# Patient Record
Sex: FEMALE | Race: ASIAN | Employment: OTHER | URBAN - METROPOLITAN AREA
[De-identification: names, ages, dates, MRNs, and addresses within clinical notes are randomized per-mention and may not be internally consistent; named-entity substitution may affect disease eponyms.]

---

## 2017-04-27 ENCOUNTER — OFFICE VISIT (OUTPATIENT)
Dept: PEDIATRICS | Facility: CLINIC | Age: 32
End: 2017-04-27
Payer: COMMERCIAL

## 2017-04-27 VITALS
OXYGEN SATURATION: 99 % | BODY MASS INDEX: 25.09 KG/M2 | DIASTOLIC BLOOD PRESSURE: 80 MMHG | WEIGHT: 141.6 LBS | TEMPERATURE: 98.6 F | HEIGHT: 63 IN | SYSTOLIC BLOOD PRESSURE: 116 MMHG | HEART RATE: 79 BPM

## 2017-04-27 DIAGNOSIS — Z13.1 SCREENING FOR DIABETES MELLITUS: ICD-10-CM

## 2017-04-27 DIAGNOSIS — Z13.6 CARDIOVASCULAR SCREENING; LDL GOAL LESS THAN 160: ICD-10-CM

## 2017-04-27 DIAGNOSIS — Z12.4 SCREENING FOR MALIGNANT NEOPLASM OF CERVIX: ICD-10-CM

## 2017-04-27 DIAGNOSIS — R22.1 LOCALIZED SWELLING, MASS AND LUMP, NECK: ICD-10-CM

## 2017-04-27 DIAGNOSIS — Z00.00 ENCOUNTER FOR ROUTINE ADULT HEALTH EXAMINATION WITHOUT ABNORMAL FINDINGS: Primary | ICD-10-CM

## 2017-04-27 PROCEDURE — 87624 HPV HI-RISK TYP POOLED RSLT: CPT | Performed by: NURSE PRACTITIONER

## 2017-04-27 PROCEDURE — G0145 SCR C/V CYTO,THINLAYER,RESCR: HCPCS | Performed by: NURSE PRACTITIONER

## 2017-04-27 PROCEDURE — 99385 PREV VISIT NEW AGE 18-39: CPT | Performed by: NURSE PRACTITIONER

## 2017-04-27 NOTE — MR AVS SNAPSHOT
After Visit Summary   4/27/2017    Lou Rivers    MRN: [id number]           Patient Information     Date Of Birth          1985        Visit Information        Provider Department      4/27/2017 4:20 PM Joselin Little APRN CNP [facility]        Today's Diagnoses     Encounter for routine adult health examination without abnormal findings    -  1    CARDIOVASCULAR SCREENING; LDL GOAL LESS THAN 160        Screening for diabetes mellitus        Screening for malignant neoplasm of cervix        Localized swelling, mass and lump, neck on left patient feels           Care Instructions    PLAN:   1.  Orders Placed This Encounter   Procedures     US Thyroid     HPV High Risk Types DNA Cervical     Pap imaged thin layer screen with HPV - recommended age 30 - 65 years (select HPV order below)     **Lipid panel reflex to direct LDL FUTURE anytime     **TSH with free T4 reflex FUTURE anytime     **Vitamin D Deficiency FUTURE anytime     **Basic metabolic panel FUTURE anytime       2. Patient needs to follow up in if no improvement,or sooner if worsening of symptoms or other symptoms develop.  FURTHER TESTING:       - Thyroid ultrasound  I will place order. Please call [phone] to schedule.  FUTURE LABS:       - Schedule a fasting blood draw   Will follow up and/or notify patient on results via phone or mail to determine further need for followup  Follow up office visit in one year for annual health maintenance exam, sooner PRN.    Preventive Health Recommendations  Female Ages 26 - 39  Yearly exam:   See your health care provider every year in order to    Review health changes.     Discuss preventive care.      Review your medicines if you your doctor has prescribed any.    Until age 30: Get a Pap test every three years (more often if you have had an abnormal result).    After age 30: Talk to your doctor about whether you should have a Pap test every 3 years or have a Pap test  with HPV screening every 5 years.   You do not need a Pap test if your uterus was removed (hysterectomy) and you have not had cancer.  You should be tested each year for STDs (sexually transmitted diseases), if you're at risk.   Talk to your provider about how often to have your cholesterol checked.  If you are at risk for diabetes, you should have a diabetes test (fasting glucose).  Shots: Get a flu shot each year. Get a tetanus shot every 10 years.   Nutrition:     Eat at least 5 servings of fruits and vegetables each day.    Eat whole-grain bread, whole-wheat pasta and brown rice instead of white grains and rice.    Talk to your provider about Calcium and Vitamin D.     Lifestyle    Exercise at least 150 minutes a week (30 minutes a day, 5 days of the week). This will help you control your weight and prevent disease.    Limit alcohol to one drink per day.    No smoking.     Wear sunscreen to prevent skin cancer.    See your dentist every six months for an exam and cleaning.    It was a pleasure seeing you today at the [facility]. Thank you for allowing us to care for you today. We truly hope we provided you with the excellent service you deserve. Please let us know if there is anything else we can do for you so we can be sure you are leaving completley satisfied with your care experience.       General information about your clinic   Clinic Hours Lab Hours (Appointments are required)   Mon-Thurs: 7:30 AM - 7 PM Mon-Thurs: 7:30 AM - 7 PM   Fri: 7:30 AM - 5 PM Fri: 7:30 AM - 5 PM        After Hours Nurse Advise & Appts:  [facility] Nurse Advisors: [phone]  Donte On Call: to make appointments anytime: [phone] On Call Physician: call [phone] and answering service will page the on call physician.        For urgent appointments, please call [phone] and ask for the triage nurse or your care team clinic nurse.  How to contact my care team:  Cesar:  [email or web address]   Phone: [phone]   Fax: [phone]       [facility]:   Phone: [phone]  Hours: 8:00 AM - 6:00 PM  Medication Refills:  Call your pharmacy and they will forward the refill to us. Please allow 3 business days for your refills to be completed.       Normal or non-critical lab and imaging results will be communicated to you by MyChart, letter or phone within 7 days.  If you do not hear from us within 10 days, please call the clinic. If you have a critical or abnormal lab result, we will notify you by phone as soon as possible.       We now have PWIC (Pediatric Walk in Care)  Monday-Friday from 7:30-4. Simply walk in and be seen for your urgent needs like cough, fever, rash, diarrhea or vomiting, pink eye, UTI. No appointments needed. Ask one of the team for more information      -Your Care Team:    Dr. Saud Elizabeth - Internal Medicine/Pediatrics   Dr. Jose Booth - Family Medicine  Dr. Rosa Aguilar - Pediatrics  Joselin Little CNP - Family Practice Nurse Practitioner  Dr. Tiffany Hobbs - Pediatrics  Dr. Irvin Yan - Internal Medicine                    Follow-ups after your visit        Future tests that were ordered for you today     Open Future Orders        Priority Expected Expires Ordered    **Lipid panel reflex to direct LDL FUTURE anytime Routine 4/27/2017 4/27/2018 4/27/2017    **TSH with free T4 reflex FUTURE anytime Routine 4/27/2017 4/27/2018 4/27/2017    **Vitamin D Deficiency FUTURE anytime Routine 4/27/2017 4/27/2018 4/27/2017    **Basic metabolic panel FUTURE anytime Routine 4/27/2017 4/27/2018 4/27/2017    US Thyroid Routine  4/27/2018 4/27/2017            Who to contact     If you have questions or need follow up information about today's clinic visit or your schedule please contact [facility] directly at [phone].  Normal or non-critical lab and imaging results will be communicated to you by MyChart, letter or phone within 4 business  "days after the clinic has received the results. If you do not hear from us within 7 days, please contact the clinic through [employer] or phone. If you have a critical or abnormal lab result, we will notify you by phone as soon as possible.  Submit refill requests through [employer] or call your pharmacy and they will forward the refill request to us. Please allow 3 business days for your refill to be completed.          Additional Information About Your Visit        [employer] Information     [employer] is an electronic gateway that provides easy, online access to your medical records. With [employer], you can request a clinic appointment, read your test results, renew a prescription or communicate with your care team.     To sign up for [employer] visit the website at [email or web address]   You will be asked to enter the access code listed below, as well as some personal information. Please follow the directions to create your username and password.     Your access code is: [email or web address]  Expires: 2017  5:12 PM     Your access code will  in 90 days. If you need help or a new code, please contact your [facility] or call [phone] for assistance.        Care EveryWhere ID     This is your Care EveryWhere ID. This could be used by other organizations to access your [place] medical records  [id number]        Your Vitals Were     Pulse Temperature Height Last Period Pulse Oximetry Breastfeeding?    79 98.6  F (37  C) (Temporal) 5' 3.25\" (1.607 m) 04/10/2017 99% No    BMI (Body Mass Index)                   24.89 kg/m2            Blood Pressure from Last 3 Encounters:   17 116/80    Weight from Last 3 Encounters:   17 141 lb 9.6 oz (64.2 kg)              We Performed the Following     HPV High Risk Types DNA Cervical     Pap imaged thin layer screen with HPV - recommended age 30 - 65 years (select HPV order below)        Primary Care Provider    [place] " [facility]       No address on file        Thank you!     Thank you for choosing [facility]  for your care. Our goal is always to provide you with excellent care. Hearing back from our patients is one way we can continue to improve our services. Please take a few minutes to complete the written survey that you may receive in the mail after your visit with us. Thank you!             Your Updated Medication List - Protect others around you: Learn how to safely use, store and throw away your medicines at www.disposemymeds.org.      Notice  As of 4/27/2017  5:12 PM    You have not been prescribed any medications.

## 2017-04-27 NOTE — NURSING NOTE
"Chief Complaint   Patient presents with     Physical     [place]       Initial /80 (BP Location: Right arm, Patient Position: Chair, Cuff Size: Adult Regular)  Pulse 79  Temp 98.6  F (37  C) (Temporal)  Ht 5' 3.25\" (1.607 m)  Wt 141 lb 9.6 oz (64.2 kg)  LMP 04/10/2017  SpO2 99%  Breastfeeding? No  BMI 24.89 kg/m2 Estimated body mass index is 24.89 kg/(m^2) as calculated from the following:    Height as of this encounter: 5' 3.25\" (1.607 m).    Weight as of this encounter: 141 lb 9.6 oz (64.2 kg).  Medication Reconciliation: complete      JEFFREY Lainez      "

## 2017-04-27 NOTE — PATIENT INSTRUCTIONS
PLAN:   1.  Orders Placed This Encounter   Procedures     US Thyroid     HPV High Risk Types DNA Cervical     Pap imaged thin layer screen with HPV - recommended age 30 - 65 years (select HPV order below)     **Lipid panel reflex to direct LDL FUTURE anytime     **TSH with free T4 reflex FUTURE anytime     **Vitamin D Deficiency FUTURE anytime     **Basic metabolic panel FUTURE anytime       2. Patient needs to follow up in if no improvement,or sooner if worsening of symptoms or other symptoms develop.  FURTHER TESTING:       - Thyroid ultrasound  I will place order. Please call [phone] to schedule.  FUTURE LABS:       - Schedule a fasting blood draw   Will follow up and/or notify patient on results via phone or mail to determine further need for followup  Follow up office visit in one year for annual health maintenance exam, sooner PRN.    Preventive Health Recommendations  Female Ages 26 - 39  Yearly exam:   See your health care provider every year in order to    Review health changes.     Discuss preventive care.      Review your medicines if you your doctor has prescribed any.    Until age 30: Get a Pap test every three years (more often if you have had an abnormal result).    After age 30: Talk to your doctor about whether you should have a Pap test every 3 years or have a Pap test with HPV screening every 5 years.   You do not need a Pap test if your uterus was removed (hysterectomy) and you have not had cancer.  You should be tested each year for STDs (sexually transmitted diseases), if you're at risk.   Talk to your provider about how often to have your cholesterol checked.  If you are at risk for diabetes, you should have a diabetes test (fasting glucose).  Shots: Get a flu shot each year. Get a tetanus shot every 10 years.   Nutrition:     Eat at least 5 servings of fruits and vegetables each day.    Eat whole-grain bread, whole-wheat pasta and brown rice instead of white grains and rice.    Talk to  your provider about Calcium and Vitamin D.     Lifestyle    Exercise at least 150 minutes a week (30 minutes a day, 5 days of the week). This will help you control your weight and prevent disease.    Limit alcohol to one drink per day.    No smoking.     Wear sunscreen to prevent skin cancer.    See your dentist every six months for an exam and cleaning.    It was a pleasure seeing you today at the [facility]. Thank you for allowing us to care for you today. We truly hope we provided you with the excellent service you deserve. Please let us know if there is anything else we can do for you so we can be sure you are leaving completley satisfied with your care experience.       General information about your clinic   Clinic Hours Lab Hours (Appointments are required)   Mon-Thurs: 7:30 AM - 7 PM Mon-Thurs: 7:30 AM - 7 PM   Fri: 7:30 AM - 5 PM Fri: 7:30 AM - 5 PM        After Hours Nurse Advise & Appts:  [facility] Nurse Advisors: [phone]  Donte On Call: to make appointments anytime: [phone] On Call Physician: call [phone] and answering service will page the on call physician.        For urgent appointments, please call [phone] and ask for the triage nurse or your care team clinic nurse.  How to contact my care team:  Cesar: [email or web address]   Phone: [phone]   Fax: [phone]       [facility]:   Phone: [phone]  Hours: 8:00 AM - 6:00 PM  Medication Refills:  Call your pharmacy and they will forward the refill to us. Please allow 3 business days for your refills to be completed.       Normal or non-critical lab and imaging results will be communicated to you by MyChart, letter or phone within 7 days.  If you do not hear from us within 10 days, please call the clinic. If you have a critical or abnormal lab result, we will notify you by phone as soon as possible.       We now have PWIC (Pediatric Walk in Care)  Monday-Friday from 7:30-4. Simply  walk in and be seen for your urgent needs like cough, fever, rash, diarrhea or vomiting, pink eye, UTI. No appointments needed. Ask one of the team for more information      -Your Care Team:    Dr. Saud Elizabeth - Internal Medicine/Pediatrics   Dr. Jose Booth - Family Medicine  Dr. Rosa Aguilar - Pediatrics  Joselin Little CNP - Family Practice Nurse Practitioner  Dr. Tiffany Hobbs - Pediatrics  Dr. Irvin Yan - Internal Medicine

## 2017-04-27 NOTE — PROGRESS NOTES
SUBJECTIVE:     CC: Lou Rivers is an 31 year old woman who presents for preventive health visit.   This patient is accompanied in the office by her  who is interpreting for her.    Healthy Habits:    Do you get at least three servings of calcium containing foods daily (dairy, green leafy vegetables, etc.)? no, taking calcium and/or vitamin D supplement: no    Amount of exercise or daily activities, outside of work: 3 day(s) per week    Problems taking medications regularly not applicable    Medication side effects: No    Have you had an eye exam in the past two years? yes    Do you see a dentist twice per year? no    Do you have sleep apnea, excessive snoring or daytime drowsiness?no      Today's PHQ-2 Score:   PHQ-2 ( 1999 Pfizer) 4/27/2017   Q1: Little interest or pleasure in doing things 1   Q2: Feeling down, depressed or hopeless 1   PHQ-2 Score 2       Abuse: Current or Past(Physical, Sexual or Emotional)- No  Do you feel safe in your environment - Yes    Social History   Substance Use Topics     Smoking status: Never Smoker     Smokeless tobacco: Never Used     Alcohol use No     The patient does not drink >3 drinks per day nor >7 drinks per week.    No results for input(s): CHOL, HDL, LDL, TRIG, CHOLHDLRATIO, NHDL in the last 17459 hours.    Reviewed orders with patient.  Reviewed health maintenance and updated orders accordingly - Yes    Mammo Decision Support:  Mammogram not appropriate for this patient based on age.    Pertinent mammograms are reviewed under the imaging tab.  History of abnormal Pap smear: NO - age 30- 65 PAP every 3 years recommended    Reviewed and updated as needed this visit by clinical staff  Tobacco  Allergies  Meds  Med Hx  Surg Hx  Fam Hx  Soc Hx        Reviewed and updated as needed this visit by Provider        Past Medical History:   Diagnosis Date     Allergic rhinitis      History of anxiety disorder 2012     Latent tuberculosis 2003      Past Surgical  History:   Procedure Laterality Date     LASIK BILATERAL Bilateral      SINUS SURGERY         ROS:  CONSTITUTIONAL:NEGATIVE for fever, chills, change in weight  INTEGUMENTARY/SKIN: NEGATIVE for worrisome rashes, moles or lesions  EYES: NEGATIVE for vision changes or irritation  ENT: NEGATIVE for ear, mouth and throat problems. Feels like something in her left side of her neck   RESP:NEGATIVE for significant cough or SOB  BREAST: NEGATIVE for masses, tenderness or discharge  CV: NEGATIVE for chest pain, palpitations or peripheral edema  GI: NEGATIVE for nausea, abdominal pain, heartburn, or change in bowel habits   female: normal menses, no unusual urinary symptoms and no unusual vaginal symptoms  MUSCULOSKELETAL:NEGATIVE for significant arthralgias or myalgia  NEURO: NEGATIVE for weakness, dizziness or paresthesias  ENDOCRINE: NEGATIVE for temperature intolerance, skin/hair changes  HEME/ALLERGY/IMMUNE: NEGATIVE for bleeding problems  PSYCHIATRIC: POSITIVE forHx anxiety and NEGATIVE foranxiety and depressed mood       Patient Active Problem List   Diagnosis     Allergic rhinitis     Past Surgical History:   Procedure Laterality Date     LASIK BILATERAL Bilateral      SINUS SURGERY         Social History   Substance Use Topics     Smoking status: Never Smoker     Smokeless tobacco: Never Used     Alcohol use No     Family History   Problem Relation Age of Onset     Coronary Artery Disease Mother      Hypertension Mother      Other Cancer Mother      throat cancer     Other - See Comments Mother      neck problems     Thyroid Disease Mother      Coronary Artery Disease Father      Hypertension Father      Hyperlipidemia Father      Other - See Comments Father      herniated disc in lumbar     Gout Father      Liver Cancer Maternal Grandmother      Myocardial Infarction Maternal Grandfather      DIABETES No family hx of      CEREBROVASCULAR DISEASE No family hx of      Breast Cancer No family hx of      Colon Cancer  "No family hx of      Prostate Cancer No family hx of      Depression No family hx of      Anxiety Disorder No family hx of      MENTAL ILLNESS No family hx of      Substance Abuse No family hx of      Anesthesia Reaction No family hx of      Asthma No family hx of      OSTEOPOROSIS No family hx of      Genetic Disorder No family hx of      Obesity No family hx of      Unknown/Adopted No family hx of          No current outpatient prescriptions on file.     No Known Allergies  OBJECTIVE:     /80 (BP Location: Right arm, Patient Position: Chair, Cuff Size: Adult Regular)  Pulse 79  Temp 98.6  F (37  C) (Temporal)  Ht 5' 3.25\" (1.607 m)  Wt 141 lb 9.6 oz (64.2 kg)  LMP 04/10/2017  SpO2 99%  Breastfeeding? No  BMI 24.89 kg/m2  EXAM:  GENERAL: healthy, alert and no distress  EYES: Eyes grossly normal to inspection, PERRL and conjunctivae and sclerae normal  HENT: ear canals and TM's normal, nose and mouth without ulcers or lesions  NECK: no adenopathy, no asymmetry, masses, or scars and thyroid normal to palpation  RESP: lungs clear to auscultation - no rales, rhonchi or wheezes  BREAST: normal without masses, tenderness or nipple discharge and no palpable axillary masses or adenopathy  CV: regular rate and rhythm, normal S1 S2, no S3 or S4, no murmur, click or rub, no peripheral edema and peripheral pulses strong  ABDOMEN: soft, nontender, no hepatosplenomegaly, no masses and bowel sounds normal   (female): normal female external genitalia, normal urethral meatus, vaginal mucosa pink, moist, well rugated, and normal cervix/adnexa/uterus without masses or discharge  MS: no gross musculoskeletal defects noted, no edema  SKIN: no suspicious lesions or rashes  NEURO: Normal strength and tone, mentation intact and speech normal  PSYCH: mentation appears normal, affect normal/bright  LYMPH: no cervical, supraclavicular, axillary, or inguinal adenopathy    .US THYROID 5/2/2017 5:13 PM     CLINICAL HISTORY: " Localized swelling, mass and lump, neck     COMPARISON: None     FINDINGS: The right thyroid measures 4.1 x 1.4 x 2.0 cm. The left  thyroid measures 4.4 x 1.2 x 1.6 cm. Thyroid isthmus measures 0.2 cm.  No nodules greater than 5 mm are present. There are approximately 3. 5  mm nodules in the left lobe. Specifically, there is no abnormality in  the area where the patient felt a lump.         IMPRESSION: No abnormality area where patient felt a lump. 3 small  nodules in the left thyroid.     DARRIUS COLLIER MD  ASSESSMENT/PLAN:     Lou was seen today for physical.    Diagnoses and all orders for this visit:    Encounter for routine adult health examination without abnormal findings  -     **Vitamin D Deficiency FUTURE anytime; Future  -     Cancel: Vitamin D Deficiency; Future    CARDIOVASCULAR SCREENING; LDL GOAL LESS THAN 160  -     **Lipid panel reflex to direct LDL FUTURE anytime; Future    Screening for diabetes mellitus  -     **Basic metabolic panel FUTURE anytime; Future    Screening for malignant neoplasm of cervix  -     HPV High Risk Types DNA Cervical  -     Pap imaged thin layer screen with HPV - recommended age 30 - 65 years (select HPV order below)    Localized swelling, mass and lump, neck on left patient feels   -     US Thyroid; Future  -     **TSH with free T4 reflex FUTURE anytime; Future    PLAN:    Patient needs to follow up in if no improvement,or sooner if worsening of symptoms or other symptoms develop.  FURTHER TESTING:       - Thyroid ultrasound  I will place order. Please call [phone] to schedule.  FUTURE LABS:       - Schedule a fasting blood draw   Will follow up and/or notify patient on results via phone or mail to determine further need for followup  Follow up office visit in one year for annual health maintenance exam, sooner PRN.      COUNSELING:   Reviewed preventive health counseling, as reflected in patient instructions  Special attention given to:        Regular  "exercise       Healthy diet/nutrition       Contraception       Family planning         reports that she has never smoked. She has never used smokeless tobacco.    Estimated body mass index is 24.89 kg/(m^2) as calculated from the following:    Height as of this encounter: 5' 3.25\" (1.607 m).    Weight as of this encounter: 141 lb 9.6 oz (64.2 kg).       Counseling Resources:  ATP IV Guidelines  Pooled Cohorts Equation Calculator  Breast Cancer Risk Calculator  FRAX Risk Assessment  ICSI Preventive Guidelines  Dietary Guidelines for Americans, 2010  USDA's MyPlate  ASA Prophylaxis  Lung CA Screening    Joselin Little, LAINEY CNP  [facility]  "

## 2017-04-27 NOTE — LETTER
May 8, 2017    Lou Rivers  [address] [place] [address]    Dear Lou,  We are happy to inform you that your PAP smear result from 4/27/17 is normal.  We are now able to do a follow up test on PAP smears. The DNA test is for HPV (Human Papilloma Virus). Cervical cancer is closely linked with certain types of HPV. Your result showed no evidence of high risk HPV.  Therefore we recommend you return in 3 years for your next pap smear.  You will still need to return to the clinic every year for an annual exam and other preventive tests.  Please contact the clinic at [phone] with any questions.  Sincerely,    LAINEY Jang CNP/rlm

## 2017-05-01 LAB
COPATH REPORT: NORMAL
PAP: NORMAL

## 2017-05-02 ENCOUNTER — RADIANT APPOINTMENT (OUTPATIENT)
Dept: ULTRASOUND IMAGING | Facility: CLINIC | Age: 32
End: 2017-05-02
Attending: NURSE PRACTITIONER
Payer: COMMERCIAL

## 2017-05-02 DIAGNOSIS — R22.1 LOCALIZED SWELLING, MASS AND LUMP, NECK: ICD-10-CM

## 2017-05-02 LAB
FINAL DIAGNOSIS: NORMAL
HPV HR 12 DNA CVX QL NAA+PROBE: NEGATIVE
HPV16 DNA SPEC QL NAA+PROBE: NEGATIVE
HPV18 DNA SPEC QL NAA+PROBE: NEGATIVE
SPECIMEN DESCRIPTION: NORMAL

## 2017-05-02 PROCEDURE — 76536 US EXAM OF HEAD AND NECK: CPT | Performed by: RADIOLOGY

## 2017-05-03 ENCOUNTER — TELEPHONE (OUTPATIENT)
Dept: PEDIATRICS | Facility: CLINIC | Age: 32
End: 2017-05-03

## 2017-05-03 NOTE — TELEPHONE ENCOUNTER
Left message via [employer]  for patient to return call to clinic and ask to speak with RN.    Amber Gavin RN,   [facility]

## 2017-05-03 NOTE — TELEPHONE ENCOUNTER
US Thyroid   Status:  Final result   Visible to patient:  No (Not Released) Dx:  Localized swelling, mass and lump, ne... Order: [id number]       Notes Recorded by Joselin Little APRN CNP on 5/3/2017 at 12:40 PM  Please let patient know that ultrasound did not  anything in the area she felt a lump

## 2017-05-05 DIAGNOSIS — Z00.00 ENCOUNTER FOR ROUTINE ADULT HEALTH EXAMINATION WITHOUT ABNORMAL FINDINGS: ICD-10-CM

## 2017-05-05 DIAGNOSIS — Z13.6 CARDIOVASCULAR SCREENING; LDL GOAL LESS THAN 160: ICD-10-CM

## 2017-05-05 DIAGNOSIS — R22.1 LOCALIZED SWELLING, MASS AND LUMP, NECK: ICD-10-CM

## 2017-05-05 DIAGNOSIS — Z13.1 SCREENING FOR DIABETES MELLITUS: ICD-10-CM

## 2017-05-05 LAB
ANION GAP SERPL CALCULATED.3IONS-SCNC: 7 MMOL/L (ref 3–14)
BUN SERPL-MCNC: 9 MG/DL (ref 7–30)
CALCIUM SERPL-MCNC: 8.6 MG/DL (ref 8.5–10.1)
CHLORIDE SERPL-SCNC: 107 MMOL/L (ref 94–109)
CHOLEST SERPL-MCNC: 189 MG/DL
CO2 SERPL-SCNC: 25 MMOL/L (ref 20–32)
CREAT SERPL-MCNC: 0.54 MG/DL (ref 0.52–1.04)
DEPRECATED CALCIDIOL+CALCIFEROL SERPL-MC: 20 UG/L (ref 20–75)
GFR SERPL CREATININE-BSD FRML MDRD: NORMAL ML/MIN/1.7M2
GLUCOSE SERPL-MCNC: 98 MG/DL (ref 70–99)
HDLC SERPL-MCNC: 50 MG/DL
LDLC SERPL CALC-MCNC: 107 MG/DL
NONHDLC SERPL-MCNC: 139 MG/DL
POTASSIUM SERPL-SCNC: 4 MMOL/L (ref 3.4–5.3)
SODIUM SERPL-SCNC: 139 MMOL/L (ref 133–144)
TRIGL SERPL-MCNC: 160 MG/DL
TSH SERPL DL<=0.005 MIU/L-ACNC: 2.09 MU/L (ref 0.4–4)

## 2017-05-05 PROCEDURE — 36415 COLL VENOUS BLD VENIPUNCTURE: CPT | Performed by: NURSE PRACTITIONER

## 2017-05-05 PROCEDURE — 80061 LIPID PANEL: CPT | Performed by: NURSE PRACTITIONER

## 2017-05-05 PROCEDURE — 84443 ASSAY THYROID STIM HORMONE: CPT | Performed by: NURSE PRACTITIONER

## 2017-05-05 PROCEDURE — 82306 VITAMIN D 25 HYDROXY: CPT | Performed by: NURSE PRACTITIONER

## 2017-05-05 PROCEDURE — 80048 BASIC METABOLIC PNL TOTAL CA: CPT | Performed by: NURSE PRACTITIONER

## 2017-05-05 NOTE — TELEPHONE ENCOUNTER
Left message informing patient the US did not show anything in the area she felt a lump.  If you have further questions to please call clinic and ask to speak with nurse.    Amber Gavin RN,   [facility], [facility]

## 2017-07-24 ENCOUNTER — OFFICE VISIT (OUTPATIENT)
Dept: PEDIATRICS | Facility: CLINIC | Age: 32
End: 2017-07-24
Payer: COMMERCIAL

## 2017-07-24 VITALS
OXYGEN SATURATION: 96 % | HEIGHT: 63 IN | DIASTOLIC BLOOD PRESSURE: 76 MMHG | HEART RATE: 83 BPM | TEMPERATURE: 99.6 F | SYSTOLIC BLOOD PRESSURE: 112 MMHG | BODY MASS INDEX: 24.93 KG/M2 | WEIGHT: 140.7 LBS

## 2017-07-24 DIAGNOSIS — R19.7 DIARRHEA, UNSPECIFIED TYPE: ICD-10-CM

## 2017-07-24 DIAGNOSIS — R09.A2 GLOBUS SENSATION: Primary | ICD-10-CM

## 2017-07-24 PROCEDURE — 99214 OFFICE O/P EST MOD 30 MIN: CPT | Performed by: NURSE PRACTITIONER

## 2017-07-24 NOTE — PATIENT INSTRUCTIONS
PLAN:   1.   Symptomatic therapy suggested: start prilosec once a day.    2.  Orders Placed This Encounter   Medications     omeprazole (PRILOSEC) 20 MG CR capsule     Sig: Take 1 capsule (20 mg) by mouth daily     Dispense:  30 capsule     Refill:  1     Orders Placed This Encounter   Procedures     OTOLARYNGOLOGY REFERRAL       3. Patient needs to follow up in if no improvement,or sooner if worsening of symptoms or other symptoms develop.  FUTURE LABS:       - stool cultures   Will follow up and/or notify patient on results via phone or mail to determine further need for followup    It was a pleasure seeing you today at the [facility]. Thank you for allowing us to care for you today. We truly hope we provided you with the excellent service you deserve. Please let us know if there is anything else we can do for you so we can be sure you are leaving completley satisfied with your care experience.       General information about your clinic   Clinic Hours Lab Hours (Appointments are required)   Mon-Thurs: 7:30 AM - 7 PM Mon-Thurs: 7:30 AM - 7 PM   Fri: 7:30 AM - 5 PM Fri: 7:30 AM - 5 PM        After Hours Nurse Advise & Appts:  [facility] Nurse Advisors: [phone]  [facility] On Call: to make appointments anytime: [phone] On Call Physician: call [phone] and answering service will page the on call physician.        For urgent appointments, please call [phone] and ask for the triage nurse or your care team clinic nurse.  How to contact my care team:  [facility] [employer]: [email or web address]   Phone: [phone]   Fax: [phone]       [facility]:   Phone: [phone]  Hours: 8:00 AM - 6:00 PM  Medication Refills:  Call your pharmacy and they will forward the refill to us. Please allow 3 business days for your refills to be completed.       Normal or non-critical lab and imaging results will be communicated to you by MyChart, letter or phone within 7 days.  If you do not  hear from us within 10 days, please call the clinic. If you have a critical or abnormal lab result, we will notify you by phone as soon as possible.       We now have PWIC (Pediatric Walk in Care)  Monday-Friday from 7:30-4. Simply walk in and be seen for your urgent needs like cough, fever, rash, diarrhea or vomiting, pink eye, UTI. No appointments needed. Ask one of the team for more information      -Your Care Team:    Dr. Irvin Yan - Internal Medicine  Dr. Saud Elizabeth - Internal Medicine/Pediatrics   Dr. Jose Booth - Family Medicine  Dr. Rosa Aguilar - Pediatrics  Dr. Tiffany Hobbs - Pediatrics  Joselin Little CNP - Family Practice Nurse Practitioner

## 2017-07-24 NOTE — MR AVS SNAPSHOT
After Visit Summary   7/24/2017    Lou Rivers    MRN: [id number]           Patient Information     Date Of Birth          1985        Visit Information        Provider Department      7/24/2017 3:00 PM Joselin Little APRN CNP [facility]        Today's Diagnoses     Globus sensation    -  1    Diarrhea, unspecified type          Care Instructions    PLAN:   1.   Symptomatic therapy suggested: start prilosec once a day.    2.  Orders Placed This Encounter   Medications     omeprazole (PRILOSEC) 20 MG CR capsule     Sig: Take 1 capsule (20 mg) by mouth daily     Dispense:  30 capsule     Refill:  1     Orders Placed This Encounter   Procedures     OTOLARYNGOLOGY REFERRAL       3. Patient needs to follow up in if no improvement,or sooner if worsening of symptoms or other symptoms develop.  FUTURE LABS:       - stool cultures   Will follow up and/or notify patient on results via phone or mail to determine further need for followup    It was a pleasure seeing you today at the [facility]. Thank you for allowing us to care for you today. We truly hope we provided you with the excellent service you deserve. Please let us know if there is anything else we can do for you so we can be sure you are leaving completley satisfied with your care experience.       General information about your clinic   Clinic Hours Lab Hours (Appointments are required)   Mon-Thurs: 7:30 AM - 7 PM Mon-Thurs: 7:30 AM - 7 PM   Fri: 7:30 AM - 5 PM Fri: 7:30 AM - 5 PM        After Hours Nurse Advise & Appts:  [facility] Nurse Advisors: [phone]  [facility] On Call: to make appointments anytime: [phone] On Call Physician: call [phone] and answering service will page the on call physician.        For urgent appointments, please call [phone] and ask for the triage nurse or your care team clinic nurse.  How to contact my care team:  MyChart: [email or web address]    Phone: [phone]   Fax: [phone]       [facility]:   Phone: [phone]  Hours: 8:00 AM - 6:00 PM  Medication Refills:  Call your pharmacy and they will forward the refill to us. Please allow 3 business days for your refills to be completed.       Normal or non-critical lab and imaging results will be communicated to you by MyChart, letter or phone within 7 days.  If you do not hear from us within 10 days, please call the clinic. If you have a critical or abnormal lab result, we will notify you by phone as soon as possible.       We now have PWIC (Pediatric Walk in Care)  Monday-Friday from 7:30-4. Simply walk in and be seen for your urgent needs like cough, fever, rash, diarrhea or vomiting, pink eye, UTI. No appointments needed. Ask one of the team for more information      -Your Care Team:    Dr. Irvin Yan - Internal Medicine  Dr. Saud Elizabeth - Internal Medicine/Pediatrics   Dr. Jose Booth - Family Medicine  Dr. Rosa Aguilar - Pediatrics  Dr. Tiffany Hobbs - Pediatrics  Joselin Little CNP - Family Practice Nurse Practitioner                           Follow-ups after your visit        Additional Services     OTOLARYNGOLOGY REFERRAL       Your provider has referred you to: [facility]: [facility] [place] [phone]   [email or web address]    Please be aware that coverage of these services is subject to the terms and limitations of your health insurance plan.  Call member services at your health plan with any benefit or coverage questions.      Please bring the following with you to your appointment:    (1) Any X-Rays, CTs or MRIs which have been performed.  Contact the facility where they were done to arrange for  prior to your scheduled appointment.   (2) List of current medications  (3) This referral request   (4) Any documents/labs given to you for this referral                  Future tests that were ordered for  you today     Open Future Orders        Priority Expected Expires Ordered    Enteric Bacteria and Virus Panel by ANUPAM Stool Routine  2018    Ova and Parasite Exam Routine Routine  2018            Who to contact     If you have questions or need follow up information about today's clinic visit or your schedule please contact [facility] directly at [phone].  Normal or non-critical lab and imaging results will be communicated to you by [employer], letter or phone within 4 business days after the clinic has received the results. If you do not hear from us within 7 days, please contact the clinic through [employer] or phone. If you have a critical or abnormal lab result, we will notify you by phone as soon as possible.  Submit refill requests through [employer] or call your pharmacy and they will forward the refill request to us. Please allow 3 business days for your refill to be completed.          Additional Information About Your Visit        [employer] Information     [employer] is an electronic gateway that provides easy, online access to your medical records. With [employer], you can request a clinic appointment, read your test results, renew a prescription or communicate with your care team.     To sign up for [employer] visit the website at [email or web address]   You will be asked to enter the access code listed below, as well as some personal information. Please follow the directions to create your username and password.     Your access code is: [email or web address]  Expires: 2017  5:12 PM     Your access code will  in 90 days. If you need help or a new code, please contact your [facility] or call [phone] for assistance.        Care EveryWhere ID     This is your Care EveryWhere ID. This could be used by other organizations to access your [facility] medical records  [id number]        Your Vitals Were     Pulse Temperature Height Last  "Period Pulse Oximetry BMI (Body Mass Index)    83 99.6  F (37.6  C) (Temporal) 5' 3\" (1.6 m) 07/04/2017 96% 24.92 kg/m2       Blood Pressure from Last 3 Encounters:   07/24/17 112/76   04/27/17 116/80    Weight from Last 3 Encounters:   07/24/17 140 lb 11.2 oz (63.8 kg)   04/27/17 141 lb 9.6 oz (64.2 kg)              We Performed the Following     OTOLARYNGOLOGY REFERRAL          Today's Medication Changes          These changes are accurate as of: 7/24/17  3:36 PM.  If you have any questions, ask your nurse or doctor.               Start taking these medicines.        Dose/Directions    omeprazole 20 MG CR capsule   Commonly known as:  priLOSEC   Used for:  Globus sensation   Started by:  Joselin Little APRN CNP        Dose:  20 mg   Take 1 capsule (20 mg) by mouth daily   Quantity:  30 capsule   Refills:  1            Where to get your medicines      These medications were sent to [facility] [address], [address]  [address], [address], [facility] [address]     Phone:  [phone]     omeprazole 20 MG CR capsule                Primary Care Provider    [facility]       No address on file        Equal Access to Services     ALFONSO SHERMAN AH: Hadii aad ku hadasho Soomaali, waaxda luqadaha, qaybta kaalmada adeegyada, nicole farrar hayrojas weaver. So [facility] [phone].    ATENCIÓN: Si habla español, tiene a dahl disposición servicios gratuitos de asistencia lingüística. Llame al [phone].    We comply with applicable federal civil rights laws and [place] laws. We do not discriminate on the basis of race, color, national origin, age, disability sex, sexual orientation or gender identity.            Thank you!     Thank you for choosing [facility]  for your care. Our goal is always to provide you with excellent care. Hearing back from our patients is one way we can continue to improve our services. " Please take a few minutes to complete the written survey that you may receive in the mail after your visit with us. Thank you!             Your Updated Medication List - Protect others around you: Learn how to safely use, store and throw away your medicines at www.disposemymeds.org.          This list is accurate as of: 7/24/17  3:36 PM.  Always use your most recent med list.                   Brand Name Dispense Instructions for use Diagnosis    omeprazole 20 MG CR capsule    priLOSEC    30 capsule    Take 1 capsule (20 mg) by mouth daily    Globus sensation

## 2017-07-24 NOTE — NURSING NOTE
"Chief Complaint   Patient presents with     RECHECK     Lump in throat     Constipation     Blood in stool       Initial /76  Pulse 83  Temp 99.6  F (37.6  C) (Temporal)  Ht 5' 3\" (1.6 m)  Wt 140 lb 11.2 oz (63.8 kg)  LMP 07/04/2017  SpO2 96%  BMI 24.92 kg/m2 Estimated body mass index is 24.92 kg/(m^2) as calculated from the following:    Height as of this encounter: 5' 3\" (1.6 m).    Weight as of this encounter: 140 lb 11.2 oz (63.8 kg).  Medication Reconciliation: complete     Gissell Choudhary CMA  "

## 2017-07-24 NOTE — PROGRESS NOTES
SUBJECTIVE:                                                    Lou Rivers is a 32 year old female who presents to clinic today for the following health issues:    Follow up on lump in throat from 04/27/17 visit.  States it feels  very scratchy and dry at night and in the morning.  Notices it more at night when she is trying to sleep. Feels better after she drinks water.  Feels it mostly at night and early in the morning   No complaint so heartburn     Diarrhea  Duration of complaint: 2 months off/on.  Patient has had blood and mucous in her stools.    Description:       Consistency of stool: loose, mucousy and red sometimes        Blood in stool: YES       Number of loose stools past 24 hours: 1       Fever: no       Nausea/vomitting: no       Abdominal pain: no       Weight loss: no       Recent antibiotics: no       Recent travel: Went to [place] for about a month and came back about a week ago       Any contacts ill: no  Therapies tried and outcome: nothing       Problem list and histories reviewed & adjusted, as indicated.  Additional history: as documented    Patient Active Problem List   Diagnosis     Allergic rhinitis     Past Surgical History:   Procedure Laterality Date     LASIK BILATERAL Bilateral      SINUS SURGERY         Social History   Substance Use Topics     Smoking status: Never Smoker     Smokeless tobacco: Never Used     Alcohol use No     Family History   Problem Relation Age of Onset     Coronary Artery Disease Mother      Hypertension Mother      Other Cancer Mother      throat cancer     Other - See Comments Mother      neck problems     Thyroid Disease Mother      Coronary Artery Disease Father      Hypertension Father      Hyperlipidemia Father      Other - See Comments Father      herniated disc in lumbar     Gout Father      Liver Cancer Maternal Grandmother      Myocardial Infarction Maternal Grandfather      DIABETES No family hx of      CEREBROVASCULAR DISEASE No family hx of       "Breast Cancer No family hx of      Colon Cancer No family hx of      Prostate Cancer No family hx of      Depression No family hx of      Anxiety Disorder No family hx of      MENTAL ILLNESS No family hx of      Substance Abuse No family hx of      Anesthesia Reaction No family hx of      Asthma No family hx of      OSTEOPOROSIS No family hx of      Genetic Disorder No family hx of      Obesity No family hx of      Unknown/Adopted No family hx of          No current outpatient prescriptions on file.     No Known Allergies      Reviewed and updated as needed this visit by clinical staff     Reviewed and updated as needed this visit by Provider         ROS:  CONSTITUTIONAL:NEGATIVE for fever, chills, change in weight  ENT/MOUTH: NEGATIVE for ear, mouth and throat problems  RESP:NEGATIVE for significant cough or SOB  CV: NEGATIVE for chest pain/chest pressure  GI: POSITIVE for diarrhea and hematochezia and NEGATIVE for abdominal pain , nausea, poor appetite, vomiting and weight loss  MUSCULOSKELETAL: NEGATIVE for significant arthralgias or myalgia  NEURO: NEGATIVE for weakness, dizziness or paresthesias  ENDOCRINE: NEGATIVE for temperature intolerance, skin/hair changes    OBJECTIVE:     /76  Pulse 83  Temp 99.6  F (37.6  C) (Temporal)  Ht 5' 3\" (1.6 m)  Wt 140 lb 11.2 oz (63.8 kg)  LMP 07/04/2017  SpO2 96%  BMI 24.92 kg/m2  Body mass index is 24.92 kg/(m^2).  GENERAL APPEARANCE: alert, active and no distress  HENT: ear canals and TM's normal, nose and mouth without ulcers or lesions, oral mucous membranes moist and oropharynx clear  NECK: no adenopathy, no asymmetry, masses, or scars and thyroid normal to palpation  RESP: lungs clear to auscultation - no rales, rhonchi or wheezes  CV: regular rates and rhythm and no murmur, click or rub  MS: extremities normal- no gross deformities noted  NEURO: Normal strength and tone, mentation intact and speech normal  PSYCH: mentation appears normal and affect " normal/bright    Diagnostic Test Results:  Pending orders       ASSESSMENT/PLAN:       Lou was seen today for recheck and constipation.    Diagnoses and all orders for this visit:    Globus sensation  -     OTOLARYNGOLOGY REFERRAL  -     omeprazole (PRILOSEC) 20 MG CR capsule; Take 1 capsule (20 mg) by mouth daily    Diarrhea, unspecified type  -     Enteric Bacteria and Virus Panel by ANUPAM Stool; Future  -     Ova and Parasite Exam Routine; Future    PLAN:   1.   Symptomatic therapy suggested: start prilosec once a day.    2. Patient needs to follow up in if no improvement,or sooner if worsening of symptoms or other symptoms develop.  FUTURE LABS:       - stool cultures   Will follow up and/or notify patient on results via phone or mail to determine further need for followup    See Patient Instructions    LAINEY Jang CNP  [facility]

## 2017-08-02 ENCOUNTER — OFFICE VISIT (OUTPATIENT)
Dept: OTOLARYNGOLOGY | Facility: CLINIC | Age: 32
End: 2017-08-02
Attending: NURSE PRACTITIONER
Payer: COMMERCIAL

## 2017-08-02 DIAGNOSIS — R19.7 DIARRHEA, UNSPECIFIED TYPE: ICD-10-CM

## 2017-08-02 DIAGNOSIS — J30.1 CHRONIC ALLERGIC RHINITIS DUE TO POLLEN, UNSPECIFIED SEASONALITY: Primary | ICD-10-CM

## 2017-08-02 LAB
CAMPYLOBACTER GROUP BY NAT: NOT DETECTED
ENTERIC PATHOGEN COMMENT: NORMAL
NOROVIRUS I AND II BY NAT: NOT DETECTED
ROTAVIRUS A BY NAT: NOT DETECTED
SALMONELLA SPECIES BY NAT: NOT DETECTED
SHIGA TOXIN 1 GENE BY NAT: NOT DETECTED
SHIGA TOXIN 2 GENE BY NAT: NOT DETECTED
SHIGELLA SP+EIEC IPAH STL QL NAA+PROBE: NOT DETECTED
VIBRIO GROUP BY NAT: NOT DETECTED
YERSINIA ENTEROCOLITICA BY NAT: NOT DETECTED

## 2017-08-02 PROCEDURE — 99203 OFFICE O/P NEW LOW 30 MIN: CPT | Mod: 25 | Performed by: OTOLARYNGOLOGY

## 2017-08-02 PROCEDURE — 87209 SMEAR COMPLEX STAIN: CPT | Performed by: NURSE PRACTITIONER

## 2017-08-02 PROCEDURE — 87177 OVA AND PARASITES SMEARS: CPT | Performed by: NURSE PRACTITIONER

## 2017-08-02 PROCEDURE — 87506 IADNA-DNA/RNA PROBE TQ 6-11: CPT | Performed by: NURSE PRACTITIONER

## 2017-08-02 PROCEDURE — 31575 DIAGNOSTIC LARYNGOSCOPY: CPT | Performed by: OTOLARYNGOLOGY

## 2017-08-02 RX ORDER — FLUTICASONE PROPIONATE 50 MCG
1 SPRAY, SUSPENSION (ML) NASAL
Qty: 1 BOTTLE | Refills: 11 | Status: SHIPPED | OUTPATIENT
Start: 2017-08-02 | End: 2018-03-13

## 2017-08-02 ASSESSMENT — PAIN SCALES - GENERAL: PAINLEVEL: NO PAIN (0)

## 2017-08-02 NOTE — PROGRESS NOTES
HISTORY OF PRESENT ILLNESS: Lou Rivers is a 32 year old female with a history of Globus sensation for 6-8 months.  This started when she was living in [place]. While living in the she had been treated for allergies year. She moved to the [facility] in November 2016. She had bad allergies in December and in increased sore throat problem during that time. This improved later in the year but now has been persistent for the last several months. Her symptoms are intermittent. There may worse when her rhinitis is worse. Her symptoms are primarily left-sided both in terms of rhinitis and sore throat. She feels as  if there a strip of irritation along the left side of her throat. This is improved by drinking water or eating food. It is exacerbated by talking. She was started on anti-reflux medicine and this has not helped her so far.     PAST MEDICAL HISTORY:   Past Medical History:   Diagnosis Date     Allergic rhinitis      History of anxiety disorder 2012     Latent tuberculosis 2003       PAST SURGICAL HISTORY:   Past Surgical History:   Procedure Laterality Date     LASIK BILATERAL Bilateral      SINUS SURGERY         FAMILY HISTORY:   Family History   Problem Relation Age of Onset     Coronary Artery Disease Mother      Hypertension Mother      Other Cancer Mother      throat cancer     Other - See Comments Mother      neck problems     Thyroid Disease Mother      Coronary Artery Disease Father      Hypertension Father      Hyperlipidemia Father      Other - See Comments Father      herniated disc in lumbar     Gout Father      Liver Cancer Maternal Grandmother      Myocardial Infarction Maternal Grandfather      DIABETES No family hx of      CEREBROVASCULAR DISEASE No family hx of      Breast Cancer No family hx of      Colon Cancer No family hx of      Prostate Cancer No family hx of      Depression No family hx of      Anxiety Disorder No family hx of      MENTAL ILLNESS No family hx of      Substance Abuse  No family hx of      Anesthesia Reaction No family hx of      Asthma No family hx of      OSTEOPOROSIS No family hx of      Genetic Disorder No family hx of      Obesity No family hx of      Unknown/Adopted No family hx of        SOCIAL HISTORY:   Social History   Substance Use Topics     Smoking status: Never Smoker     Smokeless tobacco: Never Used     Alcohol use No       REVIEW OF SYSTEMS: Ten point review of systems was performed and is negative except for what is noted in the HPI and PMH.     ALLERGIES: Review of patient's allergies indicates no known allergies.    MEDICATIONS:   Current Outpatient Prescriptions   Medication Sig Dispense Refill     fluticasone (FLONASE) 50 MCG/ACT spray Spray 1 spray into both nostrils 2 times daily 1 Bottle 11     omeprazole (PRILOSEC) 20 MG CR capsule Take 1 capsule (20 mg) by mouth daily 30 capsule 1       PHYSICAL EXAMINATION:  She  is awake, alert and in no apparent distress.    Her tympanic membranes are clear and intact bilaterally. External auditory canals are clear.  Nasal exam shows a left septal deviation without obstruction.  Examination of the oral cavity shows no suspicious lesions.  There is symmetric movement of the tongue and soft palate.    The oropharynx shows irritation on the left side of the throat. Palpation of this area with a Q-tip identifies this as here in the area where she has discomfort but feels that it is slightly lower throat..  Her neck is supple without significant adenopathy.  Pulse is regular.  Upper airway is clear.  Cranial nerves II-XII are grossly intact.       PROCEDURE: A flexible laryngoscopy  was performed.  Informed consent was obtained and a time out was performed. 3% lidocaine and 0.25% phenylephrine was sprayed into the nasal cavity and allowed 3 to 5 minutes for effect. The scope was passed through the both nostrils. There is a left septal deviation which is partially obstructing. There is moderate mucosal edema which  decongests well. No intranasal polyps or masses are seen. The right nasal cavity is more open. There is also nasal congestion and the ostiomeatal complex is clear no intranasal masses are seen. Laryngeal exam with the fiberoptic scope shows the vocal folds to be mobile and meet in the midline there are no nodules polyps or ulcerations seen. There is mild inflammation or erythema of the supraglottic or glottic larynx.  With phonation there is mildcontraction of the supraglottic larynx.  The hypopharynx is otherwise clear as is the subglottis.     IMPRESSION:Chronic rhinitis with a history of allergic rhinitis. This is associated with postnasal drip and pharyngeal irritation which is predominantly on the left side. This is likely related to a left septal deviation that is partially obstructing.    PLAN:I will start her with a saline nasal spray and a steroid nasal spray. She should use this over the next 6-8 weeks. If this does not resolve her symptoms consultation with an allergist may be appropriate given her history of allergies in [place]. I will have her follow up on a p.r.n. basis.

## 2017-08-02 NOTE — MR AVS SNAPSHOT
After Visit Summary   2017    Lou Rivers    MRN: [id number]           Patient Information     Date Of Birth          1985        Visit Information        Provider Department      2017 11:00 AM Haresh Lal MD [facility]        Today's Diagnoses     Chronic allergic rhinitis due to pollen, unspecified seasonality    -  1      Care Instructions    1) use saline nasal spray each nostril 2-4 times a day and before the Flonase nasal spary  2) consider an air filter for bedroom          Follow-ups after your visit        Who to contact     If you have questions or need follow up information about today's clinic visit or your schedule please contact [facility] directly at [phone].  Normal or non-critical lab and imaging results will be communicated to you by MyChart, letter or phone within 4 business days after the clinic has received the results. If you do not hear from us within 7 days, please contact the clinic through [employer] or phone. If you have a critical or abnormal lab result, we will notify you by phone as soon as possible.  Submit refill requests through [employer] or call your pharmacy and they will forward the refill request to us. Please allow 3 business days for your refill to be completed.          Additional Information About Your Visit        MyChart Information     [employer]-One is an electronic gateway that provides easy, online access to your medical records. With [employer], you can request a clinic appointment, read your test results, renew a prescription or communicate with your care team.     To sign up for [employer] visit the website at [email or web address]   You will be asked to enter the access code listed below, as well as some personal information. Please follow the directions to create your username and password.     Your access code is: [email or web address]  Expires: 10/31/2017 11:38 AM     Your access code will  in  90 days. If you need help or a new code, please contact your [facility] or call [phone] for assistance.        Care EveryWhere ID     This is your Care EveryWhere ID. This could be used by other organizations to access your [facility] medical records  [id number]        Your Vitals Were     Last Period                   07/04/2017            Blood Pressure from Last 3 Encounters:   07/24/17 112/76   04/27/17 116/80    Weight from Last 3 Encounters:   07/24/17 63.8 kg (140 lb 11.2 oz)   04/27/17 64.2 kg (141 lb 9.6 oz)              Today, you had the following     No orders found for display         Today's Medication Changes          These changes are accurate as of: 8/2/17 11:38 AM.  If you have any questions, ask your nurse or doctor.               Start taking these medicines.        Dose/Directions    fluticasone 50 MCG/ACT spray   Commonly known as:  FLONASE   Used for:  Chronic allergic rhinitis due to pollen, unspecified seasonality   Started by:  Haresh Lal MD        Dose:  1 spray   Spray 1 spray into both nostrils 2 times daily   Quantity:  1 Bottle   Refills:  11            Where to get your medicines      These medications were sent to [facility] - [place], [place] - [address], [address]  [address], [address], [facility] [address]     Phone:  [phone]     fluticasone 50 MCG/ACT spray                Primary Care Provider    [facility]       No address on file        Equal Access to Services     ALFONSO SHERMAN AH: Hadii aad ku hadasho Sochelseaali, waaxda luqadaha, qaybta kaalmada evita, nicole weaver. So [facility] [phone].    ATENCIÓN: Si habla español, tiene a dahl disposición servicios gratuitos de asistencia lingüística. Llame al [phone].    We comply with applicable federal civil rights laws and [place] laws. We do not discriminate on the basis of race,  color, national origin, age, disability sex, sexual orientation or gender identity.            Thank you!     Thank you for choosing [facility]  for your care. Our goal is always to provide you with excellent care. Hearing back from our patients is one way we can continue to improve our services. Please take a few minutes to complete the written survey that you may receive in the mail after your visit with us. Thank you!             Your Updated Medication List - Protect others around you: Learn how to safely use, store and throw away your medicines at www.disposemymeds.org.          This list is accurate as of: 8/2/17 11:38 AM.  Always use your most recent med list.                   Brand Name Dispense Instructions for use Diagnosis    fluticasone 50 MCG/ACT spray    FLONASE    1 Bottle    Spray 1 spray into both nostrils 2 times daily    Chronic allergic rhinitis due to pollen, unspecified seasonality       omeprazole 20 MG CR capsule    priLOSEC    30 capsule    Take 1 capsule (20 mg) by mouth daily    Globus sensation

## 2017-08-02 NOTE — PATIENT INSTRUCTIONS
1) use saline nasal spray each nostril 2-4 times a day and before the Flonase nasal spary  2) consider an air filter for bedroom

## 2017-08-02 NOTE — NURSING NOTE
"Lou Rivers's goals for this visit include:   Chief Complaint   Patient presents with     Throat Problem     left side throat discomfort       She requests these members of her care team be copied on today's visit information: [facility]      PCP: [facility]    Referring Provider:  LAINEY Jang CNP  [facility]  [address]  [place] [address]    Chief Complaint   Patient presents with     Throat Problem     left side throat discomfort       Initial LMP 07/04/2017 Estimated body mass index is 24.92 kg/(m^2) as calculated from the following:    Height as of 7/24/17: 1.6 m (5' 3\").    Weight as of 7/24/17: 63.8 kg (140 lb 11.2 oz).  Medication Reconciliation: complete    Do you need any medication refills at today's visit? No    Latisha Lowe RN    "

## 2017-08-03 ENCOUNTER — TELEPHONE (OUTPATIENT)
Dept: PEDIATRICS | Facility: CLINIC | Age: 32
End: 2017-08-03

## 2017-08-03 LAB
MICRO REPORT STATUS: NORMAL
O+P STL MICRO: NORMAL
SPECIMEN SOURCE: NORMAL

## 2017-08-03 NOTE — TELEPHONE ENCOUNTER
Gave patient results below.  She is not having loose stools any longer she is taking the medication that Radha gave her at the office visit.  She states she will call if her diarrhea returns and get colonoscopy order.    Amber Gavin RN,   [facility]

## 2017-08-03 NOTE — TELEPHONE ENCOUNTER
Notes Recorded by Joselin Little APRN CNP on 8/2/2017 at 10:24 PM  Stool culture is negative   Ova and parasites still pending      Notes Recorded by Joselin Little APRN CNP on 8/3/2017 at 3:22 PM  Stool negative for ova and parasites   If still having loose stools next step is colonoscopy   Please let me know and will make referral if needed

## 2017-08-05 ENCOUNTER — NURSE TRIAGE (OUTPATIENT)
Dept: NURSING | Facility: CLINIC | Age: 32
End: 2017-08-05

## 2017-08-05 NOTE — TELEPHONE ENCOUNTER
Regarding: diarrhea   ----- Message from Chetna Catherine sent at 8/5/2017  2:29 PM CDT -----  Reason for call:  Symptom   Symptom or request: diarrhea     Duration (how long have symptoms been present): couple of days   Have you been treated for this before? Yes, has labs done but were negative     Additional comments: still having diarrhea 3x today    Phone number to reach patient:  Other phone number:  [phone]    Best Time:  any    Can we leave a detailed message on this number?  YES

## 2017-08-05 NOTE — TELEPHONE ENCOUNTER
Reason for Disposition    Abdominal pain  (Exception: Pain clears with each passage of diarrhea stool)    Additional Information    Negative: Shock suspected (e.g., cold/pale/clammy skin, too weak to stand, low BP, rapid pulse)    Negative: Difficult to awaken or acting confused  (e.g., disoriented, slurred speech)    Negative: Sounds like a life-threatening emergency to the triager    Negative: Vomiting also present and worse than the diarrhea    Negative: [1] Blood in stool AND [2] without diarrhea    Negative: [1] SEVERE abdominal pain (e.g., excruciating) AND [2] present > 1 hour    Negative: [1] SEVERE abdominal pain AND [2] age > 60    Negative: [1] Blood in the stool AND [2] moderate or large amount of blood    Negative: Black or tarry bowel movements  (Exception: chronic-unchanged  black-grey bowel movements AND is taking iron pills or Pepto-bismol)    Negative: [1] Drinking very little AND [2] dehydration suspected (e.g., no urine > 12 hours, very dry mouth, very lightheaded)    Negative: Patient sounds very sick or weak to the triager    Negative: [1] SEVERE diarrhea (e.g., 7 or more times / day more than normal) AND [2]  age > 60 years    Negative: [1] Constant abdominal pain AND [2] present > 2 hours    Negative: [1] Fever > 103 F (39.4 C) AND [2] not able to get the fever down using Fever Care Advice    Negative: [1] SEVERE diarrhea (e.g., 7 or more times / day more than normal) AND [2] present > 24 hours (1 day)    Negative: [1] MODERATE diarrhea (e.g., 4-6 times / day more than normal) AND [2] present > 48 hours (2 days)    Negative: [1] MODERATE diarrhea (e.g., 4-6 times / day more than normal) AND [2] age > 70 years    Negative: Fever > 101 F (38.3 C)    Negative: Fever present > 3 days (72 hours)    Protocols used: DIARRHEA-ADULT-AH      I called and spoke with her , as she requested, to relay the information. We discussed options for urgent care or the ER tonight or tomorrow, by this time  of day. He understands and asked what they should tell the MD. Advised that they need to tell them about the abdominal pains which are new and different. They will need to evaluate her based on that new symptom.  He states he understands.  Zhanna Caal RN-[facility]

## 2017-09-01 ENCOUNTER — OFFICE VISIT (OUTPATIENT)
Dept: PEDIATRICS | Facility: CLINIC | Age: 32
End: 2017-09-01
Payer: COMMERCIAL

## 2017-09-01 VITALS
TEMPERATURE: 97.9 F | HEART RATE: 77 BPM | OXYGEN SATURATION: 96 % | SYSTOLIC BLOOD PRESSURE: 110 MMHG | BODY MASS INDEX: 24.84 KG/M2 | DIASTOLIC BLOOD PRESSURE: 70 MMHG | WEIGHT: 140.2 LBS

## 2017-09-01 DIAGNOSIS — R19.7 DIARRHEA, UNSPECIFIED TYPE: Primary | ICD-10-CM

## 2017-09-01 DIAGNOSIS — Z30.09 FAMILY PLANNING COUNSELING: ICD-10-CM

## 2017-09-01 DIAGNOSIS — F43.23 ADJUSTMENT DISORDER WITH MIXED ANXIETY AND DEPRESSED MOOD: ICD-10-CM

## 2017-09-01 LAB
ALBUMIN SERPL-MCNC: 3.7 G/DL (ref 3.4–5)
ALP SERPL-CCNC: 56 U/L (ref 40–150)
ALT SERPL W P-5'-P-CCNC: 28 U/L (ref 0–50)
ANION GAP SERPL CALCULATED.3IONS-SCNC: 8 MMOL/L (ref 3–14)
AST SERPL W P-5'-P-CCNC: 20 U/L (ref 0–45)
BASOPHILS # BLD AUTO: 0.1 10E9/L (ref 0–0.2)
BASOPHILS NFR BLD AUTO: 0.8 %
BILIRUB SERPL-MCNC: 0.4 MG/DL (ref 0.2–1.3)
BUN SERPL-MCNC: 7 MG/DL (ref 7–30)
CALCIUM SERPL-MCNC: 8.8 MG/DL (ref 8.5–10.1)
CHLORIDE SERPL-SCNC: 105 MMOL/L (ref 94–109)
CO2 SERPL-SCNC: 26 MMOL/L (ref 20–32)
CREAT SERPL-MCNC: 0.5 MG/DL (ref 0.52–1.04)
DIFFERENTIAL METHOD BLD: NORMAL
EOSINOPHIL # BLD AUTO: 0.2 10E9/L (ref 0–0.7)
EOSINOPHIL NFR BLD AUTO: 3 %
ERYTHROCYTE [DISTWIDTH] IN BLOOD BY AUTOMATED COUNT: 13 % (ref 10–15)
GFR SERPL CREATININE-BSD FRML MDRD: >90 ML/MIN/1.7M2
GLUCOSE SERPL-MCNC: 87 MG/DL (ref 70–99)
HCT VFR BLD AUTO: 42.2 % (ref 35–47)
HGB BLD-MCNC: 14.4 G/DL (ref 11.7–15.7)
LYMPHOCYTES # BLD AUTO: 2.9 10E9/L (ref 0.8–5.3)
LYMPHOCYTES NFR BLD AUTO: 36.2 %
MCH RBC QN AUTO: 30.7 PG (ref 26.5–33)
MCHC RBC AUTO-ENTMCNC: 34.1 G/DL (ref 31.5–36.5)
MCV RBC AUTO: 90 FL (ref 78–100)
MONOCYTES # BLD AUTO: 0.5 10E9/L (ref 0–1.3)
MONOCYTES NFR BLD AUTO: 6.6 %
NEUTROPHILS # BLD AUTO: 4.3 10E9/L (ref 1.6–8.3)
NEUTROPHILS NFR BLD AUTO: 53.4 %
PLATELET # BLD AUTO: 240 10E9/L (ref 150–450)
POTASSIUM SERPL-SCNC: 4.1 MMOL/L (ref 3.4–5.3)
PROT SERPL-MCNC: 7.8 G/DL (ref 6.8–8.8)
RBC # BLD AUTO: 4.69 10E12/L (ref 3.8–5.2)
SODIUM SERPL-SCNC: 139 MMOL/L (ref 133–144)
TSH SERPL DL<=0.005 MIU/L-ACNC: 1.46 MU/L (ref 0.4–4)
WBC # BLD AUTO: 8 10E9/L (ref 4–11)

## 2017-09-01 PROCEDURE — 80050 GENERAL HEALTH PANEL: CPT | Performed by: NURSE PRACTITIONER

## 2017-09-01 PROCEDURE — 36415 COLL VENOUS BLD VENIPUNCTURE: CPT | Performed by: NURSE PRACTITIONER

## 2017-09-01 PROCEDURE — 99214 OFFICE O/P EST MOD 30 MIN: CPT | Performed by: NURSE PRACTITIONER

## 2017-09-01 RX ORDER — PRENATAL VIT/IRON FUM/FOLIC AC 27MG-0.8MG
1 TABLET ORAL DAILY
Qty: 100 TABLET | Refills: 3 | Status: SHIPPED | OUTPATIENT
Start: 2017-09-01 | End: 2018-06-29

## 2017-09-01 ASSESSMENT — PATIENT HEALTH QUESTIONNAIRE - PHQ9
5. POOR APPETITE OR OVEREATING: NOT AT ALL
SUM OF ALL RESPONSES TO PHQ QUESTIONS 1-9: 17

## 2017-09-01 ASSESSMENT — ANXIETY QUESTIONNAIRES
6. BECOMING EASILY ANNOYED OR IRRITABLE: NOT AT ALL
IF YOU CHECKED OFF ANY PROBLEMS ON THIS QUESTIONNAIRE, HOW DIFFICULT HAVE THESE PROBLEMS MADE IT FOR YOU TO DO YOUR WORK, TAKE CARE OF THINGS AT HOME, OR GET ALONG WITH OTHER PEOPLE: SOMEWHAT DIFFICULT
5. BEING SO RESTLESS THAT IT IS HARD TO SIT STILL: NOT AT ALL
1. FEELING NERVOUS, ANXIOUS, OR ON EDGE: NEARLY EVERY DAY
2. NOT BEING ABLE TO STOP OR CONTROL WORRYING: NEARLY EVERY DAY
7. FEELING AFRAID AS IF SOMETHING AWFUL MIGHT HAPPEN: MORE THAN HALF THE DAYS
3. WORRYING TOO MUCH ABOUT DIFFERENT THINGS: NEARLY EVERY DAY
GAD7 TOTAL SCORE: 11

## 2017-09-01 NOTE — PATIENT INSTRUCTIONS
PLAN:   1.  Orders Placed This Encounter   Medications     Prenatal Vit-Fe Fumarate-FA (PRENATAL MULTIVITAMIN PLUS IRON) 27-0.8 MG TABS per tablet     Sig: Take 1 tablet by mouth daily     Dispense:  100 tablet     Refill:  3     Orders Placed This Encounter   Procedures     Comprehensive metabolic panel     CBC with platelets differential     TSH with free T4 reflex     GASTROENTEROLOGY ADULT REF PROCEDURE ONLY       2. Patient needs to follow up in if no improvement,or sooner if worsening of symptoms or other symptoms develop.  CONSULTATION/REFERRAL to Gastroenterology  Please call [phone] to make appointment  if you do not hear from referrals in the next few days.   Consider meeting with Latisha for counseling     It was a pleasure seeing you today at the [facility]. Thank you for allowing us to care for you today. We truly hope we provided you with the excellent service you deserve. Please let us know if there is anything else we can do for you so we can be sure you are leaving completley satisfied with your care experience.       General information about your clinic   Clinic Hours Lab Hours (Appointments are required)   Mon-Thurs: 7:30 AM - 7 PM Mon-Thurs: 7:30 AM - 7 PM   Fri: 7:30 AM - 5 PM Fri: 7:30 AM - 5 PM        After Hours Nurse Advise & Appts:  [facility]: [phone]  [facility] On Call: to make appointments anytime: [phone] On Call Physician: call [phone] and answering service will page the on call physician.        For urgent appointments, please call [phone] and ask for the triage nurse or your care team clinic nurse.  How to contact my care team:  MyChart: [email or web address]   Phone: [phone]   Fax: [phone]       [facility]:   Phone: [phone]  Hours: 8:00 AM - 6:00 PM  Medication Refills:  Call your pharmacy and they will forward the refill to us. Please allow 3 business days for your refills to be completed.        Normal or non-critical lab and imaging results will be communicated to you by MyChart, letter or phone within 7 days.  If you do not hear from us within 10 days, please call the clinic. If you have a critical or abnormal lab result, we will notify you by phone as soon as possible.       We now have PWIC (Pediatric Walk in Care)  Monday-Friday from 7:30-4. Simply walk in and be seen for your urgent needs like cough, fever, rash, diarrhea or vomiting, pink eye, UTI. No appointments needed. Ask one of the team for more information      -Your Care Team:    Dr. Saud Elizabeth - Internal Medicine/Pediatrics   Dr. Jose Booth - Family Medicine  Dr. Rosa Aguilar - Pediatrics  Dr. Tiffany Hobbs - Pediatrics  Joselin Little CNP - Family Practice Nurse Practitioner

## 2017-09-01 NOTE — PROGRESS NOTES
SUBJECTIVE:   Lou Rivers is a 32 year old female who presents to clinic today for the following health issues:  Lou Rivers is accompanied today by  who helps translate for her      Diarrhea  Onset: on and off for the last 6 months, note getting better per patient.    Description:   Consistency of stool: watery, runny, yellow and green  Blood in stool: YES- last month  Number of loose stools in past 24 hours: 4    Progression of Symptoms:  waxing and waning    Accompanying Signs & Symptoms:  Fever: no   Nausea or vomiting; YES  Abdominal pain: YES- sometimes in the middle of the stomach, gurgling noises  Episodes of constipation: no   Weight loss: no     History:   Ill contacts: no   Recent use of antibiotics: no    Recent travels: no          Recent medication-new or changes(Rx or OTC): no     Precipitating factors:   none    Alleviating factors:   none    Therapies Tried and outcome:  none;   Was seen a few months ago and cultures all negative at that time did recommend colonoscopy.   She is back today with  for follow up and is continuing to have diarrhea  Yesterday was 4 times and today none yet  Has waken her up in the middle of the night    Also, she has additional complaints of :  PROBLEMS TO ADD ON...  Depression or Anxiety - New Diagnosis   Duration of complaint: having issue with anxiety and depression and states was treated 7 years ago while in [place] and now [place] been in [place] since December of last year and then has been in some culture shock.    Description of symptoms: has anxiety and depression and is thinking about getting pregnant so is hesitant go on any medication     Intensity:  moderate  Accompanying signs and symptoms: diarrhea, loose stools and mucus in stools  Aggravating factors: none  Alleviating factors: nothing  Other Therapies tried: None      Problem list and histories reviewed & adjusted, as indicated.  Additional history: as documented    Patient Active Problem  List   Diagnosis     Allergic rhinitis     Past Surgical History:   Procedure Laterality Date     LASIK BILATERAL Bilateral      SINUS SURGERY         Social History   Substance Use Topics     Smoking status: Never Smoker     Smokeless tobacco: Never Used     Alcohol use No     Family History   Problem Relation Age of Onset     Coronary Artery Disease Mother      Hypertension Mother      Other Cancer Mother      throat cancer     Other - See Comments Mother      neck problems     Thyroid Disease Mother      Coronary Artery Disease Father      Hypertension Father      Hyperlipidemia Father      Other - See Comments Father      herniated disc in lumbar     Gout Father      Liver Cancer Maternal Grandmother      Myocardial Infarction Maternal Grandfather      DIABETES No family hx of      CEREBROVASCULAR DISEASE No family hx of      Breast Cancer No family hx of      Colon Cancer No family hx of      Prostate Cancer No family hx of      Depression No family hx of      Anxiety Disorder No family hx of      MENTAL ILLNESS No family hx of      Substance Abuse No family hx of      Anesthesia Reaction No family hx of      Asthma No family hx of      OSTEOPOROSIS No family hx of      Genetic Disorder No family hx of      Obesity No family hx of      Unknown/Adopted No family hx of          Current Outpatient Prescriptions   Medication Sig Dispense Refill     fluticasone (FLONASE) 50 MCG/ACT spray Spray 1 spray into both nostrils 2 times daily 1 Bottle 11     omeprazole (PRILOSEC) 20 MG CR capsule Take 1 capsule (20 mg) by mouth daily 30 capsule 1     No Known Allergies      Reviewed and updated as needed this visit by clinical staffTobacco  Allergies  Meds  Med Hx  Surg Hx  Fam Hx  Soc Hx      Reviewed and updated as needed this visit by Provider         ROS:  Constitutional, HEENT, cardiovascular, pulmonary, gi and gu systems are negative, except as otherwise noted.      OBJECTIVE:   /70 (BP Location: Right arm,  Patient Position: Sitting, Cuff Size: Adult Regular)  Pulse 77  Temp 97.9  F (36.6  C) (Temporal)  Wt 140 lb 3.2 oz (63.6 kg)  LMP 08/21/2017  SpO2 96%  Breastfeeding? No  BMI 24.84 kg/m2  Body mass index is 24.84 kg/(m^2).   Wt Readings from Last 4 Encounters:   09/01/17 140 lb 3.2 oz (63.6 kg)   07/24/17 140 lb 11.2 oz (63.8 kg)   04/27/17 141 lb 9.6 oz (64.2 kg)       GENERAL APPEARANCE: alert, active and no distress  RESP: lungs clear to auscultation - no rales, rhonchi or wheezes  CV: regular rates and rhythm and no murmur, click or rub  ABDOMEN: soft, nontender, without hepatosplenomegaly or masses and bowel sounds normal  MS: extremities normal- no gross deformities noted  SKIN: no suspicious lesions or rashes  PSYCH: mentation appears normal, affect normal/bright and disoriented    Diagnostic Test Results:  Results for orders placed or performed in visit on 09/01/17   Comprehensive metabolic panel   Result Value Ref Range    Sodium 139 133 - 144 mmol/L    Potassium 4.1 3.4 - 5.3 mmol/L    Chloride 105 94 - 109 mmol/L    Carbon Dioxide 26 20 - 32 mmol/L    Anion Gap 8 3 - 14 mmol/L    Glucose 87 70 - 99 mg/dL    Urea Nitrogen 7 7 - 30 mg/dL    Creatinine 0.50 (L) 0.52 - 1.04 mg/dL    GFR Estimate >90 >60 mL/min/1.7m2    GFR Estimate If Black >90 >60 mL/min/1.7m2    Calcium 8.8 8.5 - 10.1 mg/dL    Bilirubin Total 0.4 0.2 - 1.3 mg/dL    Albumin 3.7 3.4 - 5.0 g/dL    Protein Total 7.8 6.8 - 8.8 g/dL    Alkaline Phosphatase 56 40 - 150 U/L    ALT 28 0 - 50 U/L    AST 20 0 - 45 U/L   CBC with platelets differential   Result Value Ref Range    WBC 8.0 4.0 - 11.0 10e9/L    RBC Count 4.69 3.8 - 5.2 10e12/L    Hemoglobin 14.4 11.7 - 15.7 g/dL    Hematocrit 42.2 35.0 - 47.0 %    MCV 90 78 - 100 fl    MCH 30.7 26.5 - 33.0 pg    MCHC 34.1 31.5 - 36.5 g/dL    RDW 13.0 10.0 - 15.0 %    Platelet Count 240 150 - 450 10e9/L    Diff Method Automated Method     % Neutrophils 53.4 %    % Lymphocytes 36.2 %    % Monocytes  6.6 %    % Eosinophils 3.0 %    % Basophils 0.8 %    Absolute Neutrophil 4.3 1.6 - 8.3 10e9/L    Absolute Lymphocytes 2.9 0.8 - 5.3 10e9/L    Absolute Monocytes 0.5 0.0 - 1.3 10e9/L    Absolute Eosinophils 0.2 0.0 - 0.7 10e9/L    Absolute Basophils 0.1 0.0 - 0.2 10e9/L   TSH with free T4 reflex   Result Value Ref Range    TSH 1.46 0.40 - 4.00 mU/L       ASSESSMENT/PLAN:       Lou was seen today for diarrhea.    Diagnoses and all orders for this visit:    Diarrhea, unspecified type  -     GASTROENTEROLOGY ADULT REF PROCEDURE ONLY  -     Comprehensive metabolic panel  -     CBC with platelets differential  -     TSH with free T4 reflex    Family planning counseling  -     Prenatal Vit-Fe Fumarate-FA (PRENATAL MULTIVITAMIN PLUS IRON) 27-0.8 MG TABS per tablet; Take 1 tablet by mouth daily    Adjustment disorder with mixed anxiety and depressed mood  Long discussion regarding the nature of depression including the internal vs external triggers.  Discussed risks, benefits, side effects, and alternatives of therapy.  Reviewed concept of depression as function of biochemical imbalance of neurotransmitters/rationale for treatment.  Risks and benefits of medication(s) reviewed with patient.  Questions answered.  Counseling advised  Refer to psychologist  Patient advised immediate presentation to hospital for suicidal thought, etc.      PLAN:    Patient needs to follow up in if no improvement,or sooner if worsening of symptoms or other symptoms develop.  CONSULTATION/REFERRAL to Gastroenterology  Please call [phone] to make appointment  if you do not hear from referrals in the next few days.   Consider meeting with Latisha for counseling    See Patient Instructions    LAINEY Jang [facility]

## 2017-09-01 NOTE — MR AVS SNAPSHOT
After Visit Summary   9/1/2017    Lou Rivers    MRN: [id number]           Patient Information     Date Of Birth          1985        Visit Information        Provider Department      9/1/2017 9:20 AM Joselin Little APRN [facility]        Today's Diagnoses     Diarrhea, unspecified type    -  1    Family planning counseling          Care Instructions    PLAN:   1.  Orders Placed This Encounter   Medications     Prenatal Vit-Fe Fumarate-FA (PRENATAL MULTIVITAMIN PLUS IRON) 27-0.8 MG TABS per tablet     Sig: Take 1 tablet by mouth daily     Dispense:  100 tablet     Refill:  3     Orders Placed This Encounter   Procedures     Comprehensive metabolic panel     CBC with platelets differential     TSH with free T4 reflex     GASTROENTEROLOGY ADULT REF PROCEDURE ONLY       2. Patient needs to follow up in if no improvement,or sooner if worsening of symptoms or other symptoms develop.  CONSULTATION/REFERRAL to Gastroenterology  Please call [phone] to make appointment  if you do not hear from referrals in the next few days.   Consider meeting with Latisha for counseling     It was a pleasure seeing you today at the [facility]. Thank you for allowing us to care for you today. We truly hope we provided you with the excellent service you deserve. Please let us know if there is anything else we can do for you so we can be sure you are leaving completley satisfied with your care experience.       General information about your clinic   Clinic Hours Lab Hours (Appointments are required)   Mon-Thurs: 7:30 AM - 7 PM Mon-Thurs: 7:30 AM - 7 PM   Fri: 7:30 AM - 5 PM Fri: 7:30 AM - 5 PM        After Hours Nurse Advise & Appts:  [facility] Nurse Advisors: [phone]  [facility] On Call: to make appointments anytime: [phone] On Call Physician: call [phone] and answering service will page the on call physician.        For urgent appointments,  please call [phone] and ask for the triage nurse or your care team clinic nurse.  How to contact my care team:  Cesar: [email or web address]   Phone: [phone]   Fax: [phone]       [facility]:   Phone: [phone]  Hours: 8:00 AM - 6:00 PM  Medication Refills:  Call your pharmacy and they will forward the refill to us. Please allow 3 business days for your refills to be completed.       Normal or non-critical lab and imaging results will be communicated to you by MyChart, letter or phone within 7 days.  If you do not hear from us within 10 days, please call the clinic. If you have a critical or abnormal lab result, we will notify you by phone as soon as possible.       We now have PWIC (Pediatric Walk in Care)  Monday-Friday from 7:30-4. Simply walk in and be seen for your urgent needs like cough, fever, rash, diarrhea or vomiting, pink eye, UTI. No appointments needed. Ask one of the team for more information      -Your Care Team:    Dr. Saud Elizabeth - Internal Medicine/Pediatrics   Dr. Jose Booth - Family Medicine  Dr. Rosa Aguilar - Pediatrics  Dr. Tiffany Hobbs - Pediatrics  Joselin Little CNP - Family Practice Nurse Practitioner                           Follow-ups after your visit        Additional Services     GASTROENTEROLOGY ADULT REF PROCEDURE ONLY                 Who to contact     If you have questions or need follow up information about today's clinic visit or your schedule please contact [facility] directly at [phone].  Normal or non-critical lab and imaging results will be communicated to you by MyChart, letter or phone within 4 business days after the clinic has received the results. If you do not hear from us within 7 days, please contact the clinic through [employer] or phone. If you have a critical or abnormal lab result, we will notify you by phone as soon as possible.  Submit refill requests through [employer] or call your pharmacy and they will  forward the refill request to us. Please allow 3 business days for your refill to be completed.          Additional Information About Your Visit        [employer] DD Information     [employer] is an electronic gateway that provides easy, online access to your medical records. With [employer], you can request a clinic appointment, read your test results, renew a prescription or communicate with your care team.     To sign up for [employer] visit the website at [email or web address]   You will be asked to enter the access code listed below, as well as some personal information. Please follow the directions to create your username and password.     Your access code is: [email or web address]  Expires: 10/31/2017 11:38 AM     Your access code will  in 90 days. If you need help or a new code, please contact your [facility] or call [phone] for assistance.        Care EveryWhere ID     This is your Care EveryWhere ID. This could be used by other organizations to access your [place] medical records  [id number]        Your Vitals Were     Pulse Temperature Last Period Pulse Oximetry Breastfeeding? BMI (Body Mass Index)    77 97.9  F (36.6  C) (Temporal) 2017 96% No 24.84 kg/m2       Blood Pressure from Last 3 Encounters:   17 110/70   17 112/76   17 116/80    Weight from Last 3 Encounters:   17 140 lb 3.2 oz (63.6 kg)   17 140 lb 11.2 oz (63.8 kg)   17 141 lb 9.6 oz (64.2 kg)              We Performed the Following     CBC with platelets differential     Comprehensive metabolic panel     GASTROENTEROLOGY ADULT REF PROCEDURE ONLY     TSH with free T4 reflex          Today's Medication Changes          These changes are accurate as of: 17  9:54 AM.  If you have any questions, ask your nurse or doctor.               Start taking these medicines.        Dose/Directions    prenatal multivitamin plus iron 27-0.8 MG Tabs per tablet   Used for:  Family planning  counseling   Started by:  Joselin Little APRN CNP        Dose:  1 tablet   Take 1 tablet by mouth daily   Quantity:  100 tablet   Refills:  3            Where to get your medicines      These medications were sent to [facility] - [place], [place] - [address], [address]  [address], [address], [facility] [address]     Phone:  [phone]     prenatal multivitamin plus iron 27-0.8 MG Tabs per tablet                Primary Care Provider    [facility]       No address on file        Equal Access to Services     ALFONSO SHERMAN : Hadii wiliam lora hadasho Soomaali, waaxda luqadaha, qaybta kaalmada adecourtneyyasulaiman, nicole vargas . So [facility] [phone].    ATENCIÓN: Si habla español, tiene a dahl disposición servicios gratuitos de asistencia lingüística. [facility] [phone].    We comply with applicable federal civil rights laws and [place] laws. We do not discriminate on the basis of race, color, national origin, age, disability sex, sexual orientation or gender identity.            Thank you!     Thank you for choosing [facility]  for your care. Our goal is always to provide you with excellent care. Hearing back from our patients is one way we can continue to improve our services. Please take a few minutes to complete the written survey that you may receive in the mail after your visit with us. Thank you!             Your Updated Medication List - Protect others around you: Learn how to safely use, store and throw away your medicines at www.disposemymeds.org.          This list is accurate as of: 9/1/17  9:54 AM.  Always use your most recent med list.                   Brand Name Dispense Instructions for use Diagnosis    fluticasone 50 MCG/ACT spray    FLONASE    1 Bottle    Spray 1 spray into both nostrils 2 times daily    Chronic allergic rhinitis due to pollen, unspecified seasonality       omeprazole 20 MG CR  capsule    priLOSEC    30 capsule    Take 1 capsule (20 mg) by mouth daily    Globus sensation       prenatal multivitamin plus iron 27-0.8 MG Tabs per tablet     100 tablet    Take 1 tablet by mouth daily    Family planning counseling

## 2017-09-01 NOTE — NURSING NOTE
"Chief Complaint   Patient presents with     Diarrhea     diarrhea still ongoing x 6 months, note getting better       Initial /70 (BP Location: Right arm, Patient Position: Sitting, Cuff Size: Adult Regular)  Pulse 77  Temp 97.9  F (36.6  C) (Temporal)  Wt 140 lb 3.2 oz (63.6 kg)  LMP 08/21/2017  SpO2 96%  Breastfeeding? No  BMI 24.84 kg/m2 Estimated body mass index is 24.84 kg/(m^2) as calculated from the following:    Height as of 7/24/17: 5' 3\" (1.6 m).    Weight as of this encounter: 140 lb 3.2 oz (63.6 kg).  Medication Reconciliation: complete      JEFFREY Lainez      "

## 2017-09-02 ASSESSMENT — ANXIETY QUESTIONNAIRES: GAD7 TOTAL SCORE: 11

## 2017-09-04 PROBLEM — F43.23 ADJUSTMENT DISORDER WITH MIXED ANXIETY AND DEPRESSED MOOD: Status: ACTIVE | Noted: 2017-09-04

## 2017-09-05 ENCOUNTER — TELEPHONE (OUTPATIENT)
Dept: PHARMACY | Facility: CLINIC | Age: 32
End: 2017-09-05

## 2017-09-05 ENCOUNTER — HOSPITAL ENCOUNTER (OUTPATIENT)
Facility: AMBULATORY SURGERY CENTER | Age: 32
End: 2017-09-05
Attending: FAMILY MEDICINE | Admitting: FAMILY MEDICINE
Payer: COMMERCIAL

## 2017-09-05 NOTE — TELEPHONE ENCOUNTER
Called and spoke with patients , He verbalized understanding. Transferred to scheduling to get colonoscopy scheduled. Valarie Feliciano RN      TSH with free T4 reflex   Status:  Final result   Visible to patient:  No (Inaccessible in MyChart) Dx:  Diarrhea, unspecified type Order: [id number]       Notes Recorded by Joselin Little APRN CNP on 9/4/2017 at 7:26 PM  Please let patient know labs are normal and need to make appointment for colonoscopy as we had discussed           Ref Range & Units 4d ago   4mo ago        TSH 0.40 - 4.00 mU/L 1.46 2.09     Resulting Agency  [facility]          Specimen Collected: 09/01/17 10:00 AM Last Resulted: 09/01/17 10:36 AM                                Comprehensive metabolic panel   Status:  Final result   Visible to patient:  No (Inaccessible in MyChart) Dx:  Diarrhea, unspecified type Order: [id number]       Notes Recorded by Joselin Little APRN CNP on 9/4/2017 at 7:26 PM  Please let patient know labs are normal and need to make appointment for colonoscopy as we had discussed           Ref Range & Units 4d ago   4mo ago        Sodium 133 - 144 mmol/L 139 139      Potassium 3.4 - 5.3 mmol/L 4.1 4.0      Chloride 94 - 109 mmol/L 105 107      Carbon Dioxide 20 - 32 mmol/L 26 25      Anion Gap 3 - 14 mmol/L 8 7      Glucose 70 - 99 mg/dL 87 98CM      Urea Nitrogen 7 - 30 mg/dL 7 9      Creatinine 0.52 - 1.04 mg/dL 0.50 (L) 0.54      GFR Estimate >60 mL/min/1.7m2 >90 >90  Non African American GFR Calc      Comments: Non  GFR Calc      GFR Estimate If Black >60 mL/min/1.7m2 >90 >90  African American GFR Calc      Comments:  GFR Calc      Calcium 8.5 - 10.1 mg/dL 8.8 8.6      Bilirubin Total 0.2 - 1.3 mg/dL 0.4       Albumin 3.4 - 5.0 g/dL 3.7       Protein Total 6.8 - 8.8 g/dL 7.8       Alkaline Phosphatase 40 - 150 U/L 56       ALT 0 - 50 U/L 28       AST 0 - 45 U/L 20      Resulting Agency  [facility]           Specimen Collected: 09/01/17 10:00 AM Last Resulted: 09/01/17 10:30 AM                CM=Additional comments                      CBC with platelets differential   Status:  Final result   Visible to patient:  No (Inaccessible in MyChart) Dx:  Diarrhea, unspecified type Order: [id number]       Notes Recorded by Joselin Little APRN CNP on 9/4/2017 at 7:26 PM  Please let patient know labs are normal and need to make appointment for colonoscopy as we had discussed        Ref Range & Units 4d ago       WBC 4.0 - 11.0 10e9/L 8.0     RBC Count 3.8 - 5.2 10e12/L 4.69     Hemoglobin 11.7 - 15.7 g/dL 14.4     Hematocrit 35.0 - 47.0 % 42.2     MCV 78 - 100 fl 90     MCH 26.5 - 33.0 pg 30.7     MCHC 31.5 - 36.5 g/dL 34.1     RDW 10.0 - 15.0 % 13.0     Platelet Count 150 - 450 10e9/L 240     Diff Method  Automated Method     % Neutrophils % 53.4     % Lymphocytes % 36.2     % Monocytes % 6.6     % Eosinophils % 3.0     % Basophils % 0.8     Absolute Neutrophil 1.6 - 8.3 10e9/L 4.3     Absolute Lymphocytes 0.8 - 5.3 10e9/L 2.9     Absolute Monocytes 0.0 - 1.3 10e9/L 0.5     Absolute Eosinophils 0.0 - 0.7 10e9/L 0.2     Absolute Basophils 0.0 - 0.2 10e9/L 0.1     Resulting Agency  [facility]

## 2017-10-06 ENCOUNTER — HOSPITAL ENCOUNTER (OUTPATIENT)
Facility: AMBULATORY SURGERY CENTER | Age: 32
Discharge: HOME | End: 2017-10-06
Attending: INTERNAL MEDICINE | Admitting: INTERNAL MEDICINE
Payer: COMMERCIAL

## 2017-10-06 ENCOUNTER — SURGERY (OUTPATIENT)
Age: 32
End: 2017-10-06

## 2017-10-06 VITALS
HEART RATE: 77 BPM | DIASTOLIC BLOOD PRESSURE: 80 MMHG | SYSTOLIC BLOOD PRESSURE: 118 MMHG | OXYGEN SATURATION: 97 % | TEMPERATURE: 98.9 F | RESPIRATION RATE: 16 BRPM

## 2017-10-06 RX ORDER — ONDANSETRON 2 MG/ML
4 INJECTION INTRAMUSCULAR; INTRAVENOUS
Status: DISCONTINUED | OUTPATIENT
Start: 2017-10-06 | End: 2017-10-07 | Stop reason: HOSPADM

## 2017-10-06 RX ORDER — LIDOCAINE 40 MG/G
CREAM TOPICAL
Status: DISCONTINUED | OUTPATIENT
Start: 2017-10-06 | End: 2017-10-07 | Stop reason: HOSPADM

## 2017-10-06 NOTE — PROGRESS NOTES
Patient scheduled for colonoscopy today for evaluation of one year history of diarrhea.  During consent discussion she told me she has been having unprotected sex up until a few days before.  I told her it would be best not take the risk of fetal injury with the medications we give  We decided to cancel the procedure today.  She will contact her PCP to develop a plan to re-schedule at a time when she can use contraception at least between the time of her last menstrual period and the procedure.

## 2017-10-06 NOTE — OR NURSING
Face to face time with patient 30 minutes. Pt case cancelled, see MD note. Pt understanding and agrees to plan.

## 2017-10-27 ENCOUNTER — OFFICE VISIT (OUTPATIENT)
Dept: PEDIATRICS | Facility: CLINIC | Age: 32
End: 2017-10-27
Payer: COMMERCIAL

## 2017-10-27 VITALS
OXYGEN SATURATION: 99 % | SYSTOLIC BLOOD PRESSURE: 122 MMHG | BODY MASS INDEX: 25.31 KG/M2 | WEIGHT: 142.9 LBS | TEMPERATURE: 97.9 F | DIASTOLIC BLOOD PRESSURE: 60 MMHG | HEART RATE: 106 BPM

## 2017-10-27 DIAGNOSIS — R19.7 DIARRHEA, UNSPECIFIED TYPE: ICD-10-CM

## 2017-10-27 DIAGNOSIS — Z32.00 PREGNANCY EXAMINATION OR TEST, PREGNANCY UNCONFIRMED: Primary | ICD-10-CM

## 2017-10-27 LAB — BETA HCG QUAL IFA URINE: POSITIVE

## 2017-10-27 PROCEDURE — 99213 OFFICE O/P EST LOW 20 MIN: CPT | Performed by: NURSE PRACTITIONER

## 2017-10-27 PROCEDURE — 84703 CHORIONIC GONADOTROPIN ASSAY: CPT | Performed by: NURSE PRACTITIONER

## 2017-10-27 ASSESSMENT — ANXIETY QUESTIONNAIRES
6. BECOMING EASILY ANNOYED OR IRRITABLE: NOT AT ALL
1. FEELING NERVOUS, ANXIOUS, OR ON EDGE: SEVERAL DAYS
5. BEING SO RESTLESS THAT IT IS HARD TO SIT STILL: SEVERAL DAYS
GAD7 TOTAL SCORE: 2
3. WORRYING TOO MUCH ABOUT DIFFERENT THINGS: NOT AT ALL
7. FEELING AFRAID AS IF SOMETHING AWFUL MIGHT HAPPEN: NOT AT ALL
IF YOU CHECKED OFF ANY PROBLEMS ON THIS QUESTIONNAIRE, HOW DIFFICULT HAVE THESE PROBLEMS MADE IT FOR YOU TO DO YOUR WORK, TAKE CARE OF THINGS AT HOME, OR GET ALONG WITH OTHER PEOPLE: SOMEWHAT DIFFICULT
2. NOT BEING ABLE TO STOP OR CONTROL WORRYING: NOT AT ALL

## 2017-10-27 ASSESSMENT — PATIENT HEALTH QUESTIONNAIRE - PHQ9
SUM OF ALL RESPONSES TO PHQ QUESTIONS 1-9: 10
5. POOR APPETITE OR OVEREATING: NOT AT ALL

## 2017-10-27 NOTE — MR AVS SNAPSHOT
After Visit Summary   10/27/2017    Lou Rivers    MRN: [id number]           Patient Information     Date Of Birth          1985        Visit Information        Provider Department      10/27/2017 3:45 PM Joselin Little APRN CNP; [employer] [facility]        Today's Diagnoses     Pregnancy examination or test, pregnancy unconfirmed    -  1    Diarrhea, unspecified type          Care Instructions    PLAN:   1.   Symptomatic therapy suggested: continue prenatal vitamins.    2.  Orders Placed This Encounter   Procedures     Beta HCG qual IFA urine - [facility] and [place]     GASTROENTEROLOGY ADULT REF CONSULT ONLY       3. Patient needs to follow up in if no improvement,or sooner if worsening of symptoms or other symptoms develop.  CONSULTATION/REFERRAL to Preferred Location: [place] [facility] [phone]  Appointment with ob/gyn        It was a pleasure seeing you today at the [facility]. Thank you for allowing us to care for you today. We truly hope we provided you with the excellent service you deserve. Please let us know if there is anything else we can do for you so we can be sure you are leaving completley satisfied with your care experience.       General information about your clinic   Clinic Hours Lab Hours (Appointments are required)   Mon-Thurs: 7:30 AM - 7 PM Mon-Thurs: 7:30 AM - 7 PM   Fri: 7:30 AM - 5 PM Fri: 7:30 AM - 5 PM        After Hours Nurse Advise & Appts:  [facility] Nurse Advisors: [phone]  [facility] On Call: to make appointments anytime: [phone] On Call Physician: call [phone] and answering service will page the on call physician.        For urgent appointments, please call [phone] and ask for the triage nurse or your care team clinic nurse.  How to contact my care team:  MyChart: [email or web address]   Phone: [phone]   Fax: [phone]       [facility]:   Phone:  [phone]  Hours: 8:00 AM - 6:00 PM  Medication Refills:  Call your pharmacy and they will forward the refill to us. Please allow 3 business days for your refills to be completed.       Normal or non-critical lab and imaging results will be communicated to you by MyChart, letter or phone within 7 days.  If you do not hear from us within 10 days, please call the clinic. If you have a critical or abnormal lab result, we will notify you by phone as soon as possible.       We now have PWIC (Pediatric Walk in Care)  Monday-Friday from 7:30-4. Simply walk in and be seen for your urgent needs like cough, fever, rash, diarrhea or vomiting, pink eye, UTI. No appointments needed. Ask one of the team for more information      -Your Care Team:    Dr. Saud Elizabeth - Internal Medicine/Pediatrics   Dr. Jose Booth - Family Medicine  Dr. Rosa Aguilar - Pediatrics  Joselin Little CNP - Family Practice Nurse Practitioner  Dr. Tiffany Hobbs - Pediatrics                       Follow-ups after your visit        Additional Services     GASTROENTEROLOGY ADULT REF CONSULT ONLY       Preferred Location: [place] [facility] [phone]      Please be aware that coverage of these services is subject to the terms and limitations of your health insurance plan.  Call member services at your health plan with any benefit or coverage questions.  Any procedures must be performed at a [facility] facility OR coordinated by your clinic's referral office.    Please bring the following with you to your appointment:    (1) Any X-Rays, CTs or MRIs which have been performed.  Contact the facility where they were done to arrange for  prior to your scheduled appointment.    (2) List of current medications   (3) This referral request   (4) Any documents/labs given to you for this referral                  Who to contact     If you have questions or need follow up information about today's clinic visit or your schedule please contact [facility]  directly at [phone].  Normal or non-critical lab and imaging results will be communicated to you by GadgetATMhart, letter or phone within 4 business days after the clinic has received the results. If you do not hear from us within 7 days, please contact the clinic through [employer] or phone. If you have a critical or abnormal lab result, we will notify you by phone as soon as possible.  Submit refill requests through [employer] or call your pharmacy and they will forward the refill request to us. Please allow 3 business days for your refill to be completed.          Additional Information About Your Visit        [employer] Information     [employer] is an electronic gateway that provides easy, online access to your medical records. With [employer], you can request a clinic appointment, read your test results, renew a prescription or communicate with your care team.     To sign up for [employer] visit the website at [email or web address]   You will be asked to enter the access code listed below, as well as some personal information. Please follow the directions to create your username and password.     Your access code is: [email or web address]  Expires: 10/31/2017 11:38 AM     Your access code will  in 90 days. If you need help or a new code, please contact your [facility] or call [phone] for assistance.        Care EveryWhere ID     This is your Care EveryWhere ID. This could be used by other organizations to access your [place] medical records  [id number]        Your Vitals Were     Pulse Temperature Last Period Pulse Oximetry Breastfeeding? BMI (Body Mass Index)    106 97.9  F (36.6  C) (Temporal) 2017 99% No 25.31 kg/m2       Blood Pressure from Last 3 Encounters:   10/27/17 122/60   10/06/17 118/80   17 110/70    Weight from Last 3 Encounters:   10/27/17 142 lb 14.4 oz (64.8 kg)   17 140 lb 3.2 oz (63.6 kg)   17 140 lb 11.2 oz (63.8 kg)              We Performed  the Following     Beta HCG qual IFA urine - [facility] and [place]     GASTROENTEROLOGY ADULT REF CONSULT ONLY        Primary Care Provider Office Phone # Fax #    Donte [facility] [phone] [address]  [facility] [address]        Equal Access to Services     ALFONSO SHERMAN : Hadii aad ku hadasho Soomaali, waaxda luqadaha, qaybta kaalmada adeegyada, waxay idiin hayaan adeeg lianna lasammn . So [facility] [phone].    ATENCIÓN: Si habla español, tiene a dahl disposición servicios gratuitos de asistencia lingüística. Phi al [phone].    We comply with applicable federal civil rights laws and [place] laws. We do not discriminate on the basis of race, color, national origin, age, disability, sex, sexual orientation, or gender identity.            Thank you!     Thank you for choosing [facility]  for your care. Our goal is always to provide you with excellent care. Hearing back from our patients is one way we can continue to improve our services. Please take a few minutes to complete the written survey that you may receive in the mail after your visit with us. Thank you!             Your Updated Medication List - Protect others around you: Learn how to safely use, store and throw away your medicines at www.disposemymeds.org.          This list is accurate as of: 10/27/17  4:19 PM.  Always use your most recent med list.                   Brand Name Dispense Instructions for use Diagnosis    fluticasone 50 MCG/ACT spray    FLONASE    1 Bottle    Spray 1 spray into both nostrils 2 times daily    Chronic allergic rhinitis due to pollen, unspecified seasonality       prenatal multivitamin plus iron 27-0.8 MG Tabs per tablet     100 tablet    Take 1 tablet by mouth daily    Family planning counseling

## 2017-10-27 NOTE — PROGRESS NOTES
SUBJECTIVE:   Lou Rivers is a 32 year old female who presents to clinic today for the following health issues:  This patient is accompanied in the office by her  .    Confirmation of pregnancy. LMP:9/18/17    PROBLEMS TO ADD ON...  Diarrhea comes and goes for several months  Had diarrhea today and then before a few months   Stool cultures in August were normal.   Was supposed to get colonoscopy but then got pregnant       Problem list and histories reviewed & adjusted, as indicated.  Additional history: as documented    Patient Active Problem List   Diagnosis     Allergic rhinitis     Adjustment disorder with mixed anxiety and depressed mood     Past Surgical History:   Procedure Laterality Date     LASIK BILATERAL Bilateral      SINUS SURGERY         Social History   Substance Use Topics     Smoking status: Never Smoker     Smokeless tobacco: Never Used     Alcohol use No     Family History   Problem Relation Age of Onset     Coronary Artery Disease Mother      Hypertension Mother      Other Cancer Mother      throat cancer     Other - See Comments Mother      neck problems     Thyroid Disease Mother      Coronary Artery Disease Father      Hypertension Father      Hyperlipidemia Father      Other - See Comments Father      herniated disc in lumbar     Gout Father      Liver Cancer Maternal Grandmother      Myocardial Infarction Maternal Grandfather      DIABETES No family hx of      CEREBROVASCULAR DISEASE No family hx of      Breast Cancer No family hx of      Colon Cancer No family hx of      Prostate Cancer No family hx of      Depression No family hx of      Anxiety Disorder No family hx of      MENTAL ILLNESS No family hx of      Substance Abuse No family hx of      Anesthesia Reaction No family hx of      Asthma No family hx of      OSTEOPOROSIS No family hx of      Genetic Disorder No family hx of      Obesity No family hx of      Unknown/Adopted No family hx of          Current Outpatient  Prescriptions   Medication Sig Dispense Refill     Prenatal Vit-Fe Fumarate-FA (PRENATAL MULTIVITAMIN PLUS IRON) 27-0.8 MG TABS per tablet Take 1 tablet by mouth daily 100 tablet 3     fluticasone (FLONASE) 50 MCG/ACT spray Spray 1 spray into both nostrils 2 times daily 1 Bottle 11     No Known Allergies  Labs reviewed in EPIC      Reviewed and updated as needed this visit by clinical staffTobacco  Allergies  Meds  Med Hx  Surg Hx  Fam Hx  Soc Hx      Reviewed and updated as needed this visit by Provider         ROS:  Constitutional, HEENT, cardiovascular, pulmonary, gi and gu systems are negative, except as otherwise noted.      OBJECTIVE:   /60 (BP Location: Right arm, Patient Position: Sitting, Cuff Size: Adult Regular)  Pulse 106  Temp 97.9  F (36.6  C) (Temporal)  Wt 142 lb 14.4 oz (64.8 kg)  LMP 09/18/2017  SpO2 99%  Breastfeeding? No  BMI 25.31 kg/m2  Body mass index is 25.31 kg/(m^2).  GENERAL: healthy, alert and no distress  NECK: no adenopathy, no asymmetry, masses, or scars and thyroid normal to palpation  RESP: lungs clear to auscultation - no rales, rhonchi or wheezes  CV: regular rates and rhythm, peripheral pulses strong and no peripheral edema  ABDOMEN: soft, nontender, no hepatosplenomegaly, no masses and bowel sounds normal  MS: no gross musculoskeletal defects noted, no edema  PSYCH: mentation appears normal, affect normal/bright    Diagnostic Test Results:  Results for orders placed or performed in visit on 10/27/17   Beta HCG qual IFA urine - [facility]   Result Value Ref Range    Beta HCG Qual IFA Urine Positive (A) NEG^Negative          ASSESSMENT/PLAN:       Lou was seen today for confirmation of pregnancy.    Diagnoses and all orders for this visit:    Pregnancy examination or test, pregnancy unconfirmed  -     Beta HCG qual IFA urine - [facility] and [facility]    Diarrhea, unspecified type  -     GASTROENTEROLOGY ADULT REF CONSULT ONLY      PLAN:   1.    Symptomatic therapy suggested: continue prenatal vitamins.    2.  Orders Placed This Encounter   Procedures     Beta HCG qual IFA urine - [facility] and [facility]     GASTROENTEROLOGY ADULT REF CONSULT ONLY       3. Patient needs to follow up in if no improvement,or sooner if worsening of symptoms or other symptoms develop.  CONSULTATION/REFERRAL to Preferred Location: [facility] [phone]  Appointment with ob/gyn        See Patient Instructions    LAINEY Jang [facility]

## 2017-10-27 NOTE — PATIENT INSTRUCTIONS
PLAN:   1.   Symptomatic therapy suggested: continue prenatal vitamins.    2.  Orders Placed This Encounter   Procedures     Beta HCG qual IFA urine - [facility] and [place]     GASTROENTEROLOGY ADULT REF CONSULT ONLY       3. Patient needs to follow up in if no improvement,or sooner if worsening of symptoms or other symptoms develop.  CONSULTATION/REFERRAL to Preferred Location: [facility] [phone]  Appointment with ob/gyn        It was a pleasure seeing you today at the [facility]. Thank you for allowing us to care for you today. We truly hope we provided you with the excellent service you deserve. Please let us know if there is anything else we can do for you so we can be sure you are leaving completley satisfied with your care experience.       General information about your clinic   Clinic Hours Lab Hours (Appointments are required)   Mon-Thurs: 7:30 AM - 7 PM Mon-Thurs: 7:30 AM - 7 PM   Fri: 7:30 AM - 5 PM Fri: 7:30 AM - 5 PM        After Hours Nurse Advise & Appts:  [facility]: [phone]  [facility] On Call: to make appointments anytime: [phone] On Call Physician: call [phone] and answering service will page the on call physician.        For urgent appointments, please call [phone] and ask for the triage nurse or your care team clinic nurse.  How to contact my care team:  Aleksandarhart: [email or web address]   Phone: [phone]   Fax: [phone]       [facility]:   Phone: [phone]  Hours: 8:00 AM - 6:00 PM  Medication Refills:  Call your pharmacy and they will forward the refill to us. Please allow 3 business days for your refills to be completed.       Normal or non-critical lab and imaging results will be communicated to you by MyChart, letter or phone within 7 days.  If you do not hear from us within 10 days, please call the clinic. If you have a critical or abnormal lab result, we will notify you by phone as soon as possible.       We  now have PWIC (Pediatric Walk in Care)  Monday-Friday from 7:30-4. Simply walk in and be seen for your urgent needs like cough, fever, rash, diarrhea or vomiting, pink eye, UTI. No appointments needed. Ask one of the team for more information      -Your Care Team:    Dr. Saud Elizabeth - Internal Medicine/Pediatrics   Dr. Jose Booth - Family Medicine  Dr. Rosa Aguilar - Pediatrics  Joselin Little CNP - Family Practice Nurse Practitioner  Dr. Tiffany Hobbs - Pediatrics

## 2017-10-28 ASSESSMENT — ANXIETY QUESTIONNAIRES: GAD7 TOTAL SCORE: 2

## 2017-10-30 ENCOUNTER — PRENATAL OFFICE VISIT (OUTPATIENT)
Dept: OBGYN | Facility: CLINIC | Age: 32
End: 2017-10-30
Payer: COMMERCIAL

## 2017-10-30 VITALS
OXYGEN SATURATION: 97 % | BODY MASS INDEX: 25.45 KG/M2 | DIASTOLIC BLOOD PRESSURE: 76 MMHG | WEIGHT: 143.6 LBS | HEART RATE: 101 BPM | SYSTOLIC BLOOD PRESSURE: 122 MMHG | HEIGHT: 63 IN

## 2017-10-30 DIAGNOSIS — Z23 NEED FOR PROPHYLACTIC VACCINATION AND INOCULATION AGAINST INFLUENZA: ICD-10-CM

## 2017-10-30 DIAGNOSIS — F41.9 ANXIETY: ICD-10-CM

## 2017-10-30 DIAGNOSIS — O09.891 SUPERVISION OF OTHER HIGH RISK PREGNANCIES, FIRST TRIMESTER: ICD-10-CM

## 2017-10-30 DIAGNOSIS — Z36.87 UNSURE OF LMP (LAST MENSTRUAL PERIOD) AS REASON FOR ULTRASOUND SCAN: Primary | ICD-10-CM

## 2017-10-30 LAB
ABO + RH BLD: NORMAL
ABO + RH BLD: NORMAL
ERYTHROCYTE [DISTWIDTH] IN BLOOD BY AUTOMATED COUNT: 13.5 % (ref 10–15)
HCT VFR BLD AUTO: 40.6 % (ref 35–47)
HGB BLD-MCNC: 14 G/DL (ref 11.7–15.7)
MCH RBC QN AUTO: 31 PG (ref 26.5–33)
MCHC RBC AUTO-ENTMCNC: 34.5 G/DL (ref 31.5–36.5)
MCV RBC AUTO: 90 FL (ref 78–100)
PLATELET # BLD AUTO: 237 10E9/L (ref 150–450)
RBC # BLD AUTO: 4.52 10E12/L (ref 3.8–5.2)
SPECIMEN EXP DATE BLD: NORMAL
WBC # BLD AUTO: 11.6 10E9/L (ref 4–11)

## 2017-10-30 PROCEDURE — 99207 ZZC FIRST OB VISIT: CPT | Performed by: OBSTETRICS & GYNECOLOGY

## 2017-10-30 PROCEDURE — 85027 COMPLETE CBC AUTOMATED: CPT | Performed by: OBSTETRICS & GYNECOLOGY

## 2017-10-30 PROCEDURE — 86762 RUBELLA ANTIBODY: CPT | Performed by: OBSTETRICS & GYNECOLOGY

## 2017-10-30 PROCEDURE — 90686 IIV4 VACC NO PRSV 0.5 ML IM: CPT | Performed by: OBSTETRICS & GYNECOLOGY

## 2017-10-30 PROCEDURE — 87340 HEPATITIS B SURFACE AG IA: CPT | Performed by: OBSTETRICS & GYNECOLOGY

## 2017-10-30 PROCEDURE — 87389 HIV-1 AG W/HIV-1&-2 AB AG IA: CPT | Performed by: OBSTETRICS & GYNECOLOGY

## 2017-10-30 PROCEDURE — 36415 COLL VENOUS BLD VENIPUNCTURE: CPT | Performed by: OBSTETRICS & GYNECOLOGY

## 2017-10-30 PROCEDURE — 87491 CHLMYD TRACH DNA AMP PROBE: CPT | Performed by: OBSTETRICS & GYNECOLOGY

## 2017-10-30 PROCEDURE — 86900 BLOOD TYPING SEROLOGIC ABO: CPT | Performed by: OBSTETRICS & GYNECOLOGY

## 2017-10-30 PROCEDURE — 86901 BLOOD TYPING SEROLOGIC RH(D): CPT | Performed by: OBSTETRICS & GYNECOLOGY

## 2017-10-30 PROCEDURE — 87591 N.GONORRHOEAE DNA AMP PROB: CPT | Performed by: OBSTETRICS & GYNECOLOGY

## 2017-10-30 PROCEDURE — 87086 URINE CULTURE/COLONY COUNT: CPT | Performed by: OBSTETRICS & GYNECOLOGY

## 2017-10-30 PROCEDURE — 90471 IMMUNIZATION ADMIN: CPT | Performed by: OBSTETRICS & GYNECOLOGY

## 2017-10-30 NOTE — NURSING NOTE
"Chief Complaint   Patient presents with     Prenatal Care     6w--vomitting-tummy pain-decrease sleep       Initial /76  Pulse 101  Ht 1.588 m (5' 2.5\")  Wt 65.1 kg (143 lb 9.6 oz)  LMP 09/18/2017  SpO2 97%  Breastfeeding? No  BMI 25.85 kg/m2 Estimated body mass index is 25.85 kg/(m^2) as calculated from the following:    Height as of this encounter: 1.588 m (5' 2.5\").    Weight as of this encounter: 65.1 kg (143 lb 9.6 oz).  Medication Reconciliation:   Sneha Edgar, [facility]  October 30, 2017 1:14 PM        "

## 2017-10-30 NOTE — PROGRESS NOTES
"Lou is a 32 year old female, , who is here today for her first OB visit at 6 0/7 weeks gestation and is new to the [facility] ob dept.  She has had a positive home pregnancy test.  This was a planned pregnancy but she requests at primary C/S at term due to \"vaginal sensitivity\" coupled with high anxiety and fear of labor and a \"natural delivery.\"  Therefore, I am referring her to Psychiatry for assistance since she seems unusually frightened of pain.  Prior to each part of today's physical exam, she would ask \"pain?\"  She denies any hx of rape or incest but I found that her English was minimal.     ROS: Ten point review of systems was reviewed and negative except the above.    Gyn Hx:      Past Medical History:   Diagnosis Date     Allergic rhinitis      History of anxiety disorder      Latent tuberculosis      Past Surgical History:   Procedure Laterality Date     LASIK BILATERAL Bilateral      SINUS SURGERY       Patient Active Problem List   Diagnosis     Allergic rhinitis     Adjustment disorder with mixed anxiety and depressed mood       ALL/Meds: Her medication and allergy histories were reviewed and are documented in their appropriate chart areas.    SH: Reviewed and documented in the appropriate area of the chart.    FH: Her family history was reviewed and documented in its appropriate chart area.    PE: /76  Pulse 101  Ht 1.588 m (5' 2.5\")  Wt 65.1 kg (143 lb 9.6 oz)  LMP 2017  SpO2 97%  Breastfeeding? No  BMI 25.85 kg/m2  Body mass index is 25.85 kg/(m^2).    Urine HCG was + on 10/27/17  Neck - supple without palpable mass  Hrt - RRR without murmur  Lungs - CTAB  Abd - soft, gravid, unable to hear fhts with the doppler today  Pelvic - very difficult exam since the pt kept bringing her knees together and locking them.  However, no vaginal lesions or growths were noted.  She is not bleeding.  She declined a GC and Chlamydia test per culture but agreed to via " urine.    A/P:  IUP at 6 0/7 weeks gestation at first OB visit   - I discussed with her nutrition and medication concerns related to pregnancy.  We discussed folic acid supplementation.  We reviewed prenatal care.  She is given the opportunity to ask questions and have them answered.  Schedule an OB US for dates since the pt is not sure of her LMP  Check labwork today  She would like a flu vaccine so this was provided today  Refer to Psychiatry to determine if a C/S is warranted for mental health issues  She is to continue taking a PNV daily po    30 minutes were spent face to face with the patient today discussing her history, diagnosis, and follow-up plan as noted above.  Over 50% of the visit was spent in counseling and coordination of care.    Total Visit Time: 30 minutes.    Orders Placed This Encounter   Procedures     US OB < 14 Weeks w Transvaginal     FLU VAC, SPLIT VIRUS IM > 3 YO (QUADRIVALENT) [98112]     Vaccine Administration, Initial [81730]     Rubella Antibody IgG Quantitative     HIV Antigen Antibody Combo     Hepatitis B surface antigen     CBC with platelets     MENTAL HEALTH REFERRAL     ABO and Rh

## 2017-10-30 NOTE — PATIENT INSTRUCTIONS
If you have any questions regarding your visit, Please contact your care team.    Women s Health CLINIC HOURS TELEPHONE NUMBER   Wen DO Anastasiia.    AVELINA Hoover -    KENNETH Ngo RN       Monday, Wednesday, Thursday and Friday, [place]  8:30a.m-5:00 p.m   [facility]  [address] [place] [address]  [phone] ask for [facility]    Imaging [phone]       Urgent Care locations:    [facility] Saturday and Sunday   9 am - 5 pm    Monday-Friday   12 pm - 8 pm  Saturday and Sunday   9 am - 5 pm   [phone]     [facility] Labor and Delivery:  [phone]    If you need a medication refill, please contact your pharmacy. Please allow 3 business days for your refill to be completed.  As always, Thank you for trusting us with your healthcare needs!

## 2017-10-31 LAB
BACTERIA SPEC CULT: NORMAL
C TRACH DNA SPEC QL NAA+PROBE: NEGATIVE
HBV SURFACE AG SERPL QL IA: NONREACTIVE
HIV 1+2 AB+HIV1 P24 AG SERPL QL IA: NONREACTIVE
N GONORRHOEA DNA SPEC QL NAA+PROBE: NEGATIVE
RUBV IGG SERPL IA-ACNC: 12 IU/ML
SPECIMEN SOURCE: NORMAL

## 2017-11-10 ENCOUNTER — RADIANT APPOINTMENT (OUTPATIENT)
Dept: ULTRASOUND IMAGING | Facility: CLINIC | Age: 32
End: 2017-11-10
Attending: OBSTETRICS & GYNECOLOGY
Payer: COMMERCIAL

## 2017-11-10 DIAGNOSIS — Z36.87 UNSURE OF LMP (LAST MENSTRUAL PERIOD) AS REASON FOR ULTRASOUND SCAN: ICD-10-CM

## 2017-11-10 PROCEDURE — 76801 OB US < 14 WKS SINGLE FETUS: CPT

## 2017-11-10 PROCEDURE — 76817 TRANSVAGINAL US OBSTETRIC: CPT

## 2017-11-13 ENCOUNTER — TELEPHONE (OUTPATIENT)
Dept: OBGYN | Facility: CLINIC | Age: 32
End: 2017-11-13

## 2017-11-13 NOTE — TELEPHONE ENCOUNTER
Patient's spouse, Jean, called to report vaginal bleeding with Luo that started over the weekend.  There is a form signed that patient declines the use of an .  There is no authorization to discuss on file so I explained this to Jean.  He understands and will complete this form when he comes to the clinic for her next appointment. He asked me not to use an .  He said this will make her very anxious.  He said Lou speaks some english and felt she could talk through this without an .  I called patient.  English is limited but could understand that she noticed light spotting after she voided today.  The color of the blood was brown.  She denied abdominal or pelvic pain.  She is not experiencing a heavy period like flow. Her recent ultrasound on 10/9/2017 was normal.  I did advise pelvic rest including no intercourse, no heavy lifting or strenuous exercise.  If she has concerns, she will call back.  Next ob visit is 12/1/2017.  Keara Mackey RN

## 2017-11-29 ENCOUNTER — TELEPHONE (OUTPATIENT)
Dept: OBGYN | Facility: CLINIC | Age: 32
End: 2017-11-29

## 2017-11-29 NOTE — TELEPHONE ENCOUNTER
"[facility] Call Center    Phone Message    Name of Caller: Jean    Phone Number: Home number on file [phone] (home)    Best time to return call: any    May a detailed message be left on voicemail: yes    Relation to patient: Other Name: spouse  Relationship:   Is there legal documentation in chart to discuss information with this person:       Reason for Call: Symptoms or Concerns     Does patient have any of the following \"red flag\" symptoms: None    Does patient have any \"Red Flag\" symptoms? No.     Current symptom or concern: vomiting, currently pregnant    Symptoms have been present for:  Almost everyday, 11 weeks pregnant  Has patient previously been seen for this? Yes    By Anastasiia: est pre    Date:   May call patient, she speaks english, does not need       Action Taken: Message routed to:  [facility] p [phone]  "

## 2017-11-29 NOTE — TELEPHONE ENCOUNTER
"Noted RYAN 06-24-18 10w 3d. Appt with Dr. Laurent on 12-01-17.  Phone call to patient. It was extremely difficult to hear patient as she was not talking directly into phone or else speaking too softly. Offered to call back with an  and patient declined. She stated she understood English. Explained cannot speak to her  without Authorization to Discuss Protected Health Information on file. Patient verbalized understanding. Patient verbalized concern that she is loosing \"too much weight.\" Patient reported she used to weigh about 146# and now is 138#. Patient stated does not remember when she weighed 146#. Reviewed weights recorded in Epic with patient. Patient was reassured. Explained some times women can loose a little weight in the beginning. Patient stated she vomits occasionally but is able to tolerate 2-3 meals/day. Patient stated she drinks water, milk and \"just a little bit of coffee.\" Patient stated she is urinating \"a lot.\" Gave home care advise from [facility] handout Taking Medicine during Pregnancy. Encouraged small frequent meals, small amts of fluids throughout the day, monitor sx's and if worsen to call back to clinic. Verified appt with Dr. Laurent on 12-01-17. Explained if she wants clinic to be able to discuss information with , to request Auth to Discuss from  at time of check in. Patient verbalized understanding and stated she was thankful for the advise. \"It helped a lot and I feel better now.\" Maru Restrepo RN, BAN        "

## 2017-12-01 ENCOUNTER — PRENATAL OFFICE VISIT (OUTPATIENT)
Dept: OBGYN | Facility: CLINIC | Age: 32
End: 2017-12-01
Payer: COMMERCIAL

## 2017-12-01 ENCOUNTER — TELEPHONE (OUTPATIENT)
Dept: OBGYN | Facility: CLINIC | Age: 32
End: 2017-12-01

## 2017-12-01 VITALS
TEMPERATURE: 98.1 F | OXYGEN SATURATION: 98 % | SYSTOLIC BLOOD PRESSURE: 120 MMHG | BODY MASS INDEX: 25.34 KG/M2 | WEIGHT: 140.8 LBS | DIASTOLIC BLOOD PRESSURE: 76 MMHG | HEART RATE: 87 BPM

## 2017-12-01 DIAGNOSIS — O21.0 HYPEREMESIS GRAVIDARUM: Primary | ICD-10-CM

## 2017-12-01 DIAGNOSIS — O20.9 HEMORRHAGE IN EARLY PREGNANCY, ANTEPARTUM: ICD-10-CM

## 2017-12-01 LAB — KETONES UR STRIP-MCNC: NEGATIVE MG/DL

## 2017-12-01 PROCEDURE — 81003 URINALYSIS AUTO W/O SCOPE: CPT | Performed by: OBSTETRICS & GYNECOLOGY

## 2017-12-01 PROCEDURE — 36415 COLL VENOUS BLD VENIPUNCTURE: CPT | Performed by: OBSTETRICS & GYNECOLOGY

## 2017-12-01 PROCEDURE — 99207 ZZC COMPLICATED OB VISIT: CPT | Performed by: OBSTETRICS & GYNECOLOGY

## 2017-12-01 RX ORDER — ONDANSETRON 4 MG/1
4 TABLET, FILM COATED ORAL EVERY 6 HOURS PRN
Qty: 30 TABLET | Refills: 1 | Status: SHIPPED | OUTPATIENT
Start: 2017-12-01 | End: 2018-02-28

## 2017-12-01 NOTE — MR AVS SNAPSHOT
After Visit Summary   12/1/2017    Lou Rivers    MRN: [id number]           Patient Information     Date Of Birth          1985        Visit Information        Provider Department      12/1/2017 4:30 PM Wen Laurent DO [facility]        Today's Diagnoses     Hyperemesis gravidarum    -  1    Hemorrhage in early pregnancy, antepartum          Care Instructions                                                         If you have any questions regarding your visit, Please contact your care team.    Women s Health CLINIC HOURS TELEPHONE NUMBER   Wen Laurent DO.    AVELINA Hoover -    KENNETH Ngo RN       [facility], [facility]  8:30a.m-5:00 p.m   [facility]  [address] [place] [address] [phone] ask for [facility]    Imaging [phone]       Urgent Care locations:    [facility] Saturday and Sunday   9 am - 5 pm    Monday-Friday   12 pm - 8 pm  Saturday and Sunday   9 am - 5 pm   [phone]     [facility] Labor and Delivery:  [phone]    If you need a medication refill, please contact your pharmacy. Please allow 3 business days for your refill to be completed.  As always, Thank you for trusting us with your healthcare needs!                Follow-ups after your visit        Follow-up notes from your care team     Return in about 4 weeks (around 12/29/2017), or next prenatal visit or earlier prn.      Your next 10 appointments already scheduled     Jan 05, 2018  3:45 PM CST   ESTABLISHED PRENATAL with Sneha Guajardo MD   [facility] ([facility])    [address]  [facility] [address] [phone]              Future tests that were ordered for you today     Open Future Orders        Priority Expected Expires Ordered    US OB Ltd One Or More Fetus  "FU/Repeat Routine  2018            Who to contact     If you have questions or need follow up information about today's clinic visit or your schedule please contact [facility] directly at [phone].  Normal or non-critical lab and imaging results will be communicated to you by MyChart, letter or phone within 4 business days after the clinic has received the results. If you do not hear from us within 7 days, please contact the clinic through EyeIChart or phone. If you have a critical or abnormal lab result, we will notify you by phone as soon as possible.  Submit refill requests through [employer] or call your pharmacy and they will forward the refill request to us. Please allow 3 business days for your refill to be completed.          Additional Information About Your Visit        EyeI[employer]One Information     [employer] lets you send messages to your doctor, view your test results, renew your prescriptions, schedule appointments and more. To sign up, go to [email or web address] . Click on \"Log in\" on the left side of the screen, which will take you to the Welcome page. Then click on \"Sign up Now\" on the right side of the page.     You will be asked to enter the access code listed below, as well as some personal information. Please follow the directions to create your username and password.     Your access code is: [email or web address]  Expires: 3/1/2018  4:25 PM     Your access code will  in 90 days. If you need help or a new code, please call your [facility] clinic or [phone].        Care EveryWhere ID     This is your Care EveryWhere ID. This could be used by other organizations to access your [facility] medical records  [id number]        Your Vitals Were     Pulse Temperature Last Period Pulse Oximetry BMI (Body Mass Index)       87 98.1  F (36.7  C) (Oral) 2017 98% 25.34 kg/m2        Blood Pressure from Last 3 Encounters:   17 120/76   10/30/17 122/76   10/27/17 122/60    " Weight from Last 3 Encounters:   12/01/17 63.9 kg (140 lb 12.8 oz)   10/30/17 65.1 kg (143 lb 9.6 oz)   10/27/17 64.8 kg (142 lb 14.4 oz)              We Performed the Following     Ketones urine          Today's Medication Changes          These changes are accurate as of: 12/1/17  5:34 PM.  If you have any questions, ask your nurse or doctor.               Start taking these medicines.        Dose/Directions    ondansetron 4 MG tablet   Commonly known as:  ZOFRAN   Used for:  Hyperemesis gravidarum   Started by:  Wen Laurent,         Dose:  4 mg   Take 1 tablet (4 mg) by mouth every 6 hours as needed for nausea   Quantity:  30 tablet   Refills:  1            Where to get your medicines      These medications were sent to [facility] - [place], [place] - [address]  [address], [facility] [address]     Phone:  [phone]     ondansetron 4 MG tablet                Primary Care Provider Office Phone # Fax #    [facility] [phone]       [address]  [facility] [address]        Equal Access to Services     ALFONSO SHERMAN : Hadii wiliam ku hadasho Soomaali, waaxda luqadaha, qaybta kaalmada adeegyada, waxay andreein hayveronican memo weaver. So [facility] [phone].    ATENCIÓN: Si habla español, tiene a dahl disposición servicios gratuitos de asistencia lingüística. Llame al [phone].    We comply with applicable federal civil rights laws and [place] laws. We do not discriminate on the basis of race, color, national origin, age, disability, sex, sexual orientation, or gender identity.            Thank you!     Thank you for choosing [facility]  for your care. Our goal is always to provide you with excellent care. Hearing back from our patients is one way we can continue to improve our services. Please take a few minutes to complete the written survey that you may receive in the mail after  your visit with us. Thank you!             Your Updated Medication List - Protect others around you: Learn how to safely use, store and throw away your medicines at www.disposemymeds.org.          This list is accurate as of: 12/1/17  5:34 PM.  Always use your most recent med list.                   Brand Name Dispense Instructions for use Diagnosis    fluticasone 50 MCG/ACT spray    FLONASE    1 Bottle    Spray 1 spray into both nostrils 2 times daily    Chronic allergic rhinitis due to pollen, unspecified seasonality       ondansetron 4 MG tablet    ZOFRAN    30 tablet    Take 1 tablet (4 mg) by mouth every 6 hours as needed for nausea    Hyperemesis gravidarum       prenatal multivitamin plus iron 27-0.8 MG Tabs per tablet     100 tablet    Take 1 tablet by mouth daily    Family planning counseling

## 2017-12-01 NOTE — PATIENT INSTRUCTIONS
If you have any questions regarding your visit, Please contact your care team.    Women s Health CLINIC HOURS TELEPHONE NUMBER   Wen DO Anastasiia.    AVELINA Hoover -    KENNETH Ngo RN       Monday, Wednesday, Thursday and Friday, [place]  8:30a.m-5:00 p.m   [facility]  [address] [place] [address]  [phone] ask for [facility]    Imaging [phone]       Urgent Care locations:    [facility] Saturday and Sunday   9 am - 5 pm    Monday-Friday   12 pm - 8 pm  Saturday and Sunday   9 am - 5 pm   [phone]     [facility] Labor and Delivery:  [phone]    If you need a medication refill, please contact your pharmacy. Please allow 3 business days for your refill to be completed.  As always, Thank you for trusting us with your healthcare needs!

## 2017-12-01 NOTE — PROGRESS NOTES
She is very nervous about this pregnancy and states that beginning on 11/13/17 she experienced vaginal bleeding x about 2 weeks.  Therefore, will schedule an OB US for f/u although the fhts per doppler today are a normal rate.  She c/o daily nausea and emesis so would like treatment for this.  We discussed options and she would like to try Zofran so instructions were reviewed and her  was present throughout today's visit.  Will check a urine for ketones to determine if dehydrated currently.  She already received her flu vaccine.

## 2017-12-01 NOTE — NURSING NOTE
"Chief Complaint   Patient presents with     Prenatal Care     10w4d       Initial /76  Pulse 87  Temp 98.1  F (36.7  C) (Oral)  Wt 63.9 kg (140 lb 12.8 oz)  LMP 09/18/2017  SpO2 98%  BMI 25.34 kg/m2 Estimated body mass index is 25.34 kg/(m^2) as calculated from the following:    Height as of 10/30/17: 1.588 m (5' 2.5\").    Weight as of this encounter: 63.9 kg (140 lb 12.8 oz).  Medication Reconciliation: complete   Sussy Lopez, CMA      "

## 2017-12-02 NOTE — TELEPHONE ENCOUNTER
I called and let the patient know that her urine test was negative for ketones so she is not currently dehydrated.

## 2017-12-05 ENCOUNTER — RADIANT APPOINTMENT (OUTPATIENT)
Dept: ULTRASOUND IMAGING | Facility: CLINIC | Age: 32
End: 2017-12-05
Attending: OBSTETRICS & GYNECOLOGY
Payer: COMMERCIAL

## 2017-12-05 DIAGNOSIS — O20.9 HEMORRHAGE IN EARLY PREGNANCY, ANTEPARTUM: ICD-10-CM

## 2017-12-05 PROCEDURE — 76801 OB US < 14 WKS SINGLE FETUS: CPT | Performed by: RADIOLOGY

## 2017-12-14 ENCOUNTER — TELEPHONE (OUTPATIENT)
Dept: OBGYN | Facility: CLINIC | Age: 32
End: 2017-12-14

## 2017-12-14 NOTE — TELEPHONE ENCOUNTER
[facility] Call Center    Phone Message    Name of Caller: eJan    Phone Number: Home number on file [phone]    Best time to return call: any    May a detailed message be left on voicemail: yes    Relation to patient: Other Name: jean   Relationship: .  Is there legal documentation in chart to discuss information with this person: Yes. Legal Documentation is verified by ..      Reason for Call: Medication Question or concern regarding medication   Jean is calling on behalf of the patient who is pregnant.  He wants to know about her taking certain medications since she is pregant- are they okay to take?  He had questions about different medications and if it is okay to take     Is patient symptomatic? No. Describe question or concern: .         Action Taken: Message routed to:  [facility] p [phone]

## 2017-12-15 NOTE — TELEPHONE ENCOUNTER
Noted Authorization to Discuss Protected Health Information with , Jean, signed on 12-01-17 and on file.     This writer attempted to contact  on 12/15/17      Reason for call returning his call and left message to return call.      If patient calls back:   Patient contacted by clinic RN team. Inform patient that someone from the team will contact them, document that pt called and route to care team. .      Maru Restrepo RN

## 2017-12-15 NOTE — TELEPHONE ENCOUNTER
"Return call from . He wanted to verify if patient has to take Rx PNV of if she can another OTC PNV that he bought at pharmacy. Explained that OTC PNV are safe.  also wonder if patient can take a stool softener. Gave advise per [place] handout Taking Medicine during Pregnancy.  verbalized understanding and agreed to follow plan.  reported patient slipped on the bathroom floor today as it was wet but was able to catch herself by placing her hand on the ground. Patient did not fall, no LOC, did not hit anything, no lacerations, or any other sxs.  wondered if the \"slip\" harmed baby. Reassured  and appreciative of assistance. Maru Restrepo RN, BAN    "

## 2018-01-03 ENCOUNTER — TELEPHONE (OUTPATIENT)
Dept: OBGYN | Facility: CLINIC | Age: 33
End: 2018-01-03

## 2018-01-03 NOTE — TELEPHONE ENCOUNTER
"[facility] Call Center    Phone Message    Name of Caller: Jean    Phone Number:  [phone]           Best time to return call: any    May a detailed message be left on voicemail: yes    Relation to patient: Other Name: Jean Villavicencio  Relationship:   Is there legal documentation in chart to discuss information with this person: Yes. Legal Documentation is verified by ..      Reason for Call: Symptoms or Concerns     Does patient have any of the following \"red flag\" symptoms: None    Does patient have any \"Red Flag\" symptoms? No.     Current symptom or concern: patient has been experiencing cold/flu like symptoms for a couple of days- sore throat, sneezing, coughing up phlem.  Please advise    Symptoms have been present for:  2 day(s)    Has patient previously been seen for this? No        Are there any new or worsening symptoms? No      Action Taken: Message routed to:  [facility] p [phone]  "

## 2018-01-03 NOTE — TELEPHONE ENCOUNTER
Lou Rivers is a 32 year old female who calls with sore throat, sneezing, cough and pregnant.    NURSING ASSESSMENT:  Description:  Has a cough and a sore throat for a few days. Dry cough with a headache and thick nasal drainage. Having nasal congestion. Stated her headache is severe 8/10, comes and goes. Stated will try Tylenol. Denies fevers, chills, sweats, difficulty breathing.  Onset/duration:  2-3 days   Precip. factors:  none  Associated symptoms:  Cough, nasal congestion, sore throat and runny nose  Improves/worsens symptoms:  same  Pain scale (0-10)   8/10 headache  LMP/preg/breast feeding:  Patient's last menstrual period was 09/18/2017.  GA: 15w2d  RYAN: Estimated Date of Delivery: Jun 25, 2018  Last exam/Treatment:  12/01/2017  Allergies:   Allergies   Allergen Reactions     Dust Mites      House dust     NURSING PLAN: Nursing advice to patient to be evaluated for severe headache during pregnancy. declined stated would try home care measures for headache, cough and sore throat. if not improving should be seen in clinic    RECOMMENDED DISPOSITION:  See in 24 hours - due to being pregnant and headache - declined stated would try home care measuers  Will comply with recommendation: No- Barriers to comply with plan of care will try home care measures.  If further questions/concerns or if symptoms do not improve, worsen or new symptoms develop, call your PCP or [facility] as soon as possible.    NOTES:  Disposition was determined by the first positive assessment question, therefore all previous assessment questions were negative    Guideline used:  Telephone Triage for Obstetrics and Gynecology, Елена Reece and Ana Horner  2nd trimester headache  Telephone Triage Protocols for Nurses, Fifth Edition, Caridad Prasad  Cough  Nursing Judgment    Nicole Fishman, RN, BSN

## 2018-01-05 ENCOUNTER — PRENATAL OFFICE VISIT (OUTPATIENT)
Dept: OBGYN | Facility: CLINIC | Age: 33
End: 2018-01-05
Payer: COMMERCIAL

## 2018-01-05 VITALS
DIASTOLIC BLOOD PRESSURE: 72 MMHG | BODY MASS INDEX: 25.27 KG/M2 | TEMPERATURE: 98.3 F | WEIGHT: 140.4 LBS | SYSTOLIC BLOOD PRESSURE: 124 MMHG | HEART RATE: 95 BPM

## 2018-01-05 DIAGNOSIS — J06.9 VIRAL UPPER RESPIRATORY TRACT INFECTION: ICD-10-CM

## 2018-01-05 DIAGNOSIS — Z34.82 ENCOUNTER FOR SUPERVISION OF OTHER NORMAL PREGNANCY IN SECOND TRIMESTER: Primary | ICD-10-CM

## 2018-01-05 PROBLEM — Z34.80 SUPERVISION OF OTHER NORMAL PREGNANCY, ANTEPARTUM: Status: ACTIVE | Noted: 2018-01-05

## 2018-01-05 PROCEDURE — 99207 ZZC PRENATAL VISIT: CPT | Performed by: OBSTETRICS & GYNECOLOGY

## 2018-01-05 PROCEDURE — 36415 COLL VENOUS BLD VENIPUNCTURE: CPT | Performed by: OBSTETRICS & GYNECOLOGY

## 2018-01-05 PROCEDURE — 81511 FTL CGEN ABNOR FOUR ANAL: CPT | Mod: 90 | Performed by: OBSTETRICS & GYNECOLOGY

## 2018-01-05 PROCEDURE — 99000 SPECIMEN HANDLING OFFICE-LAB: CPT | Performed by: OBSTETRICS & GYNECOLOGY

## 2018-01-05 NOTE — NURSING NOTE
"Chief Complaint   Patient presents with     Prenatal Care       Initial /72 (BP Location: Right arm, Patient Position: Chair, Cuff Size: Adult Regular)  Pulse 95  Temp 98.3  F (36.8  C) (Tympanic)  Wt 63.7 kg (140 lb 6.4 oz)  LMP 09/18/2017  BMI 25.27 kg/m2 Estimated body mass index is 25.27 kg/(m^2) as calculated from the following:    Height as of 10/30/17: 1.588 m (5' 2.5\").    Weight as of this encounter: 63.7 kg (140 lb 6.4 oz).  Medication Reconciliation: complete     Ann Perales, [facility]  January 5, 2018      "

## 2018-01-05 NOTE — MR AVS SNAPSHOT
After Visit Summary   1/5/2018    Lou Rivers    MRN: [id number]           Patient Information     Date Of Birth          1985        Visit Information        Provider Department      1/5/2018 3:45 PM Sneha Guajardo MD [facility] [place] [place]        Today's Diagnoses     Encounter for supervision of other normal pregnancy in second trimester    -  1    Viral upper respiratory tract infection           Follow-ups after your visit        Follow-up notes from your care team     Return in about 4 weeks (around 2/2/2018), or if symptoms worsen or fail to improve.      Your next 10 appointments already scheduled     Jan 09, 2018  5:10 PM CST   (Arrive by 4:55 PM)   US OB > 14 WEEKS COMPLETE SINGLE with MGGYPSY MG  [place]   [facility]    [address] [place] [id number] [phone]           Please bring a list of your medicines (including vitamins, minerals and over-the-counter drugs). Also, tell your doctor about any allergies you may have. Wear comfortable clothes and leave your valuables at home.  If you re less than 20 weeks drink four 8-ounce glasses of fluid an hour before your exam. If you need to empty your bladder before your exam, try to release only a little urine. Then, drink another glass of fluid.  You may have up to two family members in the exam room. If you bring a small child, an adult must be there to care for him or her.  Please call the Imaging Department at your exam site with any questions.              Future tests that were ordered for you today     Open Future Orders        Priority Expected Expires Ordered    US OB > 14 Weeks Complete Single Routine  1/5/2019 1/5/2018            Who to contact     If you have questions or need follow up information about today's clinic visit or your schedule please contact [facility] directly at [phone].  Normal or non-critical lab and imaging  results will be communicated to you by MyChart, letter or phone within 4 business days after the clinic has received the results. If you do not hear from us within 7 days, please contact the clinic through [employer] or phone. If you have a critical or abnormal lab result, we will notify you by phone as soon as possible.  Submit refill requests through [employer] or call your pharmacy and they will forward the refill request to us. Please allow 3 business days for your refill to be completed.          Additional Information About Your Visit        [employer] Information     [employer] gives you secure access to your electronic health record. If you see a primary care provider, you can also send messages to your care team and make appointments. If you have questions, please call your primary care clinic.  If you do not have a primary care provider, please call [phone] and they will assist you.        Care EveryWhere ID     This is your Care EveryWhere ID. This could be used by other organizations to access your [facility] medical records  [id number]        Your Vitals Were     Pulse Temperature Last Period BMI (Body Mass Index)          95 98.3  F (36.8  C) (Tympanic) 09/18/2017 25.27 kg/m2         Blood Pressure from Last 3 Encounters:   01/05/18 124/72   12/01/17 120/76   10/30/17 122/76    Weight from Last 3 Encounters:   01/05/18 140 lb 6.4 oz (63.7 kg)   12/01/17 140 lb 12.8 oz (63.9 kg)   10/30/17 143 lb 9.6 oz (65.1 kg)              We Performed the Following     Maternal quad screen        Primary Care Provider Office Phone # Fax #    [facility] [phone]       [address]  [facility] [address]        Equal Access to Services     TARIK SHERMAN AH: Hadii wiliam Gee, waedgardda luqadaha, qaybta kaalmada adeegyada, nicole weaver. So [facility] [phone].    ATENCIÓN: Si habla español, tiene a dahl disposición servicios gratuitos de asistencia  lingüística. Phi al [phone].    We comply with applicable federal civil rights laws and [place] laws. We do not discriminate on the basis of race, color, national origin, age, disability, sex, sexual orientation, or gender identity.            Thank you!     Thank you for choosing [facility]  for your care. Our goal is always to provide you with excellent care. Hearing back from our patients is one way we can continue to improve our services. Please take a few minutes to complete the written survey that you may receive in the mail after your visit with us. Thank you!             Your Updated Medication List - Protect others around you: Learn how to safely use, store and throw away your medicines at www.disposemymeds.org.          This list is accurate as of: 1/5/18  4:20 PM.  Always use your most recent med list.                   Brand Name Dispense Instructions for use Diagnosis    COLACE PO      Take 100 mg by mouth        fluticasone 50 MCG/ACT spray    FLONASE    1 Bottle    Spray 1 spray into both nostrils 2 times daily    Chronic allergic rhinitis due to pollen, unspecified seasonality       ondansetron 4 MG tablet    ZOFRAN    30 tablet    Take 1 tablet (4 mg) by mouth every 6 hours as needed for nausea    Hyperemesis gravidarum       prenatal multivitamin plus iron 27-0.8 MG Tabs per tablet     100 tablet    Take 1 tablet by mouth daily    Family planning counseling       TIGER BALM EX           TYLENOL PO

## 2018-01-05 NOTE — PROGRESS NOTES
Presents for routine  appointment.    Patient had some cold symptoms earlier this week.   She has had the symptoms for about a week and feels like it is getting worse.  Headache.  Cough.  Congestion.  Chest is ok.  Pressure in her sinuses. Throat pain.  She has only used tylenol.  She has not had fevers.    She had some vomiting.     No LOF/VB/Ctxs.    ROS:   and GI  negative.     Please see Prenatal Vitals and Notes Flowsheet for objective data.  /72 (BP Location: Right arm, Patient Position: Chair, Cuff Size: Adult Regular)  Pulse 95  Temp 98.3  F (36.8  C) (Tympanic)  Wt 140 lb 6.4 oz (63.7 kg)  LMP 2017  BMI 25.27 kg/m2   Lungs CTAB    A/P:  32 year old  at 15w4d       ICD-10-CM    1. Encounter for supervision of other normal pregnancy in second trimester Z34.82 Maternal quad screen     US OB > 14 Weeks Complete Single       She will try OTC medication for cold symptoms.  If her symptoms do not improve, she will call in Monday and may need to see Family Medicine.    Genetic screening - quad screen  20 week ultrasound ordered  Follow up in 4  Week(s), sooner as needed      Sneha Guajardo MD

## 2018-01-08 ENCOUNTER — OFFICE VISIT (OUTPATIENT)
Dept: FAMILY MEDICINE | Facility: CLINIC | Age: 33
End: 2018-01-08
Payer: COMMERCIAL

## 2018-01-08 VITALS
OXYGEN SATURATION: 97 % | BODY MASS INDEX: 25.05 KG/M2 | WEIGHT: 139.2 LBS | TEMPERATURE: 98.5 F | DIASTOLIC BLOOD PRESSURE: 60 MMHG | SYSTOLIC BLOOD PRESSURE: 114 MMHG | HEART RATE: 102 BPM | RESPIRATION RATE: 18 BRPM

## 2018-01-08 DIAGNOSIS — H66.001 ACUTE SUPPURATIVE OTITIS MEDIA OF RIGHT EAR WITHOUT SPONTANEOUS RUPTURE OF TYMPANIC MEMBRANE, RECURRENCE NOT SPECIFIED: Primary | ICD-10-CM

## 2018-01-08 DIAGNOSIS — R09.81 CONGESTION OF PARANASAL SINUS: ICD-10-CM

## 2018-01-08 DIAGNOSIS — K21.9 GASTROESOPHAGEAL REFLUX DISEASE WITHOUT ESOPHAGITIS: ICD-10-CM

## 2018-01-08 DIAGNOSIS — Z33.1 PREGNANCY, INCIDENTAL: ICD-10-CM

## 2018-01-08 LAB
# FETUSES US: NORMAL
AFP ADJ MOM AMN: 0.88
AFP SERPL-MCNC: 28 NG/ML
AGE - REPORTED: 35 YR
DATING METHOD: NORMAL
DIABETIC AT CONCEPTION: NO
FAMILY MEMBER DISEASES HX: NO
FAMILY MEMBER DISEASES HX: NO
GA METHOD: NORMAL
GA: 15.57 WEEKS
HCG MOM SERPL: 1.93
HCG SERPL-ACNC: NORMAL IU/L
HX OF HEREDITARY DISORDERS: NO
IDDM PATIENT QL: NO
INHIBIN A MOM SERPL: 0.67
INHIBIN A SERPL-MCNC: 123 PG/ML
INTEGRATED SCN PATIENT-IMP: NORMAL
LMP START DATE: NORMAL
PATHOLOGY STUDY: NORMAL
PREV HX CHROMOSOME ABNORMALITY: NO
SPECIMEN DRAWN SERPL: NORMAL
TWINS: NO
U ESTRIOL MOM SERPL: 1.73
U ESTRIOL SERPL-MCNC: 1.37 NG/ML

## 2018-01-08 PROCEDURE — 99214 OFFICE O/P EST MOD 30 MIN: CPT | Performed by: FAMILY MEDICINE

## 2018-01-08 RX ORDER — AMOXICILLIN 875 MG
875 TABLET ORAL 2 TIMES DAILY
Qty: 20 TABLET | Refills: 0 | Status: SHIPPED | OUTPATIENT
Start: 2018-01-08 | End: 2018-02-02

## 2018-01-08 RX ORDER — GUAIFENESIN 600 MG/1
600 TABLET, EXTENDED RELEASE ORAL 2 TIMES DAILY PRN
Qty: 30 TABLET | Refills: 0 | Status: SHIPPED | OUTPATIENT
Start: 2018-01-08 | End: 2018-02-02

## 2018-01-08 ASSESSMENT — PAIN SCALES - GENERAL: PAINLEVEL: NO PAIN (0)

## 2018-01-08 NOTE — PROGRESS NOTES
SUBJECTIVE:   Lou Rivers is a 32 year old female who presents to clinic today for the following health issues:      Acute Illness   Acute illness concerns: cold  Onset: started Moise New year sammi     Fever: no     Chills/Sweats: YES    Headache (location?): YES    Sinus Pressure:YES    Conjunctivitis:  no    Ear Pain: YES- both    Rhinorrhea: YES    Congestion: YES    Sore Throat: YES     Cough: YES    Wheeze: YES    Decreased Appetite: YES    Nausea: YES    Vomiting: YES    Diarrhea:  no     Dysuria/Freq.: no     Fatigue/Achiness: YES    Sick/Strep Exposure: no      Therapies Tried and outcome: theraflu  Patient states that she has has a cold that started on New Year's Sammi when they got back from [place]. She describes feeling her symptoms being at their worst at night before bed. She has tried alleviating her symptoms with Mucinex, Theraflu, and Tylenol but has had little relief. Denies having any fever.   Patient states that she experiences pain when she is around loud noises or people talking very loudly. She reports the vibrations being painful. Her symptoms were exacerbated when she takes a flight.      Problem list and histories reviewed & adjusted, as indicated.  Additional history: as documented    BP Readings from Last 3 Encounters:   01/08/18 114/60   01/05/18 124/72   12/01/17 120/76    Wt Readings from Last 3 Encounters:   01/08/18 63.1 kg (139 lb 3.2 oz)   01/05/18 63.7 kg (140 lb 6.4 oz)   12/01/17 63.9 kg (140 lb 12.8 oz)            Pregnancy / GERD  Patient states that she is worried about the baby when she is sick or has a cold. She is having lots of stomach pain and vomiting quite a bit. Drinking coffee exacerbates her symptoms. She also experiences gastric acid reflux and heartburn occasionally.          Reviewed and updated as needed this visit by clinical staffTobacco  Allergies  Meds  Med Hx  Surg Hx  Fam Hx  Soc Hx      Reviewed and updated as needed this visit by Provider  Tobacco   Allergies  Meds  Med Hx  Surg Hx  Fam Hx  Soc Hx        ROS:  Constitutional, HEENT, cardiovascular, pulmonary, GI, , musculoskeletal, neuro, skin, endocrine and psych systems are negative, except as otherwise noted.    This document serves as a record of the services and decisions personally performed and made by Sara Fair MD. It was created on her behalf by KATHLEEN NAVA, a trained medical scribe. The creation of this document is based on the provider's statements to the medical scribe.  KATHLEEN NAVA 4:28 PM January 8, 2018    OBJECTIVE:   /60 (BP Location: Right arm, Patient Position: Sitting, Cuff Size: Adult Regular)  Pulse 102  Temp 98.5  F (36.9  C) (Oral)  Resp 18  Wt 63.1 kg (139 lb 3.2 oz)  LMP 09/18/2017  SpO2 97%  BMI 25.05 kg/m2  Body mass index is 25.05 kg/(m^2).  GENERAL: overweight, alert and no distress  HENT: normal cephalic/atraumatic, right ear: erythematous, left ear: clear effusion, nose and mouth without ulcers or lesions, nasal mucosa edematous , rhinorrhea yellow, green, thick and postnasal, oropharynx clear and oral mucous membranes moist  NECK: bilateral anterior cervical adenopathy, no asymmetry, masses, or scars and thyroid normal to palpation  RESP: lungs clear to auscultation - no rales, rhonchi or wheezes  CV: regular rate and rhythm, normal S1 S2, no S3 or S4, no murmur, click or rub, no peripheral edema and peripheral pulses strong    Diagnostic Test Results:  none     ASSESSMENT/PLAN:     1. Acute suppurative otitis media of right ear without spontaneous rupture of tympanic membrane, recurrence not specified  Start taking Amoxicillin twice a day.  - amoxicillin (AMOXIL) 875 MG tablet; Take 1 tablet (875 mg) by mouth 2 times daily  Dispense: 20 tablet; Refill: 0    2. Congestion of paranasal sinus  Mucinex 600 mg 1 tabs twice a day  - guaiFENesin (MUCINEX) 600 MG 12 hr tablet; Take 1 tablet (600 mg) by mouth 2 times daily as needed for congestion   Dispense: 30 tablet; Refill: 0    3. Gastroesophageal reflux disease without esophagitis  You can use Tums one pill as needed for heartburn or Zantac 150 mg twice a day as needed.  - ranitidine (ZANTAC) 150 MG tablet; Take 1 tablet (150 mg) by mouth 2 times daily  Dispense: 60 tablet; Refill: 1    Patient Instructions     NON PRESCRIPTION TREATMENT UPPER RESPIRATORY INFECTION    Mucinex 600 mg 1 tabs twice a day  Increase humidity to 30-40% in bedroom at night - vaporizer  Avoid decongestant  Saline nasal spray as needed  Increase fluid intake  Benadryl 25mg 1/2 - 1 hour before bed time  Maintain 8 hr minimum of sleep at night  Return to clinic if no improvement or worsening symptoms after 7-10 days    Start taking Amoxicillin twice a day.    You can use Tums one pill as needed for heartburn or Zantac 150 mg twice a day as needed.    Prescription for Zantac is sent to pharmacy is desired to use medication for heartburn.      The information in this document, created by the medical scribe for me, accurately reflects the services I personally performed and the decisions made by me. I have reviewed and approved this document for accuracy prior to leaving the patient care area.  January 8, 2018 4:51 PM    Sara Fair MD  [facility]

## 2018-01-08 NOTE — PATIENT INSTRUCTIONS
NON PRESCRIPTION TREATMENT UPPER RESPIRATORY INFECTION    Mucinex 600 mg 1 tabs twice a day  Increase humidity to 30-40% in bedroom at night - vaporizer  Avoid decongestant  Saline nasal spray as needed  Increase fluid intake  Benadryl 25mg 1/2 - 1 hour before bed time  Maintain 8 hr minimum of sleep at night  Return to clinic if no improvement or worsening symptoms after 7-10 days    Start taking Amoxicillin twice a day.    You can use Tums one pill as needed for heartburn or Zantac 150 mg twice a day as needed.    Prescription for Zantac is sent to pharmacy is desired to use medication for heartburn.

## 2018-01-08 NOTE — NURSING NOTE
"Chief Complaint   Patient presents with     URI       Initial /60 (BP Location: Right arm, Patient Position: Sitting, Cuff Size: Adult Regular)  Pulse 102  Temp 98.5  F (36.9  C) (Oral)  Resp 18  Wt 63.1 kg (139 lb 3.2 oz)  LMP 09/18/2017  SpO2 97%  BMI 25.05 kg/m2 Estimated body mass index is 25.05 kg/(m^2) as calculated from the following:    Height as of 10/30/17: 1.588 m (5' 2.5\").    Weight as of this encounter: 63.1 kg (139 lb 3.2 oz).  Medication Reconciliation: complete     Radha Hall      "

## 2018-01-08 NOTE — MR AVS SNAPSHOT
After Visit Summary   1/8/2018    Lou Rivers    MRN: [id number]           Patient Information     Date Of Birth          1985        Visit Information        Provider Department      1/8/2018 4:20 PM Sara Fair MD [facility]        Today's Diagnoses     Acute suppurative otitis media of right ear without spontaneous rupture of tympanic membrane, recurrence not specified    -  1    Congestion of paranasal sinus        Gastroesophageal reflux disease without esophagitis          Care Instructions      NON PRESCRIPTION TREATMENT UPPER RESPIRATORY INFECTION    Mucinex 600 mg 1 tabs twice a day  Increase humidity to 30-40% in bedroom at night - vaporizer  Avoid decongestant  Saline nasal spray as needed  Increase fluid intake  Benadryl 25mg 1/2 - 1 hour before bed time  Maintain 8 hr minimum of sleep at night  Return to clinic if no improvement or worsening symptoms after 7-10 days    Start taking Amoxicillin twice a day.    You can use Tums one pill as needed for heartburn or Zantac 150 mg twice a day as needed.    Prescription for Zantac is sent to pharmacy is desired to use medication for heartburn.              Follow-ups after your visit        Your next 10 appointments already scheduled     Jan 09, 2018  5:10 PM CST   (Arrive by 4:55 PM)   US OB > 14 WEEKS COMPLETE SINGLE with MGUS1, MG  StackAdapt   [facility]    [address] [place] [address] [phone]           Please bring a list of your medicines (including vitamins, minerals and over-the-counter drugs). Also, tell your doctor about any allergies you may have. Wear comfortable clothes and leave your valuables at home.  If you re less than 20 weeks drink four 8-ounce glasses of fluid an hour before your exam. If you need to empty your bladder before your exam, try to release only a little urine. Then, drink another glass of fluid.  You may have up to  two family members in the exam room. If you bring a small child, an adult must be there to care for him or her.  Please call the Imaging Department at your exam site with any questions.            Feb 02, 2018  4:45 PM CST   ESTABLISHED PRENATAL with Wen Laurent, DO   [facility]    [address] [place] [address] [phone]              Who to contact     If you have questions or need follow up information about today's clinic visit or your schedule please contact [facility] directly at [phone].  Normal or non-critical lab and imaging results will be communicated to you by MyChart, letter or phone within 4 business days after the clinic has received the results. If you do not hear from us within 7 days, please contact the clinic through C[employer]a Software[employer] or phone. If you have a critical or abnormal lab result, we will notify you by phone as soon as possible.  Submit refill requests through [employer] or call your pharmacy and they will forward the refill request to us. Please allow 3 business days for your refill to be completed.          Additional Information About Your Visit        C[employer]a Software[employer]rbful Information     [employer] gives you secure access to your electronic health record. If you see a primary care provider, you can also send messages to your care team and make appointments. If you have questions, please call your primary care clinic.  If you do not have a primary care provider, please call [phone] and they will assist you.        Care EveryWhere ID     This is your Care EveryWhere ID. This could be used by other organizations to access your [place] medical records  [id number]        Your Vitals Were     Pulse Temperature Respirations Last Period Pulse Oximetry BMI (Body Mass Index)    102 98.5  F (36.9  C) (Oral) 18 09/18/2017 97% 25.05 kg/m2       Blood Pressure from Last 3 Encounters:   01/08/18 114/60   01/05/18 124/72    12/01/17 120/76    Weight from Last 3 Encounters:   01/08/18 63.1 kg (139 lb 3.2 oz)   01/05/18 63.7 kg (140 lb 6.4 oz)   12/01/17 63.9 kg (140 lb 12.8 oz)              Today, you had the following     No orders found for display         Today's Medication Changes          These changes are accurate as of: 1/8/18  4:49 PM.  If you have any questions, ask your nurse or doctor.               Start taking these medicines.        Dose/Directions    amoxicillin 875 MG tablet   Commonly known as:  AMOXIL   Used for:  Acute suppurative otitis media of right ear without spontaneous rupture of tympanic membrane, recurrence not specified   Started by:  Sara Fair MD        Dose:  875 mg   Take 1 tablet (875 mg) by mouth 2 times daily   Quantity:  20 tablet   Refills:  0       guaiFENesin 600 MG 12 hr tablet   Commonly known as:  MUCINEX   Used for:  Congestion of paranasal sinus   Started by:  Sara Fair MD        Dose:  600 mg   Take 1 tablet (600 mg) by mouth 2 times daily as needed for congestion   Quantity:  30 tablet   Refills:  0       ranitidine 150 MG tablet   Commonly known as:  ZANTAC   Used for:  Gastroesophageal reflux disease without esophagitis   Started by:  Sara Fair MD        Dose:  150 mg   Take 1 tablet (150 mg) by mouth 2 times daily   Quantity:  60 tablet   Refills:  1            Where to get your medicines      These medications were sent to [facility] [place] [address]  [address], [facility] [address]     Phone:  [phone]     amoxicillin 875 MG tablet    guaiFENesin 600 MG 12 hr tablet    ranitidine 150 MG tablet                Primary Care Provider Office Phone # Fax #    [facility] [phone]       [address]  [facility] [address]        Equal Access to Services     ALFONSO SHERMNA AH: Adiel Gee, yarelis tejada, fabien jama,  nicole ochoacourtney kemp'aan ah. So [facility] [place] [phone].    ATENCIÓN: Si giovanala adriana, tiene a dahl disposición servicios gratuitos de asistencia lingüística. Phi riley [phone].    We comply with applicable federal civil rights laws and [place] laws. We do not discriminate on the basis of race, color, national origin, age, disability, sex, sexual orientation, or gender identity.            Thank you!     Thank you for choosing [facility]  for your care. Our goal is always to provide you with excellent care. Hearing back from our patients is one way we can continue to improve our services. Please take a few minutes to complete the written survey that you may receive in the mail after your visit with us. Thank you!             Your Updated Medication List - Protect others around you: Learn how to safely use, store and throw away your medicines at www.disposemymeds.org.          This list is accurate as of: 1/8/18  4:49 PM.  Always use your most recent med list.                   Brand Name Dispense Instructions for use Diagnosis    amoxicillin 875 MG tablet    AMOXIL    20 tablet    Take 1 tablet (875 mg) by mouth 2 times daily    Acute suppurative otitis media of right ear without spontaneous rupture of tympanic membrane, recurrence not specified       COLACE PO      Take 100 mg by mouth        dextromethorphan-guaiFENesin  MG per 12 hr tablet    MUCINEX DM     Take 1 tablet by mouth every 12 hours        fluticasone 50 MCG/ACT spray    FLONASE    1 Bottle    Spray 1 spray into both nostrils 2 times daily    Chronic allergic rhinitis due to pollen, unspecified seasonality       guaiFENesin 600 MG 12 hr tablet    MUCINEX    30 tablet    Take 1 tablet (600 mg) by mouth 2 times daily as needed for congestion    Congestion of paranasal sinus       ondansetron 4 MG tablet    ZOFRAN    30 tablet    Take 1 tablet (4 mg) by mouth every 6 hours as needed for nausea    Hyperemesis gravidarum        prenatal multivitamin plus iron 27-0.8 MG Tabs per tablet     100 tablet    Take 1 tablet by mouth daily    Family planning counseling       ranitidine 150 MG tablet    ZANTAC    60 tablet    Take 1 tablet (150 mg) by mouth 2 times daily    Gastroesophageal reflux disease without esophagitis       THERAFLU COLD & COUGH PO           TIGER BALM EX           TYLENOL PO

## 2018-02-02 ENCOUNTER — RADIANT APPOINTMENT (OUTPATIENT)
Dept: ULTRASOUND IMAGING | Facility: CLINIC | Age: 33
End: 2018-02-02
Attending: OBSTETRICS & GYNECOLOGY
Payer: COMMERCIAL

## 2018-02-02 ENCOUNTER — PRENATAL OFFICE VISIT (OUTPATIENT)
Dept: OBGYN | Facility: CLINIC | Age: 33
End: 2018-02-02
Payer: COMMERCIAL

## 2018-02-02 VITALS
HEART RATE: 90 BPM | SYSTOLIC BLOOD PRESSURE: 110 MMHG | OXYGEN SATURATION: 97 % | DIASTOLIC BLOOD PRESSURE: 67 MMHG | WEIGHT: 140.7 LBS | BODY MASS INDEX: 25.32 KG/M2

## 2018-02-02 DIAGNOSIS — Z34.82 ENCOUNTER FOR SUPERVISION OF OTHER NORMAL PREGNANCY IN SECOND TRIMESTER: ICD-10-CM

## 2018-02-02 DIAGNOSIS — Z34.82 ENCOUNTER FOR SUPERVISION OF OTHER NORMAL PREGNANCY, SECOND TRIMESTER: Primary | ICD-10-CM

## 2018-02-02 PROCEDURE — 99207 ZZC PRENATAL VISIT: CPT | Performed by: OBSTETRICS & GYNECOLOGY

## 2018-02-02 PROCEDURE — 76805 OB US >/= 14 WKS SNGL FETUS: CPT | Performed by: STUDENT IN AN ORGANIZED HEALTH CARE EDUCATION/TRAINING PROGRAM

## 2018-02-02 NOTE — PROGRESS NOTES
She reports feeling reassuring daily fetal activity and will continue to record.  She gained 5 oz since her last visit and denies any fluid leakage or regular uterine contractions.  The results of today's US were reviewed with the pt and they are excited to have a girl!  Her MQS results were normal.  She feels that her cold symptoms have resolved and she no longer has congestion, sinus pressure, or a sore throat.  She has questions about her diet and PNV and these were all addressed.

## 2018-02-02 NOTE — MR AVS SNAPSHOT
After Visit Summary   2/2/2018    Lou Rivers    MRN: [id number]           Patient Information     Date Of Birth          1985        Visit Information        Provider Department      2/2/2018 4:45 PM Wen Laurent DO [facility]        Care Instructions                                                         If you have any questions regarding your visit, Please contact your care team.    Women s Health CLINIC HOURS TELEPHONE NUMBER   Wen DO Anastasiia.    AVELINA Hoover -    KENNETH Ngo RN       Monday, Wednesday, [facility]  8:30a.m-5:00 p.m   [facility]  [address] [place] [address] [phone] ask for [facility]    Imaging [id number]-[phone]       Urgent Care locations:    [facility] Saturday and Sunday   9 am - 5 pm    Monday-Friday   12 pm - 8 pm  Saturday and Sunday   9 am - 5 pm   [phone]     [facility] Labor and Delivery:  [phone]    If you need a medication refill, please contact your pharmacy. Please allow 3 business days for your refill to be completed.  As always, Thank you for trusting us with your healthcare needs!                Follow-ups after your visit        Your next 10 appointments already scheduled     Feb 28, 2018  4:30 PM CST   ESTABLISHED PRENATAL with Wen Laurent DO   [facility] ([facility]    [address]  [facility] [address] [phone]              Who to contact     If you have questions or need follow up information about today's clinic visit or your schedule please contact [facility] directly at [phone].  Normal or non-critical lab and imaging results will be communicated to you by MyChart, letter or phone within 4 business days after the clinic has received the results. If you do not hear from  us within 7 days, please contact the clinic through [employer] or phone. If you have a critical or abnormal lab result, we will notify you by phone as soon as possible.  Submit refill requests through [employer] or call your pharmacy and they will forward the refill request to us. Please allow 3 business days for your refill to be completed.          Additional Information About Your Visit        [employer] Information     [employer] gives you secure access to your electronic health record. If you see a primary care provider, you can also send messages to your care team and make appointments. If you have questions, please call your primary care clinic.  If you do not have a primary care provider, please call [phone] and they will assist you.        Care EveryWhere ID     This is your Care EveryWhere ID. This could be used by other organizations to access your [place] medical records  [id number]        Your Vitals Were     Pulse Last Period Pulse Oximetry Breastfeeding? BMI (Body Mass Index)       90 09/18/2017 97% No 25.32 kg/m2        Blood Pressure from Last 3 Encounters:   02/02/18 110/67   01/08/18 114/60   01/05/18 124/72    Weight from Last 3 Encounters:   02/02/18 140 lb 11.2 oz (63.8 kg)   01/08/18 139 lb 3.2 oz (63.1 kg)   01/05/18 140 lb 6.4 oz (63.7 kg)              Today, you had the following     No orders found for display         Today's Medication Changes          These changes are accurate as of 2/2/18  4:52 PM.  If you have any questions, ask your nurse or doctor.               Stop taking these medicines if you haven't already. Please contact your care team if you have questions.     amoxicillin 875 MG tablet   Commonly known as:  AMOXIL   Stopped by:  Wen Laurent,            dextromethorphan-guaiFENesin  MG per 12 hr tablet   Commonly known as:  MUCINEX DM   Stopped by:  Wen Laurent DO           guaiFENesin 600 MG 12 hr tablet   Commonly known as:  MUCINEX   Stopped by:   Wen Laurent DO           THERAFLU COLD & COUGH PO   Stopped by:  Wen Laurent DO                    Primary Care Provider Office Phone # Fax #    [place] [phone] [address]  [facility] [address]        Equal Access to Services     ALFONSO SHERMAN : Hadii aad ku hadasho Soomaali, waaxda luqadaha, qaybta kaalmada adeegyada, waxay idiin hayaan adeeg kharash la'aan . So [facility] [phone].    ATENCIÓN: Si habla español, tiene a dahl disposición servicios gratuitos de asistencia lingüística. Chaiame al [phone].    We comply with applicable federal civil rights laws and [place] laws. We do not discriminate on the basis of race, color, national origin, age, disability, sex, sexual orientation, or gender identity.            Thank you!     Thank you for choosing [facility]  for your care. Our goal is always to provide you with excellent care. Hearing back from our patients is one way we can continue to improve our services. Please take a few minutes to complete the written survey that you may receive in the mail after your visit with us. Thank you!             Your Updated Medication List - Protect others around you: Learn how to safely use, store and throw away your medicines at www.disposemymeds.org.          This list is accurate as of 2/2/18  4:52 PM.  Always use your most recent med list.                   Brand Name Dispense Instructions for use Diagnosis    COLACE PO      Take 100 mg by mouth        fluticasone 50 MCG/ACT spray    FLONASE    1 Bottle    Spray 1 spray into both nostrils 2 times daily    Chronic allergic rhinitis due to pollen, unspecified seasonality       ondansetron 4 MG tablet    ZOFRAN    30 tablet    Take 1 tablet (4 mg) by mouth every 6 hours as needed for nausea    Hyperemesis gravidarum       prenatal multivitamin plus iron 27-0.8 MG Tabs per tablet     100 tablet    Take 1 tablet by mouth daily     Family planning counseling       ranitidine 150 MG tablet    ZANTAC    60 tablet    Take 1 tablet (150 mg) by mouth 2 times daily    Gastroesophageal reflux disease without esophagitis       TIGER BALM EX           TYLENOL PO

## 2018-02-02 NOTE — PATIENT INSTRUCTIONS
If you have any questions regarding your visit, Please contact your care team.    Women s Health CLINIC HOURS TELEPHONE NUMBER   Wen DO Anastasiia.    AVELINA Hoover -    KENNETH Ngo RN       Monday, Wednesday, Thursday and Friday, [place]  8:30a.m-5:00 p.m   [facility]  [address] [place] [address]  [phone] ask for [facility] [place]    Imaging [phone]       Urgent Care locations:    [facility] Saturday and Sunday   9 am - 5 pm    Monday-Friday   12 pm - 8 pm  Saturday and Sunday   9 am - 5 pm   [phone]     [facility] Labor and Delivery:  [phone]    If you need a medication refill, please contact your pharmacy. Please allow 3 business days for your refill to be completed.  As always, Thank you for trusting us with your healthcare needs!

## 2018-02-07 ENCOUNTER — MYC REFILL (OUTPATIENT)
Dept: PEDIATRICS | Facility: CLINIC | Age: 33
End: 2018-02-07

## 2018-02-07 DIAGNOSIS — Z30.09 FAMILY PLANNING COUNSELING: ICD-10-CM

## 2018-02-08 RX ORDER — PRENATAL VIT/IRON FUM/FOLIC AC 27MG-0.8MG
1 TABLET ORAL DAILY
Qty: 100 TABLET | Refills: 3 | OUTPATIENT
Start: 2018-02-08

## 2018-02-08 NOTE — TELEPHONE ENCOUNTER
Prenatal Vit-Fe Fumarate-FA (PRENATAL MULTIVITAMIN PLUS IRON) 27-0.8 MG TABS per tablet 100 tablet 3 9/1/2017      Our records indicate that the patient has remaining refills at pharmacy. Sent [email or web address]me message to patient. Valarie Feliciano RN

## 2018-02-08 NOTE — TELEPHONE ENCOUNTER
Message from MyChart:  Original authorizing provider: LAINEY Jang CNP    Lou Rivers would like a refill of the following medications:  Prenatal Vit-Fe Fumarate-FA (PRENATAL MULTIVITAMIN PLUS IRON) 27-0.8 MG TABS per tablet [LAINEY Jang CNP]    Preferred pharmacy: [facility] [address]    Comment:

## 2018-02-28 ENCOUNTER — PRENATAL OFFICE VISIT (OUTPATIENT)
Dept: OBGYN | Facility: CLINIC | Age: 33
End: 2018-02-28
Payer: COMMERCIAL

## 2018-02-28 VITALS
BODY MASS INDEX: 25.54 KG/M2 | DIASTOLIC BLOOD PRESSURE: 67 MMHG | SYSTOLIC BLOOD PRESSURE: 123 MMHG | OXYGEN SATURATION: 95 % | WEIGHT: 141.9 LBS | HEART RATE: 96 BPM

## 2018-02-28 DIAGNOSIS — Z34.80 SUPERVISION OF OTHER NORMAL PREGNANCY, ANTEPARTUM: Primary | ICD-10-CM

## 2018-02-28 PROCEDURE — 99207 ZZC PRENATAL VISIT: CPT | Performed by: OBSTETRICS & GYNECOLOGY

## 2018-02-28 NOTE — MR AVS SNAPSHOT
After Visit Summary   2/28/2018    Lou Rivesr    MRN: [id number]           Patient Information     Date Of Birth          1985        Visit Information        Provider Department      2/28/2018 4:30 PM Wen Laurent DO [facility]        Today's Diagnoses     Supervision of other normal pregnancy, antepartum    -  1      Care Instructions                                                         If you have any questions regarding your visit, Please contact your care team.    [facility] CLINIC HOURS TELEPHONE NUMBER   Wen Laurent DO.    AVELINA Hoover -    KENNETH Ngo RN       [facility], Wednesday[facility]  8:30a.m-5:00 p.m   [facility]  [address]  [place], [place] [address] [phone] ask for [facility]    Imaging [phone]       Urgent Care locations:    [facility] Saturday and Sunday   9 am - 5 pm    Monday-Friday   12 pm - 8 pm  Saturday and Sunday   9 am - 5 pm   [phone]     [facility] Labor and Delivery:  [phone]    If you need a medication refill, please contact your pharmacy. Please allow 3 business days for your refill to be completed.  As always, Thank you for trusting us with your healthcare needs!                Follow-ups after your visit        Your next 10 appointments already scheduled     Mar 30, 2018  2:20 PM CDT   LAB with LAB FIRST FLOOR [facility] ([facility])    [address] [facility] [id number] [phone]           Please do not eat 10-12 hours before your appointment if you are coming in fasting for labs on lipids, cholesterol, or glucose (sugar). This does not apply to pregnant women. Water, hot tea and black coffee (with nothing added) are okay. Do not drink other fluids, diet soda or chew gum.            Mar 30, 2018   2:30 PM CDT   ESTABLISHED PRENATAL with Wen Daryl Laurent,    [facility]    [address] [place] [address] [phone]              Future tests that were ordered for you today     Open Future Orders        Priority Expected Expires Ordered    Glucose tolerance gest screen 1 hour Routine  4/13/2018 2/28/2018    OB hemoglobin Routine  4/13/2018 2/28/2018            Who to contact     If you have questions or need follow up information about today's clinic visit or your schedule please contact [facility] directly at [phone].  Normal or non-critical lab and imaging results will be communicated to you by [email or web address], letter or phone within 4 business days after the clinic has received the results. If you do not hear from us within 7 days, please contact the clinic through [employer]ph[employer] System[employer] or phone. If you have a critical or abnormal lab result, we will notify you by phone as soon as possible.  Submit refill requests through [employer] or call your pharmacy and they will forward the refill request to us. Please allow 3 business days for your refill to be completed.          Additional Information About Your Visit        AccessPay Information     [employer] gives you secure access to your electronic health record. If you see a primary care provider, you can also send messages to your care team and make appointments. If you have questions, please call your primary care clinic.  If you do not have a primary care provider, please call [phone] and they will assist you.        Care EveryWhere ID     This is your Care EveryWhere ID. This could be used by other organizations to access your [place] medical records  [id number]        Your Vitals Were     Pulse Last Period Pulse Oximetry Breastfeeding? BMI (Body Mass Index)       96 09/18/2017 95% No 25.54 kg/m2        Blood Pressure from Last 3 Encounters:   02/28/18 123/67   02/02/18 110/67    01/08/18 114/60    Weight from Last 3 Encounters:   02/28/18 141 lb 14.4 oz (64.4 kg)   02/02/18 140 lb 11.2 oz (63.8 kg)   01/08/18 139 lb 3.2 oz (63.1 kg)                 Today's Medication Changes          These changes are accurate as of 2/28/18  5:08 PM.  If you have any questions, ask your nurse or doctor.               Stop taking these medicines if you haven't already. Please contact your care team if you have questions.     COLACE PO   Stopped by:  Wen Laurent DO           ondansetron 4 MG tablet   Commonly known as:  ZOFRAN   Stopped by:  Wen Laurent DO                    Primary Care Provider Office Phone # Fax #    [facility] [phone] [address]  [facility] [address]        Equal Access to Services     ALFONSO SHERMAN : Hadii aad ku hadasho Soomaali, waaxda luqadaha, qaybta kaalmada adeegyada, waxay idiin hayaan memo romeroaralloyd vargas . So [facility] [phone].    ATENCIÓN: Si habla español, tiene a dahl disposición servicios gratuitos de asistencia lingüística. Chaiame al [phone].    We comply with applicable federal civil rights laws and [place] laws. We do not discriminate on the basis of race, color, national origin, age, disability, sex, sexual orientation, or gender identity.            Thank you!     Thank you for choosing [facility]  for your care. Our goal is always to provide you with excellent care. Hearing back from our patients is one way we can continue to improve our services. Please take a few minutes to complete the written survey that you may receive in the mail after your visit with us. Thank you!             Your Updated Medication List - Protect others around you: Learn how to safely use, store and throw away your medicines at www.disposemymeds.org.          This list is accurate as of 2/28/18  5:08 PM.  Always use your most recent med list.                   Brand Name Dispense Instructions  for use Diagnosis    fluticasone 50 MCG/ACT spray    FLONASE    1 Bottle    Spray 1 spray into both nostrils 2 times daily    Chronic allergic rhinitis due to pollen, unspecified seasonality       prenatal multivitamin plus iron 27-0.8 MG Tabs per tablet     100 tablet    Take 1 tablet by mouth daily    Family planning counseling       ranitidine 150 MG tablet    ZANTAC    60 tablet    Take 1 tablet (150 mg) by mouth 2 times daily    Gastroesophageal reflux disease without esophagitis       TIGER BALM EX           TYLENOL PO

## 2018-02-28 NOTE — PROGRESS NOTES
She reports feeling reassuring daily fetal activity and will continue to record.  She denies any fluid leakage or regular uterine contractions.  She gained 1 lb since her last visit.  She understands that her diabetic screen and hgb values will be checked at her next visit.

## 2018-02-28 NOTE — PATIENT INSTRUCTIONS
If you have any questions regarding your visit, Please contact your care team.    Women s Health CLINIC HOURS TELEPHONE NUMBER   Wen DO Anastasiia.    AVELINA Hoover -    KENNETH Ngo RN       Monday, Wednesday, Thursday and Friday, [place]  8:30a.m-5:00 p.m   [facility]  [address] [place] [address]  [phone] ask for [facility]    Imaging [phone]       Urgent Care locations:    [facility] Saturday and Sunday   9 am - 5 pm    Monday-Friday   12 pm - 8 pm  Saturday and Sunday   9 am - 5 pm   [phone]     [facility] Labor and Delivery:  [phone]    If you need a medication refill, please contact your pharmacy. Please allow 3 business days for your refill to be completed.  As always, Thank you for trusting us with your healthcare needs!

## 2018-03-06 ENCOUNTER — NURSE TRIAGE (OUTPATIENT)
Dept: NURSING | Facility: CLINIC | Age: 33
End: 2018-03-06

## 2018-03-06 NOTE — TELEPHONE ENCOUNTER
called without Pt .  24 weeks Pregnant followed by  with Wen Staples, DO OB/Gyn .  Nosebleeds for few days .  Tried x 2 but unable to contact Pt for triage . FNA advised  to have Pt call back directly for triage of her symptoms  And asked to have  check to see if she can self refer to ENT  and  agrees with both FNA  requests.   .Ana Toscano RN [place] nurse advisors.

## 2018-03-13 ENCOUNTER — OFFICE VISIT (OUTPATIENT)
Dept: FAMILY MEDICINE | Facility: CLINIC | Age: 33
End: 2018-03-13
Payer: COMMERCIAL

## 2018-03-13 VITALS
HEIGHT: 63 IN | HEART RATE: 97 BPM | BODY MASS INDEX: 25.87 KG/M2 | DIASTOLIC BLOOD PRESSURE: 60 MMHG | TEMPERATURE: 98.8 F | OXYGEN SATURATION: 97 % | WEIGHT: 146 LBS | RESPIRATION RATE: 16 BRPM | SYSTOLIC BLOOD PRESSURE: 110 MMHG

## 2018-03-13 DIAGNOSIS — R04.0 EPISTAXIS: Primary | ICD-10-CM

## 2018-03-13 DIAGNOSIS — Z33.1 PREGNANCY, INCIDENTAL: ICD-10-CM

## 2018-03-13 LAB
ANION GAP SERPL CALCULATED.3IONS-SCNC: 9 MMOL/L (ref 3–14)
BUN SERPL-MCNC: 7 MG/DL (ref 7–30)
CALCIUM SERPL-MCNC: 8.6 MG/DL (ref 8.5–10.1)
CHLORIDE SERPL-SCNC: 106 MMOL/L (ref 94–109)
CO2 SERPL-SCNC: 22 MMOL/L (ref 20–32)
CREAT SERPL-MCNC: 0.43 MG/DL (ref 0.52–1.04)
GFR SERPL CREATININE-BSD FRML MDRD: >90 ML/MIN/1.7M2
GLUCOSE SERPL-MCNC: 100 MG/DL (ref 70–99)
HGB BLD-MCNC: 12 G/DL (ref 11.7–15.7)
POTASSIUM SERPL-SCNC: 3.7 MMOL/L (ref 3.4–5.3)
SODIUM SERPL-SCNC: 137 MMOL/L (ref 133–144)

## 2018-03-13 PROCEDURE — 36415 COLL VENOUS BLD VENIPUNCTURE: CPT | Performed by: NURSE PRACTITIONER

## 2018-03-13 PROCEDURE — 80048 BASIC METABOLIC PNL TOTAL CA: CPT | Performed by: NURSE PRACTITIONER

## 2018-03-13 PROCEDURE — 85018 HEMOGLOBIN: CPT | Performed by: NURSE PRACTITIONER

## 2018-03-13 PROCEDURE — 99213 OFFICE O/P EST LOW 20 MIN: CPT | Performed by: NURSE PRACTITIONER

## 2018-03-13 NOTE — MR AVS SNAPSHOT
After Visit Summary   3/13/2018    Lou Rivers    MRN: [id number]           Patient Information     Date Of Birth          1985        Visit Information        Provider Department      3/13/2018 2:40 PM Elizabeth Yousif NP [facility]        Today's Diagnoses     Pregnancy, incidental    -  1    Epistaxis          Care Instructions    Count 10 baby movements in 1 hour  Lay on left side to count movements  Call OB if not 10 movements in 1 hour    Dizziness is common in pregnancy, if worsens call OB or PCP.   Make sure to eat something every few hours               Follow-ups after your visit        Your next 10 appointments already scheduled     Mar 30, 2018  2:20 PM CDT   LAB with LAB FIRST FLOOR [facility] ([facility])    [address] [place] [address] [id number] [phone]           Please do not eat 10-12 hours before your appointment if you are coming in fasting for labs on lipids, cholesterol, or glucose (sugar). This does not apply to pregnant women. Water, hot tea and black coffee (with nothing added) are okay. Do not drink other fluids, diet soda or chew gum.            Mar 30, 2018  2:30 PM CDT   ESTABLISHED PRENATAL with Wen Laurent,    [facility] ([facility])    [address] [place] [address] [id number] [phone]              Who to contact     If you have questions or need follow up information about today's clinic visit or your schedule please contact [facility] directly at [phone].  Normal or non-critical lab and imaging results will be communicated to you by MyChart, letter or phone within 4 business days after the clinic has received the results. If you do not hear from us within 7 days, please contact the clinic through MyChart or phone. If you have a critical or abnormal lab result, we will notify you by phone as soon as possible.  Submit  "refill requests through [employer] or call your pharmacy and they will forward the refill request to us. Please allow 3 business days for your refill to be completed.          Additional Information About Your Visit        [employer] Information     [employer] gives you secure access to your electronic health record. If you see a primary care provider, you can also send messages to your care team and make appointments. If you have questions, please call your primary care clinic.  If you do not have a primary care provider, please call [phone] and they will assist you.        Care EveryWhere ID     This is your Care EveryWhere ID. This could be used by other organizations to access your [place] medical records  [id number]        Your Vitals Were     Pulse Temperature Respirations Height Last Period Pulse Oximetry    97 98.8  F (37.1  C) (Oral) 16 1.607 m (5' 3.25\") 09/18/2017 97%    BMI (Body Mass Index)                   25.66 kg/m2            Blood Pressure from Last 3 Encounters:   03/13/18 110/60   02/28/18 123/67   02/02/18 110/67    Weight from Last 3 Encounters:   03/13/18 66.2 kg (146 lb)   02/28/18 64.4 kg (141 lb 14.4 oz)   02/02/18 63.8 kg (140 lb 11.2 oz)              We Performed the Following     Basic metabolic panel     Hemoglobin        Primary Care Provider Office Phone # Fax #    [facility] [phone]       [address]  [facility] [address]        Equal Access to Services     ALFONSO SHERMAN AH: Hadii aad ku hadasho Soomaali, waaxda luqadaha, qaybta kaalmada adeegyada, waxay idiin hayrojas vargas . So [facility] [phone].    ATENCIÓN: Si habla español, tiene a dahl disposición servicios gratuitos de asistencia lingüística. Llame al [phone].    We comply with applicable federal civil rights laws and [place] laws. We do not discriminate on the basis of race, color, national origin, age, disability, sex, sexual orientation, or gender " identity.            Thank you!     Thank you for choosing [facility]  for your care. Our goal is always to provide you with excellent care. Hearing back from our patients is one way we can continue to improve our services. Please take a few minutes to complete the written survey that you may receive in the mail after your visit with us. Thank you!             Your Updated Medication List - Protect others around you: Learn how to safely use, store and throw away your medicines at www.disposemymeds.org.          This list is accurate as of 3/13/18  3:36 PM.  Always use your most recent med list.                   Brand Name Dispense Instructions for use Diagnosis    prenatal multivitamin plus iron 27-0.8 MG Tabs per tablet     100 tablet    Take 1 tablet by mouth daily    Family planning counseling

## 2018-03-13 NOTE — PROGRESS NOTES
SUBJECTIVE:   Lou Rivers is a 32 year old female who presents to clinic today for the following health issues:      Concern - Nose bleeds   Onset: 2 weeks ago     Description:   Random nose bleeds and blowing her nose she notices blood     Intensity: No pain     Progression of Symptoms:  same    Accompanying Signs & Symptoms:  Syncope, blurred vision     Previous history of similar problem:   No     Precipitating factors:   Worsened by: Not sure     Alleviating factors:  Improved by: Blowing it out     Therapies Tried and outcome: None    Blow nose in morning sees blood on kleenix. One time in middle of night it occurred in bed. Some dark color from left nostril, some brighter red from right nostril. Not using nasal spray or flonase. Feeling dizzy, when she is hungry or after a meal and blurry vision-after 5-10 min it clears. Also has tinnitus. Gets dizzy daily after each meal. Only eats about 1-2 meals a day, gets busy with work.    She feels anxious at times, like her heart is beating fast. Has hx of anxiety, this is likely related to increase vasculature in body and hx of anxiety. Monitor, make sure to do things that relax you.    Pregnant: 25 w 1 day. No vaginal bleeding. No N/V now. In 1st trimester was very nauseated. Next OB in 2 weeks-March 30th. Has some lower abdominal cramping/feeling of movements. With language barrier it sounds more like baby moving and round ligament pain. Does not feel baby is moving less in past 2 weeks.         Problem list and histories reviewed & adjusted, as indicated.  Additional history: as documented    Patient Active Problem List   Diagnosis     Allergic rhinitis     Adjustment disorder with mixed anxiety and depressed mood     Supervision of other normal pregnancy, antepartum     Past Surgical History:   Procedure Laterality Date     LASIK BILATERAL Bilateral      SINUS SURGERY         Social History   Substance Use Topics     Smoking status: Never Smoker     Smokeless  "tobacco: Never Used     Alcohol use No     Family History   Problem Relation Age of Onset     Coronary Artery Disease Mother      Hypertension Mother      Other Cancer Mother      throat cancer     Other - See Comments Mother      neck problems     Thyroid Disease Mother      Coronary Artery Disease Father      Hypertension Father      Hyperlipidemia Father      Other - See Comments Father      herniated disc in lumbar     Gout Father      Liver Cancer Maternal Grandmother      Myocardial Infarction Maternal Grandfather      DIABETES No family hx of      CEREBROVASCULAR DISEASE No family hx of      Breast Cancer No family hx of      Colon Cancer No family hx of      Prostate Cancer No family hx of      Depression No family hx of      Anxiety Disorder No family hx of      MENTAL ILLNESS No family hx of      Substance Abuse No family hx of      Anesthesia Reaction No family hx of      Asthma No family hx of      OSTEOPOROSIS No family hx of      Genetic Disorder No family hx of      Obesity No family hx of      Unknown/Adopted No family hx of          Current Outpatient Prescriptions   Medication Sig Dispense Refill     Prenatal Vit-Fe Fumarate-FA (PRENATAL MULTIVITAMIN PLUS IRON) 27-0.8 MG TABS per tablet Take 1 tablet by mouth daily 100 tablet 3     Allergies   Allergen Reactions     Dust Mites      House dust       Reviewed and updated as needed this visit by clinical staff  Tobacco  Allergies  Meds  Problems  Med Hx  Surg Hx  Fam Hx  Soc Hx        Reviewed and updated as needed this visit by Provider  Allergies  Meds  Problems         ROS:  Constitutional, HEENT-as above, cardiovascular, pulmonary, gi and gu systems are negative, except as otherwise noted.    OBJECTIVE:     /60 (BP Location: Right arm, Patient Position: Sitting, Cuff Size: Adult Regular)  Pulse 97  Temp 98.8  F (37.1  C) (Oral)  Resp 16  Ht 1.607 m (5' 3.25\")  Wt 66.2 kg (146 lb)  LMP 09/18/2017  SpO2 97%  BMI 25.66 " kg/m2  Body mass index is 25.66 kg/(m^2).  GENERAL: healthy, alert and no distress  EYES: Eyes grossly normal to inspection, PERRL and conjunctivae and sclerae normal  HENT: ear canals and TM's normal, nose and mouth without ulcers or lesions. Nasal passages are friable and raw appearing. No active bleeding noted  NECK: no adenopathy, no asymmetry, masses, or scars and thyroid normal to palpation  RESP: lungs clear to auscultation - no rales, rhonchi or wheezes  CV: regular rate and rhythm, normal S1 S2, no S3 or S4, no murmur, click or rub  ABDOMEN: soft, round, nontender, bowel sounds normal. Fetal heart tones present by dopplar  MS: no gross musculoskeletal defects noted, no edema    Diagnostic Test Results:  Results for orders placed or performed in visit on 03/13/18 (from the past 24 hour(s))   Hemoglobin   Result Value Ref Range    Hemoglobin 12.0 11.7 - 15.7 g/dL       ASSESSMENT/PLAN:       1. Epistaxis  Normal hemoglobin. Likely related to pregnancy. Encouraged saline nasal spray, no flonase, and to call if worsens. Also gets dizzy around meal time, likely she is not eating enough or not frequent enough.   - Hemoglobin    2. Pregnancy, incidental  Does not sound like she drinks enough water or eats enough but has gained 5 lb in 2 weeks. Will [facility] OBGYN provider so she is aware. Next OB appointment on 3/30/18. Encouraged to eat every few hours, drink more fluids, do fetal counts: 10 in 1 hour while laying on left side. Call OB if less fetal movements. +fetal heart tones today  - Hemoglobin  - Basic metabolic panel      FUTURE APPOINTMENTS:       - Follow-up visit prn    LAINEY Dallas, NP-C  [facility]

## 2018-03-13 NOTE — NURSING NOTE
"Chief Complaint   Patient presents with     Nose Bleeds       Initial Ht 1.607 m (5' 3.25\")  Wt 66.2 kg (146 lb)  LMP 09/18/2017  BMI 25.66 kg/m2 Estimated body mass index is 25.66 kg/(m^2) as calculated from the following:    Height as of this encounter: 1.607 m (5' 3.25\").    Weight as of this encounter: 66.2 kg (146 lb).  Medication Reconciliation: complete     Megan Arrington, Medical Assistant      "

## 2018-03-13 NOTE — PATIENT INSTRUCTIONS
Count 10 baby movements in 1 hour  Lay on left side to count movements  Call OB if not 10 movements in 1 hour    Dizziness is common in pregnancy, if worsens call OB or PCP.   Make sure to eat something every few hours

## 2018-03-30 ENCOUNTER — PRENATAL OFFICE VISIT (OUTPATIENT)
Dept: OBGYN | Facility: CLINIC | Age: 33
End: 2018-03-30
Payer: COMMERCIAL

## 2018-03-30 VITALS
SYSTOLIC BLOOD PRESSURE: 114 MMHG | HEART RATE: 111 BPM | DIASTOLIC BLOOD PRESSURE: 66 MMHG | OXYGEN SATURATION: 96 % | BODY MASS INDEX: 25.75 KG/M2 | WEIGHT: 146.5 LBS

## 2018-03-30 DIAGNOSIS — Z13.1 SCREENING FOR DIABETES MELLITUS: Primary | ICD-10-CM

## 2018-03-30 DIAGNOSIS — Z34.80 SUPERVISION OF OTHER NORMAL PREGNANCY, ANTEPARTUM: ICD-10-CM

## 2018-03-30 DIAGNOSIS — Z34.82 ENCOUNTER FOR SUPERVISION OF OTHER NORMAL PREGNANCY, SECOND TRIMESTER: Primary | ICD-10-CM

## 2018-03-30 LAB
GLUCOSE 1H P 50 G GLC PO SERPL-MCNC: 156 MG/DL (ref 60–129)
HGB BLD-MCNC: 11.8 G/DL (ref 11.7–15.7)

## 2018-03-30 PROCEDURE — 00000218 ZZHCL STATISTIC OBHBG - HEMOGLOBIN: Performed by: OBSTETRICS & GYNECOLOGY

## 2018-03-30 PROCEDURE — 82950 GLUCOSE TEST: CPT | Performed by: OBSTETRICS & GYNECOLOGY

## 2018-03-30 PROCEDURE — 99207 ZZC PRENATAL VISIT: CPT | Performed by: OBSTETRICS & GYNECOLOGY

## 2018-03-30 PROCEDURE — 36415 COLL VENOUS BLD VENIPUNCTURE: CPT | Performed by: OBSTETRICS & GYNECOLOGY

## 2018-03-30 NOTE — MR AVS SNAPSHOT
After Visit Summary   3/30/2018    Lou Rivers    MRN: [id number]           Patient Information     Date Of Birth          1985        Visit Information        Provider Department      3/30/2018 2:30 PM Wen Laurent DO [facility]        Care Instructions                                                         If you have any questions regarding your visit, Please contact your care team.    Women s Health CLINIC HOURS TELEPHONE NUMBER   Wen Laurent DO.    AVELINA Hoover -    KENNETH Mahoney       [facility], [facility]  8:30a.m-5:00 p.m   [facility]  [address] [place] [address] [phone] ask for [facility]    Imaging [phone]       Urgent Care locations:    [facility] Saturday and Sunday   9 am - 5 pm    Monday-Friday   12 pm - 8 pm  Saturday and Sunday   9 am - 5 pm   [phone]     [facility] Labor and Delivery:  [phone]    If you need a medication refill, please contact your pharmacy. Please allow 3 business days for your refill to be completed.  As always, Thank you for trusting us with your healthcare needs!                Follow-ups after your visit        Your next 10 appointments already scheduled     Apr 20, 2018  3:30 PM CDT   ESTABLISHED PRENATAL with Wen Laurent DO   [facility] ([facility])    [address]  [facility] [address] [phone]              Who to contact     If you have questions or need follow up information about today's clinic visit or your schedule please contact [facility] directly at [phone].  Normal or non-critical lab and imaging results will be communicated to you by MyChart, letter or phone within 4 business days after the clinic has received the results. If you do not hear from us within 7  days, please contact the clinic through [employer] or phone. If you have a critical or abnormal lab result, we will notify you by phone as soon as possible.  Submit refill requests through [employer] or call your pharmacy and they will forward the refill request to us. Please allow 3 business days for your refill to be completed.          Additional Information About Your Visit        [employer] Information     [employer] gives you secure access to your electronic health record. If you see a primary care provider, you can also send messages to your care team and make appointments. If you have questions, please call your primary care clinic.  If you do not have a primary care provider, please call [phone] and they will assist you.        Care EveryWhere ID     This is your Care EveryWhere ID. This could be used by other organizations to access your [place] medical records  [id number]        Your Vitals Were     Pulse Last Period Pulse Oximetry Breastfeeding? BMI (Body Mass Index)       111 09/18/2017 96% No 25.75 kg/m2        Blood Pressure from Last 3 Encounters:   03/30/18 114/66   03/13/18 110/60   02/28/18 123/67    Weight from Last 3 Encounters:   03/30/18 146 lb 8 oz (66.5 kg)   03/13/18 146 lb (66.2 kg)   02/28/18 141 lb 14.4 oz (64.4 kg)              Today, you had the following     No orders found for display       Primary Care Provider Office Phone # Fax #    [facility] [phone]       [address]  [facility] [address]        Equal Access to Services     ALFONSO SHERMAN AH: Hadii aad ku hadasho Soomaali, waaxda luqadaha, qaybta kaalmada adeegyada, waxay idiin hayveronican memo kemp'rojas . So [facility] [phone].    ATENCIÓN: Si habla español, tiene a dahl disposición servicios gratuitos de asistencia lingüística. Llame al [phone].    We comply with applicable federal civil rights laws and [place] laws. We do not discriminate on the basis of race, color, national  origin, age, disability, sex, sexual orientation, or gender identity.            Thank you!     Thank you for choosing [facility]  for your care. Our goal is always to provide you with excellent care. Hearing back from our patients is one way we can continue to improve our services. Please take a few minutes to complete the written survey that you may receive in the mail after your visit with us. Thank you!             Your Updated Medication List - Protect others around you: Learn how to safely use, store and throw away your medicines at www.disposemymeds.org.          This list is accurate as of 3/30/18  2:51 PM.  Always use your most recent med list.                   Brand Name Dispense Instructions for use Diagnosis    prenatal multivitamin plus iron 27-0.8 MG Tabs per tablet     100 tablet    Take 1 tablet by mouth daily    Family planning counseling

## 2018-03-30 NOTE — PROGRESS NOTES
She reports feeling reassuring daily fetal activity and will continue to record.  She gained 5 lbs since her last visit and is having her glucose and hgb values checked today.  She prefers to hold off on the Tdap vaccine today but plans to receive this at her next visit.  She denies any fluid leakage or regular uterine contractions.

## 2018-03-30 NOTE — PATIENT INSTRUCTIONS
If you have any questions regarding your visit, Please contact your care team.    Women s Health CLINIC HOURS TELEPHONE NUMBER   Wen Laurent DO.    AVELINA Hoover -    KENNETH Mahoney       Monday, Wednesday, Thursday and Friday, [place]  8:30a.m-5:00 p.m   [facility]  [address] [place] [address]  [phone] ask for [facility]    Imaging [phone]       Urgent Care locations:    [facility] Saturday and Sunday   9 am - 5 pm    Monday-Friday   12 pm - 8 pm  Saturday and Sunday   9 am - 5 pm   [phone]     [facility] Labor and Delivery:  [phone]    If you need a medication refill, please contact your pharmacy. Please allow 3 business days for your refill to be completed.  As always, Thank you for trusting us with your healthcare needs!

## 2018-04-10 DIAGNOSIS — Z13.1 SCREENING FOR DIABETES MELLITUS: ICD-10-CM

## 2018-04-10 LAB
GLUCOSE 1H P 100 G GLC PO SERPL-MCNC: 200 MG/DL (ref 60–179)
GLUCOSE 2H P 100 G GLC PO SERPL-MCNC: 140 MG/DL (ref 60–154)
GLUCOSE 3H P 100 G GLC PO SERPL-MCNC: 122 MG/DL (ref 60–139)
GLUCOSE BLDC GLUCOMTR-MCNC: 92 MG/DL (ref 70–99)
GLUCOSE P FAST SERPL-MCNC: 88 MG/DL (ref 60–94)

## 2018-04-10 PROCEDURE — 36415 COLL VENOUS BLD VENIPUNCTURE: CPT | Performed by: OBSTETRICS & GYNECOLOGY

## 2018-04-10 PROCEDURE — 82952 GTT-ADDED SAMPLES: CPT | Performed by: OBSTETRICS & GYNECOLOGY

## 2018-04-10 PROCEDURE — 82951 GLUCOSE TOLERANCE TEST (GTT): CPT | Performed by: OBSTETRICS & GYNECOLOGY

## 2018-04-20 ENCOUNTER — PRENATAL OFFICE VISIT (OUTPATIENT)
Dept: OBGYN | Facility: CLINIC | Age: 33
End: 2018-04-20
Payer: COMMERCIAL

## 2018-04-20 VITALS
OXYGEN SATURATION: 97 % | HEART RATE: 98 BPM | BODY MASS INDEX: 25.48 KG/M2 | WEIGHT: 145 LBS | DIASTOLIC BLOOD PRESSURE: 67 MMHG | SYSTOLIC BLOOD PRESSURE: 105 MMHG

## 2018-04-20 DIAGNOSIS — O92.70 LACTATION PROBLEM: ICD-10-CM

## 2018-04-20 DIAGNOSIS — Z34.82 ENCOUNTER FOR SUPERVISION OF OTHER NORMAL PREGNANCY, SECOND TRIMESTER: ICD-10-CM

## 2018-04-20 DIAGNOSIS — Z23 NEED FOR TDAP VACCINATION: Primary | ICD-10-CM

## 2018-04-20 PROCEDURE — 90715 TDAP VACCINE 7 YRS/> IM: CPT | Performed by: OBSTETRICS & GYNECOLOGY

## 2018-04-20 PROCEDURE — 99207 ZZC PRENATAL VISIT: CPT | Performed by: OBSTETRICS & GYNECOLOGY

## 2018-04-20 PROCEDURE — 90471 IMMUNIZATION ADMIN: CPT | Performed by: OBSTETRICS & GYNECOLOGY

## 2018-04-20 NOTE — NURSING NOTE
Screening Questionnaire for Adult Immunization    Are you sick today?   No   Do you have allergies to medications, food, a vaccine component or latex?   No   Have you ever had a serious reaction after receiving a vaccination?   No   Do you have a long-term health problem with heart disease, lung disease, asthma, kidney disease, metabolic disease (e.g. diabetes), anemia, or other blood disorder?   No   Do you have cancer, leukemia, HIV/AIDS, or any other immune system problem?   No   In the past 3 months, have you taken medications that affect  your immune system, such as prednisone, other steroids, or anticancer drugs; drugs for the treatment of rheumatoid arthritis, Crohn s disease, or psoriasis; or have you had radiation treatments?   No   Have you had a seizure, or a brain or other nervous system problem?   No   During the past year, have you received a transfusion of blood or blood     products, or been given immune (gamma) globulin or antiviral drug?   No   For women: Are you pregnant or is there a chance you could become        pregnant during the next month?   Yes   Have you received any vaccinations in the past 4 weeks?   No     Immunization questionnaire was positive for at least one answer.  Notified Dr. Laurent.        Per orders of Dr. Laurent, injection of Tdap given by Sneha Edgar. Patient instructed to remain in clinic for 15 minutes afterwards, and to report any adverse reaction to me immediately.       Screening performed by Sneha Edgar on 4/20/2018 at 3:40 PM.

## 2018-04-20 NOTE — PATIENT INSTRUCTIONS
If you have any questions regarding your visit, Please contact your care team.    Women s Health CLINIC HOURS TELEPHONE NUMBER   Wen Laurent DO.    AVELINA Hoover -    KENNETH Mahoney       Monday, Wednesday, Thursday and Friday, [place]  8:30a.m-5:00 p.m   [facility]  [address] [place] [address]  [phone] ask for [facility]    Imaging [phone]       Urgent Care locations:    [facility] Saturday and Sunday   9 am - 5 pm    Monday-Friday   12 pm - 8 pm  Saturday and Sunday   9 am - 5 pm   [phone]     [facility] Labor and Delivery:  [phone]    If you need a medication refill, please contact your pharmacy. Please allow 3 business days for your refill to be completed.  As always, Thank you for trusting us with your healthcare needs!

## 2018-04-20 NOTE — MR AVS SNAPSHOT
After Visit Summary   4/20/2018    Lou Rivers    MRN: [id number]           Patient Information     Date Of Birth          1985        Visit Information        Provider Department      4/20/2018 3:30 PM Wen Laurent DO [facility]        Care Instructions                                                         If you have any questions regarding your visit, Please contact your care team.    [facility] [place] CLINIC HOURS TELEPHONE NUMBER   Wen DO Anastasiia.    AVELINA Hoover -    KENNETH Mahoney       [facility], Wednesday, [facility]  8:30a.m-5:00 p.m   [facility]  [address] [place] [address]  [phone] ask for [facility]    Imaging Ldemoreogi-[phone]       Urgent Care locations:    [facility] Saturday and Sunday   9 am - 5 pm    Monday-Friday   12 pm - 8 pm  Saturday and Sunday   9 am - 5 pm   [phone]     [facility] Labor and Delivery:  [phone]    If you need a medication refill, please contact your pharmacy. Please allow 3 business days for your refill to be completed.  As always, Thank you for trusting us with your healthcare needs!                Follow-ups after your visit        Your next 10 appointments already scheduled     May 04, 2018 12:45 PM CDT   ESTABLISHED PRENATAL with Sneha Guajardo MD   [facility] ([facility])    [address]  [facility] [address]   [phone]            May 18, 2018  3:15 PM CDT   ESTABLISHED PRENATAL with Wen Laurent DO   [facility] ([facility])    [address] [facility] [address]   [phone]            May 24, 2018  4:45 PM CDT   ESTABLISHED PRENATAL with Wen Laurent DO   [facility] ([facility])    [address] [facility]  [id number] [phone]            May 31, 2018  4:45 PM CDT   ESTABLISHED PRENATAL with Wen Laurent, DO   [facility] ([facility])    [address] [place] [address] [phone]              Who to contact     If you have questions or need follow up information about today's clinic visit or your schedule please contact [facility] directly at [phone].  Normal or non-critical lab and imaging results will be communicated to you by Fantomhart, letter or phone within 4 business days after the clinic has received the results. If you do not hear from us within 7 days, please contact the clinic through Skyengt or phone. If you have a critical or abnormal lab result, we will notify you by phone as soon as possible.  Submit refill requests through [employer] or call your pharmacy and they will forward the refill request to us. Please allow 3 business days for your refill to be completed.          Additional Information About Your Visit        FantomharNudipay Mobile Payment Information     [employer] gives you secure access to your electronic health record. If you see a primary care provider, you can also send messages to your care team and make appointments. If you have questions, please call your primary care clinic.  If you do not have a primary care provider, please call [phone] and they will assist you.        Care EveryWhere ID     This is your Care EveryWhere ID. This could be used by other organizations to access your [facility] medical records  [id number]        Your Vitals Were     Pulse Last Period Pulse Oximetry Breastfeeding? BMI (Body Mass Index)       98 09/18/2017 97% No 25.48 kg/m2        Blood Pressure from Last 3 Encounters:   04/20/18 105/67   03/30/18 114/66   03/13/18 110/60    Weight from Last 3 Encounters:   04/20/18 145 lb (65.8 kg)   03/30/18 146 lb 8 oz (66.5 kg)   03/13/18 146 lb (66.2 kg)              Today, you had the following     No  orders found for display       Primary Care Provider Office Phone # Fax #    [facility] [phone] [address]  [facility] [address]        Equal Access to Services     ALFONSO SHERMAN : Hadii aad ku hadhomeo Sochelseaali, waaxda luqadaha, qaybta kaalmada adeegyada, nicole rojon memo dsouza sam weaver. So [facility] [phone].    ATENCIÓN: Si habla español, tiene a dahl disposición servicios gratuitos de asistencia lingüística. Llame al [phone].    We comply with applicable federal civil rights laws and [place] laws. We do not discriminate on the basis of race, color, national origin, age, disability, sex, sexual orientation, or gender identity.            Thank you!     Thank you for choosing [facility]  for your care. Our goal is always to provide you with excellent care. Hearing back from our patients is one way we can continue to improve our services. Please take a few minutes to complete the written survey that you may receive in the mail after your visit with us. Thank you!             Your Updated Medication List - Protect others around you: Learn how to safely use, store and throw away your medicines at www.disposemymeds.org.          This list is accurate as of 4/20/18  3:39 PM.  Always use your most recent med list.                   Brand Name Dispense Instructions for use Diagnosis    prenatal multivitamin plus iron 27-0.8 MG Tabs per tablet     100 tablet    Take 1 tablet by mouth daily    Family planning counseling

## 2018-04-20 NOTE — PROGRESS NOTES
TDAP Vaccine (Adacel)      Date Status Dose VIS Date Route Site  Lot# Given By Verified By     4/20/2018 Given 0.5 mL 02/24/2015, Given Today IM RD [employer] [id number] Sneha Edgar CMA --         Exp. Date NDC # Product Time Location External Comment     11/1/2019 95011-546-28  --  --      Updated by: Sneha Edgar CMA on 4/20/2018  3:50 PM

## 2018-04-20 NOTE — PROGRESS NOTES
She reports feeling reassuring daily fetal activity and will continue to record.  She lost 1 lb since her last visit but denies dieting.  She is happy to have passed her 3-hour GTT and her hgb was normal.  She requests a breast pump script since she already purchased one.  She would like a Tdap vaccine so this was provided today.

## 2018-05-04 ENCOUNTER — PRENATAL OFFICE VISIT (OUTPATIENT)
Dept: OBGYN | Facility: CLINIC | Age: 33
End: 2018-05-04
Payer: COMMERCIAL

## 2018-05-04 VITALS
WEIGHT: 147.2 LBS | SYSTOLIC BLOOD PRESSURE: 107 MMHG | TEMPERATURE: 97.8 F | HEART RATE: 103 BPM | DIASTOLIC BLOOD PRESSURE: 70 MMHG | BODY MASS INDEX: 25.87 KG/M2

## 2018-05-04 DIAGNOSIS — Z34.83 ENCOUNTER FOR SUPERVISION OF OTHER NORMAL PREGNANCY, THIRD TRIMESTER: Primary | ICD-10-CM

## 2018-05-04 PROCEDURE — 99207 ZZC PRENATAL VISIT: CPT | Performed by: OBSTETRICS & GYNECOLOGY

## 2018-05-04 NOTE — PROGRESS NOTES
Presents for routine  appointment.    She has some blood noses and some congestion.  She has some green mucous discharge.   Breast concerns.   No LOF/VB/Ctxs.    ROS:   and GI  negative.     Please see Prenatal Vitals and Notes Flowsheet for objective data.    Breast exam normal      A/P:  32 year old  at 32w4d       ICD-10-CM    1. Encounter for supervision of other normal pregnancy, third trimester Z34.83        Supportive care for sinus.  She will try saline spray.  She will let us know if her symptoms worse or do not improve.   Tdap given at previous visit   Follow up in 2 weeks.      Sneha Guajardo MD

## 2018-05-04 NOTE — NURSING NOTE
"Chief Complaint   Patient presents with     Prenatal Care       Initial /70 (BP Location: Right arm, Patient Position: Chair, Cuff Size: Adult Regular)  Pulse 103  Temp 97.8  F (36.6  C) (Tympanic)  Wt 147 lb 3.2 oz (66.8 kg)  LMP 2017  BMI 25.87 kg/m2 Estimated body mass index is 25.87 kg/(m^2) as calculated from the following:    Height as of 3/13/18: 5' 3.25\" (1.607 m).    Weight as of this encounter: 147 lb 3.2 oz (66.8 kg).  BP completed using cuff size: regular        Ann Perales, AVELINA  May 4, 2018        "

## 2018-05-04 NOTE — MR AVS SNAPSHOT
After Visit Summary   5/4/2018    Lou Rivers    MRN: [id number]           Patient Information     Date Of Birth          1985        Visit Information        Provider Department      5/4/2018 12:45 PM Sneha Guajardo MD [facility]        Today's Diagnoses     Encounter for supervision of other normal pregnancy, third trimester    -  1       Follow-ups after your visit        Follow-up notes from your care team     Return in about 2 weeks (around 5/18/2018).      Your next 10 appointments already scheduled     May 18, 2018  3:15 PM CDT   ESTABLISHED PRENATAL with Wen RamirezddamDO   [facility]    [address] [place] [id number] [phone]            May 24, 2018  4:45 PM CDT   ESTABLISHED PRENATAL with Wen RamirezddamDO   [facility]    [address] [place] [phone] [phone]            May 31, 2018  4:45 PM CDT   ESTABLISHED PRENATAL with Wen RamirezddamDO   [facility]    [address] [place] [id number] [phone]              Who to contact     If you have questions or need follow up information about today's clinic visit or your schedule please contact [facility] directly at [phone].  Normal or non-critical lab and imaging results will be communicated to you by MyChart, letter or phone within 4 business days after the clinic has received the results. If you do not hear from us within 7 days, please contact the clinic through MyChart or phone. If you have a critical or abnormal lab result, we will notify you by phone as soon as possible.  Submit refill requests through [email or web address] or call your pharmacy and they will forward the refill request to us. Please allow 3 business days for your refill to be completed.          Additional  Information About Your Visit        MyChart Information     [employer] gives you secure access to your electronic health record. If you see a primary care provider, you can also send messages to your care team and make appointments. If you have questions, please call your primary care clinic.  If you do not have a primary care provider, please call [phone] and they will assist you.        Care EveryWhere ID     This is your Care EveryWhere ID. This could be used by other organizations to access your [facility] medical records  [id number]        Your Vitals Were     Pulse Temperature Last Period BMI (Body Mass Index)          103 97.8  F (36.6  C) (Tympanic) 09/18/2017 25.87 kg/m2         Blood Pressure from Last 3 Encounters:   05/04/18 107/70   04/20/18 105/67   03/30/18 114/66    Weight from Last 3 Encounters:   05/04/18 147 lb 3.2 oz (66.8 kg)   04/20/18 145 lb (65.8 kg)   03/30/18 146 lb 8 oz (66.5 kg)              Today, you had the following     No orders found for display       Primary Care Provider Office Phone # Fax #    [facility] [phone]       [address]  [facility] [address]        Equal Access to Services     ALFONSO SHERMAN : Hadii aad ku hadasho Soomaali, waaxda luqadaha, qaybta kaalmada adeegyada, waxay idiin hayveronican memo khjuancarlos la'rojas weaver. So [facility] [phone].    ATENCIÓN: Si habla español, tiene a dahl disposición servicios gratuitos de asistencia lingüística. Llame al [phone].    We comply with applicable federal civil rights laws and [place] laws. We do not discriminate on the basis of race, color, national origin, age, disability, sex, sexual orientation, or gender identity.            Thank you!     Thank you for choosing [facility]  for your care. Our goal is always to provide you with excellent care. Hearing back from our patients is one way we can continue to improve our services. Please take a few minutes to complete the  written survey that you may receive in the mail after your visit with us. Thank you!             Your Updated Medication List - Protect others around you: Learn how to safely use, store and throw away your medicines at www.disposemymeds.org.          This list is accurate as of 5/4/18  1:09 PM.  Always use your most recent med list.                   Brand Name Dispense Instructions for use Diagnosis    order for DME     1 pump    Equipment being ordered: Breast Pump    Lactation problem       prenatal multivitamin plus iron 27-0.8 MG Tabs per tablet     100 tablet    Take 1 tablet by mouth daily    Family planning counseling

## 2018-05-08 ENCOUNTER — TELEPHONE (OUTPATIENT)
Dept: FAMILY MEDICINE | Facility: OTHER | Age: 33
End: 2018-05-08

## 2018-05-08 NOTE — TELEPHONE ENCOUNTER
Reason for Call:  Form, our goal is to have forms completed with 72 hours, however, some forms may require a visit or additional information.    Type of letter, form or note:  medical    Who is the form from?: Order (if other please explain)    Where did the form come from: form was faxed in    What clinic location was the form placed at?: [facility] - [phone]    Where the form was placed: 's Box    What number is listed as a contact on the form?:[phone]       Additional comments: Fax to     Call taken on 5/8/2018 at 9:42 AM by Amber Lundberg

## 2018-05-18 ENCOUNTER — PRENATAL OFFICE VISIT (OUTPATIENT)
Dept: OBGYN | Facility: CLINIC | Age: 33
End: 2018-05-18
Payer: COMMERCIAL

## 2018-05-18 VITALS
DIASTOLIC BLOOD PRESSURE: 68 MMHG | SYSTOLIC BLOOD PRESSURE: 105 MMHG | WEIGHT: 148.8 LBS | BODY MASS INDEX: 26.15 KG/M2 | HEART RATE: 113 BPM | OXYGEN SATURATION: 96 %

## 2018-05-18 DIAGNOSIS — Z34.83 ENCOUNTER FOR SUPERVISION OF OTHER NORMAL PREGNANCY, THIRD TRIMESTER: Primary | ICD-10-CM

## 2018-05-18 PROCEDURE — 99207 ZZC PRENATAL VISIT: CPT | Performed by: OBSTETRICS & GYNECOLOGY

## 2018-05-18 NOTE — MR AVS SNAPSHOT
After Visit Summary   5/18/2018    Lou Rivers    MRN: [id number]           Patient Information     Date Of Birth          1985        Visit Information        Provider Department      5/18/2018 3:15 PM Wen Laurent DO [facility]        Care Instructions                                                         If you have any questions regarding your visit, Please contact your care team.    [facility] CLINIC HOURS TELEPHONE NUMBER   Wen DO Anastasiia.    AVELINA Hoover -    KENNETH Mahoney       [facility], [facility]  8:30a.m-5:00 p.m   [facility]  [address] [place] [address]  [phone] ask for [facility]    Imaging Nrykahusvc-[phone]       Urgent Care locations:    [facility] Saturday and Sunday   9 am - 5 pm    Monday-Friday   12 pm - 8 pm  Saturday and Sunday   9 am - 5 pm   [phone]     [facility] Labor and Delivery:  [phone]    If you need a medication refill, please contact your pharmacy. Please allow 3 business days for your refill to be completed.  As always, Thank you for trusting us with your healthcare needs!                Follow-ups after your visit        Your next 10 appointments already scheduled     May 18, 2018  3:15 PM CDT   ESTABLISHED PRENATAL with Wen Laurent DO   [facility] ([facility])    [address]  [facility] [address] [phone]            May 24, 2018  4:45 PM CDT   ESTABLISHED PRENATAL with Wen Daryl Laurent DO   [facility] ([facility])    [address]  [facility] [id number] [phone]            May 31, 2018  4:45 PM CDT   ESTABLISHED PRENATAL with Wen Daryl Laurent DO   [facility] ([facility])    [address] [facility]  [place] [address] [phone]              Who to contact     If you have questions or need follow up information about today's clinic visit or your schedule please contact [facility] directly at [phone].  Normal or non-critical lab and imaging results will be communicated to you by MyChart, letter or phone within 4 business days after the clinic has received the results. If you do not hear from us within 7 days, please contact the clinic through [employer] Mobilehart or phone. If you have a critical or abnormal lab result, we will notify you by phone as soon as possible.  Submit refill requests through [employer] or call your pharmacy and they will forward the refill request to us. Please allow 3 business days for your refill to be completed.          Additional Information About Your Visit        [employer] Mobile[employer]Favbuy Information     [employer] gives you secure access to your electronic health record. If you see a primary care provider, you can also send messages to your care team and make appointments. If you have questions, please call your primary care clinic.  If you do not have a primary care provider, please call [phone] and they will assist you.        Care EveryWhere ID     This is your Care EveryWhere ID. This could be used by other organizations to access your [place] medical records  [id number]        Your Vitals Were     Pulse Last Period Pulse Oximetry Breastfeeding? BMI (Body Mass Index)       113 09/18/2017 96% No 26.15 kg/m2        Blood Pressure from Last 3 Encounters:   05/18/18 105/68   05/04/18 107/70   04/20/18 105/67    Weight from Last 3 Encounters:   05/18/18 148 lb 12.8 oz (67.5 kg)   05/04/18 147 lb 3.2 oz (66.8 kg)   04/20/18 145 lb (65.8 kg)              Today, you had the following     No orders found for display       Primary Care Provider Office Phone # Fax #    [facility] [phone]       [address]  [facility] [address]        Equal Access  to Services     ALFONSO SHERMAN : Adiel Gee, wameek tejada, qanicole ugalde. So [facility] [phone].    ATENCIÓN: Si habla español, tiene a dahl disposición servicios gratuitos de asistencia lingüística. Llame al [phone].    We comply with applicable federal civil rights laws and [place] laws. We do not discriminate on the basis of race, color, national origin, age, disability, sex, sexual orientation, or gender identity.            Thank you!     Thank you for choosing [facility]  for your care. Our goal is always to provide you with excellent care. Hearing back from our patients is one way we can continue to improve our services. Please take a few minutes to complete the written survey that you may receive in the mail after your visit with us. Thank you!             Your Updated Medication List - Protect others around you: Learn how to safely use, store and throw away your medicines at www.disposemymeds.org.          This list is accurate as of 5/18/18  2:57 PM.  Always use your most recent med list.                   Brand Name Dispense Instructions for use Diagnosis    order for DME     1 pump    Equipment being ordered: Breast Pump    Lactation problem       prenatal multivitamin plus iron 27-0.8 MG Tabs per tablet     100 tablet    Take 1 tablet by mouth daily    Family planning counseling

## 2018-05-18 NOTE — PATIENT INSTRUCTIONS
If you have any questions regarding your visit, Please contact your care team.    [facility] CLINIC HOURS TELEPHONE NUMBER   Wen Laurent DO.    AVELINA Hoover -    KENNETH Mahoney       Monday, Wednesday, Thursday and Friday, [place]  8:30a.m-5:00 p.m   [facility]  [address] [place] [address]  [phone] ask for [facility]    Imaging Ephnmxyzih-[phone]       Urgent Care locations:    [facility] Saturday and Sunday   9 am - 5 pm    Monday-Friday   12 pm - 8 pm  Saturday and Sunday   9 am - 5 pm   [phone]     [facility] Labor and Delivery:  [phone]    If you need a medication refill, please contact your pharmacy. Please allow 3 business days for your refill to be completed.  As always, Thank you for trusting us with your healthcare needs!

## 2018-05-18 NOTE — PROGRESS NOTES
She reports feeling reassuring daily fetal activity and will continue to record.  She gained 1 lb since her last visit and denies any fluid leakage or regular uterine contractions.  She has questions about her  in regards to travel and sun exposure so will have her make an appt with a pediatrician to discuss.

## 2018-05-24 ENCOUNTER — PRENATAL OFFICE VISIT (OUTPATIENT)
Dept: OBGYN | Facility: CLINIC | Age: 33
End: 2018-05-24
Payer: COMMERCIAL

## 2018-05-24 VITALS
BODY MASS INDEX: 26.4 KG/M2 | SYSTOLIC BLOOD PRESSURE: 104 MMHG | DIASTOLIC BLOOD PRESSURE: 69 MMHG | HEART RATE: 98 BPM | OXYGEN SATURATION: 97 % | WEIGHT: 150.2 LBS

## 2018-05-24 DIAGNOSIS — Z34.80 SUPERVISION OF OTHER NORMAL PREGNANCY, ANTEPARTUM: Primary | ICD-10-CM

## 2018-05-24 PROCEDURE — 87653 STREP B DNA AMP PROBE: CPT | Performed by: OBSTETRICS & GYNECOLOGY

## 2018-05-24 PROCEDURE — 87186 SC STD MICRODIL/AGAR DIL: CPT | Performed by: OBSTETRICS & GYNECOLOGY

## 2018-05-24 PROCEDURE — 99207 ZZC PRENATAL VISIT: CPT | Performed by: OBSTETRICS & GYNECOLOGY

## 2018-05-24 NOTE — MR AVS SNAPSHOT
After Visit Summary   5/24/2018    Lou Rivers    MRN: [id number]           Patient Information     Date Of Birth          1985        Visit Information        Provider Department      5/24/2018 4:45 PM Wen Laurent DO [facility]        Today's Diagnoses     Supervision of other normal pregnancy, antepartum    -  1      Care Instructions                                                         If you have any questions regarding your visit, Please contact your care team.    [facility] CLINIC HOURS TELEPHONE NUMBER   Wen Laurent DO.    AVELINA Hoover -    KENNETH Mahoney       [facility], Wednesday, [facility]  8:30a.m-5:00 p.m   [facility]  [address]  [place], [place] [address] [phone] ask for [facility]    Imaging [phone]       Urgent Care locations:    [facility] Saturday and Sunday   9 am - 5 pm    Monday-Friday   12 pm - 8 pm  Saturday and Sunday   9 am - 5 pm   [phone]     [facility] Labor and Delivery:  [phone]    If you need a medication refill, please contact your pharmacy. Please allow 3 business days for your refill to be completed.  As always, Thank you for trusting us with your healthcare needs!                Follow-ups after your visit        Your next 10 appointments already scheduled     May 31, 2018  4:45 PM CDT   ESTABLISHED PRENATAL with Wen Laurent DO   [facility] ([facility]    [address] [facility] [id number] [phone]            Jun 04, 2018  3:50 PM CDT   No Charge with [facility]    [address] [facility] [address] [phone]              Who to contact     If you have questions or need follow up information about today's [facility]  visit or your schedule please contact [facility] directly at [phone].  Normal or non-critical lab and imaging results will be communicated to you by MyChart, letter or phone within 4 business days after the clinic has received the results. If you do not hear from us within 7 days, please contact the clinic through [employer] or phone. If you have a critical or abnormal lab result, we will notify you by phone as soon as possible.  Submit refill requests through [employer] or call your pharmacy and they will forward the refill request to us. Please allow 3 business days for your refill to be completed.          Additional Information About Your Visit        [employer] Information     [employer] gives you secure access to your electronic health record. If you see a primary care provider, you can also send messages to your care team and make appointments. If you have questions, please call your primary care clinic.  If you do not have a primary care provider, please call [phone] and they will assist you.        Care EveryWhere ID     This is your Care EveryWhere ID. This could be used by other organizations to access your [place] medical records  [id number]        Your Vitals Were     Pulse Last Period Pulse Oximetry Breastfeeding? BMI (Body Mass Index)       98 09/18/2017 97% No 26.4 kg/m2        Blood Pressure from Last 3 Encounters:   05/24/18 104/69   05/18/18 105/68   05/04/18 107/70    Weight from Last 3 Encounters:   05/24/18 150 lb 3.2 oz (68.1 kg)   05/18/18 148 lb 12.8 oz (67.5 kg)   05/04/18 147 lb 3.2 oz (66.8 kg)              We Performed the Following     Group B strep PCR        Primary Care Provider Office Phone # Fax #    [facility] [phone]       [address]  [facility] [address]        Equal Access to Services     ALFONSO SHERMAN : Adiel Gee, waedgardda luqadaha, qaybta kaaldong jama, nicole dsouza  lazofia weaver. So [facility] [phone].    ATENCIÓN: Si habla adriana, tiene a dahl disposición servicios gratuitos de asistencia lingüística. Phi al [phone].    We comply with applicable federal civil rights laws and [place] laws. We do not discriminate on the basis of race, color, national origin, age, disability, sex, sexual orientation, or gender identity.            Thank you!     Thank you for choosing [facility]  for your care. Our goal is always to provide you with excellent care. Hearing back from our patients is one way we can continue to improve our services. Please take a few minutes to complete the written survey that you may receive in the mail after your visit with us. Thank you!             Your Updated Medication List - Protect others around you: Learn how to safely use, store and throw away your medicines at www.disposemymeds.org.          This list is accurate as of 5/24/18  5:09 PM.  Always use your most recent med list.                   Brand Name Dispense Instructions for use Diagnosis    order for DME     1 pump    Equipment being ordered: Breast Pump    Lactation problem       prenatal multivitamin plus iron 27-0.8 MG Tabs per tablet     100 tablet    Take 1 tablet by mouth daily    Family planning counseling

## 2018-05-24 NOTE — PATIENT INSTRUCTIONS
If you have any questions regarding your visit, Please contact your care team.    Women s Health CLINIC HOURS TELEPHONE NUMBER   Wen Laurent DO.    AVELINA Hoover -    KENNETH Mahoney       Monday, Wednesday, Thursday and Friday, [place]  8:30a.m-5:00 p.m   [facility]  [address] [place] [address]  [phone] ask for [facility]    Imaging [phone]       Urgent Care locations:    [facility] Saturday and Sunday   9 am - 5 pm    Monday-Friday   12 pm - 8 pm  Saturday and Sunday   9 am - 5 pm   [phone]     [facility] Labor and Delivery:  [phone]    If you need a medication refill, please contact your pharmacy. Please allow 3 business days for your refill to be completed.  As always, Thank you for trusting us with your healthcare needs!

## 2018-05-24 NOTE — PROGRESS NOTES
She reports feeling reassuring daily fetal activity and will continue to record.  She denies any fluid leakage or regular uterine contractions.  She gained 2 lbs since her last visit and her GBS cultures were obtained and submitted today.  I attempted to check her cervix afterwards, though, but she would not comply.  She appears to be very nervous about cervical exams and I'm not sure how she is going to tolerate labor and vaginal delivery due to her inability to relax.  She attended the prenatal class at [facility], yet does not seem to be realistic about the laboring and delivering process.

## 2018-05-25 LAB
GP B STREP DNA SPEC QL NAA+PROBE: POSITIVE
SPECIMEN SOURCE: ABNORMAL

## 2018-05-28 LAB
BACTERIA SPEC CULT: ABNORMAL
SPECIMEN SOURCE: ABNORMAL

## 2018-05-31 ENCOUNTER — PRENATAL OFFICE VISIT (OUTPATIENT)
Dept: OBGYN | Facility: CLINIC | Age: 33
End: 2018-05-31
Payer: COMMERCIAL

## 2018-05-31 VITALS
OXYGEN SATURATION: 97 % | HEART RATE: 106 BPM | BODY MASS INDEX: 26.78 KG/M2 | WEIGHT: 152.4 LBS | DIASTOLIC BLOOD PRESSURE: 66 MMHG | SYSTOLIC BLOOD PRESSURE: 112 MMHG

## 2018-05-31 DIAGNOSIS — Z34.83 ENCOUNTER FOR SUPERVISION OF OTHER NORMAL PREGNANCY, THIRD TRIMESTER: Primary | ICD-10-CM

## 2018-05-31 PROCEDURE — 99207 ZZC PRENATAL VISIT: CPT | Performed by: OBSTETRICS & GYNECOLOGY

## 2018-05-31 NOTE — MR AVS SNAPSHOT
After Visit Summary   5/31/2018    Lou Rivers    MRN: [id number]           Patient Information     Date Of Birth          1985        Visit Information        Provider Department      5/31/2018 4:45 PM Wen Laurent DO [facility]         Follow-ups after your visit        Your next 10 appointments already scheduled     Jun 04, 2018  3:50 PM CDT   [facility]    [address] [place] [address]   [phone]            Jun 07, 2018  3:45 PM CDT   ESTABLISHED PRENATAL with Wen Laurent DO   [facility]    [address] [place] [address] [phone]            Sree 15, 2018  3:30 PM CDT   ESTABLISHED PRENATAL with Sneha Guajardo MD   [facility] ([facility])    [address] [place] [address] [phone]              Who to contact     If you have questions or need follow up information about today's clinic visit or your schedule please contact [facility] directly at [phone].  Normal or non-critical lab and imaging results will be communicated to you by MyChart, letter or phone within 4 business days after the clinic has received the results. If you do not hear from us within 7 days, please contact the clinic through MyChart or phone. If you have a critical or abnormal lab result, we will notify you by phone as soon as possible.  Submit refill requests through [employer] or call your pharmacy and they will forward the refill request to us. Please allow 3 business days for your refill to be completed.          Additional Information About Your Visit        [employer] Information     [employer] gives you secure access to your electronic health record. If you see a primary care provider, you can also send messages to your  care team and make appointments. If you have questions, please call your primary care clinic.  If you do not have a primary care provider, please call [phone] and they will assist you.        Care EveryWhere ID     This is your Care EveryWhere ID. This could be used by other organizations to access your [place] medical records  [id number]        Your Vitals Were     Pulse Last Period Pulse Oximetry BMI (Body Mass Index)          106 09/18/2017 97% 26.78 kg/m2         Blood Pressure from Last 3 Encounters:   05/31/18 112/66   05/24/18 104/69   05/18/18 105/68    Weight from Last 3 Encounters:   05/31/18 152 lb 6.4 oz (69.1 kg)   05/24/18 150 lb 3.2 oz (68.1 kg)   05/18/18 148 lb 12.8 oz (67.5 kg)              Today, you had the following     No orders found for display       Primary Care Provider Office Phone # Fax #    [facility] [phone]       [address]  [facility] [address]        Equal Access to Services     TARIK SHERMAN : Hadii aad ku hadasho Soomaali, waaxda luqadaha, qaybta kaalmada adedonaldo, nicole vargas . So [facility] [phone].    ATENCIÓN: Si habla español, tiene a dahl disposición servicios gratuitos de asistencia lingüística. Phi al [phone].    We comply with applicable federal civil rights laws and [place] laws. We do not discriminate on the basis of race, color, national origin, age, disability, sex, sexual orientation, or gender identity.            Thank you!     Thank you for choosing [facility]  for your care. Our goal is always to provide you with excellent care. Hearing back from our patients is one way we can continue to improve our services. Please take a few minutes to complete the written survey that you may receive in the mail after your visit with us. Thank you!             Your Updated Medication List - Protect others around you: Learn how to safely use, store and throw away your  medicines at www.disposemymeds.org.          This list is accurate as of 5/31/18  5:07 PM.  Always use your most recent med list.                   Brand Name Dispense Instructions for use Diagnosis    order for DME     1 pump    Equipment being ordered: Breast Pump    Lactation problem       prenatal multivitamin plus iron 27-0.8 MG Tabs per tablet     100 tablet    Take 1 tablet by mouth daily    Family planning counseling

## 2018-05-31 NOTE — PROGRESS NOTES
She reports feeling reassuring daily fetal activity and will continue to record.  She denies any fluid leakage or regular uterine contractions.  She gained 2 lbs since her last visit and understands that she is + for GBS.  She declines a cervical exam today even though it was recommended.

## 2018-06-04 ENCOUNTER — OFFICE VISIT (OUTPATIENT)
Dept: PEDIATRICS | Facility: CLINIC | Age: 33
End: 2018-06-04
Payer: COMMERCIAL

## 2018-06-04 PROCEDURE — 99207 ZZC NO CHARGE LOS: CPT | Performed by: PEDIATRICS

## 2018-06-04 NOTE — MR AVS SNAPSHOT
After Visit Summary   6/4/2018    Lou Rivers    MRN: [id number]           Patient Information     Date Of Birth          1985        Visit Information        Provider Department      6/4/2018 3:50 PM Rosa Mistry MD [facility]        Today's Diagnoses     Meet and greet for expectant mother    -  1       Follow-ups after your visit        Your next 10 appointments already scheduled     Jun 07, 2018  3:45 PM CDT   ESTABLISHED PRENATAL with Wen Laurent DO   [facility]    [address] [place] [address] [phone]            Sree 15, 2018  3:30 PM CDT   ESTABLISHED PRENATAL with Sneha Guajardo MD   [facility]    [address] [place] [address] [phone]              Who to contact     If you have questions or need follow up information about today's clinic visit or your schedule please contact [facility] directly at [phone].  Normal or non-critical lab and imaging results will be communicated to you by MyChart, letter or phone within 4 business days after the clinic has received the results. If you do not hear from us within 7 days, please contact the clinic through "[employer]hart or phone. If you have a critical or abnormal lab result, we will notify you by phone as soon as possible.  Submit refill requests through [employer] or call your pharmacy and they will forward the refill request to us. Please allow 3 business days for your refill to be completed.          Additional Information About Your Visit        MyChart Information     [employer] gives you secure access to your electronic health record. If you see a primary care provider, you can also send messages to your care team and make appointments. If you have questions, please call your primary care clinic.  If you do not have a primary care  provider, please call [phone] and they will assist you.      [employer] is an electronic gateway that provides easy, online access to your medical records. With [employer], you can request a clinic appointment, read your test results, renew a prescription or communicate with your care team.     To access your existing account, please contact your [facility] or call [phone] for assistance.        Care EveryWhere ID     This is your Care EveryWhere ID. This could be used by other organizations to access your [place] medical records  [id number]        Your Vitals Were     Last Period                   09/18/2017            Blood Pressure from Last 3 Encounters:   05/31/18 112/66   05/24/18 104/69   05/18/18 105/68    Weight from Last 3 Encounters:   05/31/18 152 lb 6.4 oz (69.1 kg)   05/24/18 150 lb 3.2 oz (68.1 kg)   05/18/18 148 lb 12.8 oz (67.5 kg)              Today, you had the following     No orders found for display       Primary Care Provider Office Phone # Fax #    [facility] [phone]       [address]  [facility] [address]        Equal Access to Services     ALFONSO SHERMAN : Hadii aad ku hadasho Soomaali, waaxda luqadaha, qaybta kaalmada adeegyada, nicole farrar hayveronican memo weaver. So [facility] [phone].    ATENCIÓN: Si habla español, tiene a dahl disposición servicios gratuitos de asistencia lingüística. Llame al [phone].    We comply with applicable federal civil rights laws and [place] laws. We do not discriminate on the basis of race, color, national origin, age, disability, sex, sexual orientation, or gender identity.            Thank you!     Thank you for choosing [facility]  for your care. Our goal is always to provide you with excellent care. Hearing back from our patients is one way we can continue to improve our services. Please take a few minutes to complete the written survey that  you may receive in the mail after your visit with us. Thank you!             Your Updated Medication List - Protect others around you: Learn how to safely use, store and throw away your medicines at www.disposemymeds.org.          This list is accurate as of 6/4/18 11:59 PM.  Always use your most recent med list.                   Brand Name Dispense Instructions for use Diagnosis    order for DME     1 pump    Equipment being ordered: Breast Pump    Lactation problem       prenatal multivitamin plus iron 27-0.8 MG Tabs per tablet     100 tablet    Take 1 tablet by mouth daily    Family planning counseling

## 2018-06-06 NOTE — PROGRESS NOTES
Prenatal Visit  Prenatal visit with mom and dad.  First baby.  Will deliver at [facility] [place].   Normal pregnancy with no complications   Discussed with parents routine  care, office hours and policies.

## 2018-06-07 ENCOUNTER — PRENATAL OFFICE VISIT (OUTPATIENT)
Dept: OBGYN | Facility: CLINIC | Age: 33
End: 2018-06-07
Payer: COMMERCIAL

## 2018-06-07 VITALS
BODY MASS INDEX: 26.91 KG/M2 | HEART RATE: 89 BPM | WEIGHT: 153.1 LBS | DIASTOLIC BLOOD PRESSURE: 69 MMHG | SYSTOLIC BLOOD PRESSURE: 114 MMHG | OXYGEN SATURATION: 96 %

## 2018-06-07 DIAGNOSIS — Z34.83 ENCOUNTER FOR SUPERVISION OF OTHER NORMAL PREGNANCY, THIRD TRIMESTER: Primary | ICD-10-CM

## 2018-06-07 PROCEDURE — 99207 ZZC PRENATAL VISIT: CPT | Performed by: OBSTETRICS & GYNECOLOGY

## 2018-06-07 NOTE — PROGRESS NOTES
She reports feeling reassuring daily fetal activity and will continue to record.  She denies any fluid leakage or regular uterine contractions.  She gained 1 lb since her last visit and understands that her GBS status is +.  She declined a cervical exam even though it was advised.

## 2018-06-07 NOTE — MR AVS SNAPSHOT
After Visit Summary   6/7/2018    Lou Rivers    MRN: [id number]           Patient Information     Date Of Birth          1985        Visit Information        Provider Department      6/7/2018 3:45 PM Wen Laurent,  [facility]         Follow-ups after your visit        Your next 10 appointments already scheduled     Sree 15, 2018  3:30 PM CDT   ESTABLISHED PRENATAL with Sneha Guajardo MD   [facility] ([facility])    [address] [place] [address] [phone]              Who to contact     If you have questions or need follow up information about today's clinic visit or your schedule please contact [facility] directly at [phone].  Normal or non-critical lab and imaging results will be communicated to you by MyChart, letter or phone within 4 business days after the clinic has received the results. If you do not hear from us within 7 days, please contact the clinic through MyChart or phone. If you have a critical or abnormal lab result, we will notify you by phone as soon as possible.  Submit refill requests through [employer] or call your pharmacy and they will forward the refill request to us. Please allow 3 business days for your refill to be completed.          Additional Information About Your Visit        MyChart Information     [employer] gives you secure access to your electronic health record. If you see a primary care provider, you can also send messages to your care team and make appointments. If you have questions, please call your primary care clinic.  If you do not have a primary care provider, please call [phone] and they will assist you.        Care EveryWhere ID     This is your Care EveryWhere ID. This could be used by other organizations to access your [place] medical records  [id number]        Your Vitals Were     Pulse Last Period Pulse Oximetry BMI (Body Mass Index)           89 09/18/2017 96% 26.91 kg/m2         Blood Pressure from Last 3 Encounters:   06/07/18 114/69   05/31/18 112/66   05/24/18 104/69    Weight from Last 3 Encounters:   06/07/18 153 lb 1.6 oz (69.4 kg)   05/31/18 152 lb 6.4 oz (69.1 kg)   05/24/18 150 lb 3.2 oz (68.1 kg)              Today, you had the following     No orders found for display       Primary Care Provider Office Phone # Fax #    [facility] [phone]       [address]  [facility] [address]        Equal Access to Services     ALFONSO SHERMAN : Hadii wiliam penao Solisha, waaxda luqadaha, qaybta kaalmada adecourtneyyada, nicole weaver. So [facility] [phone].    ATENCIÓN: Si habla español, tiene a dahl disposición servicios gratuitos de asistencia lingüística. Llame al [phone].    We comply with applicable federal civil rights laws and [place] laws. We do not discriminate on the basis of race, color, national origin, age, disability, sex, sexual orientation, or gender identity.            Thank you!     Thank you for choosing [facility]  for your care. Our goal is always to provide you with excellent care. Hearing back from our patients is one way we can continue to improve our services. Please take a few minutes to complete the written survey that you may receive in the mail after your visit with us. Thank you!             Your Updated Medication List - Protect others around you: Learn how to safely use, store and throw away your medicines at www.disposemymeds.org.          This list is accurate as of 6/7/18  4:23 PM.  Always use your most recent med list.                   Brand Name Dispense Instructions for use Diagnosis    order for DME     1 pump    Equipment being ordered: Breast Pump    Lactation problem       prenatal multivitamin plus iron 27-0.8 MG Tabs per tablet     100 tablet    Take 1 tablet by mouth daily    Family planning counseling

## 2018-06-15 ENCOUNTER — PRENATAL OFFICE VISIT (OUTPATIENT)
Dept: OBGYN | Facility: CLINIC | Age: 33
End: 2018-06-15
Payer: COMMERCIAL

## 2018-06-15 VITALS
SYSTOLIC BLOOD PRESSURE: 114 MMHG | DIASTOLIC BLOOD PRESSURE: 69 MMHG | WEIGHT: 155.9 LBS | BODY MASS INDEX: 27.4 KG/M2 | HEART RATE: 104 BPM

## 2018-06-15 DIAGNOSIS — Z34.83 ENCOUNTER FOR SUPERVISION OF OTHER NORMAL PREGNANCY, THIRD TRIMESTER: Primary | ICD-10-CM

## 2018-06-15 PROCEDURE — 99207 ZZC PRENATAL VISIT: CPT | Performed by: OBSTETRICS & GYNECOLOGY

## 2018-06-15 NOTE — MR AVS SNAPSHOT
After Visit Summary   6/15/2018    Lou Rivers    MRN: [id number]           Patient Information     Date Of Birth          1985        Visit Information        Provider Department      6/15/2018 3:30 PM Sneha Guajardo MD [facility]        Today's Diagnoses     Encounter for supervision of other normal pregnancy, third trimester    -  1       Follow-ups after your visit        Follow-up notes from your care team     Return in about 1 week (around 6/22/2018) for OB Visit.      Who to contact     If you have questions or need follow up information about today's clinic visit or your schedule please contact [facility] directly at [phone].  Normal or non-critical lab and imaging results will be communicated to you by MyChart, letter or phone within 4 business days after the clinic has received the results. If you do not hear from us within 7 days, please contact the clinic through SiConnecthart or phone. If you have a critical or abnormal lab result, we will notify you by phone as soon as possible.  Submit refill requests through Startupi or call your pharmacy and they will forward the refill request to us. Please allow 3 business days for your refill to be completed.          Additional Information About Your Visit        MyChart Information     Startupi gives you secure access to your electronic health record. If you see a primary care provider, you can also send messages to your care team and make appointments. If you have questions, please call your primary care clinic.  If you do not have a primary care provider, please call [phone] and they will assist you.        Care EveryWhere ID     This is your Care EveryWhere ID. This could be used by other organizations to access your [place] medical records  [id number]        Your Vitals Were     Pulse Last Period BMI (Body Mass Index)             104 09/18/2017 27.4 kg/m2          Blood Pressure from Last 3  Encounters:   06/15/18 114/69   06/07/18 114/69   05/31/18 112/66    Weight from Last 3 Encounters:   06/15/18 155 lb 14.4 oz (70.7 kg)   06/07/18 153 lb 1.6 oz (69.4 kg)   05/31/18 152 lb 6.4 oz (69.1 kg)              Today, you had the following     No orders found for display       Primary Care Provider Office Phone # Fax #    [place] [phone] [address]  [facility] [address]        Equal Access to Services     TARIK [facility] System[facility]LAIR : Hadii aad ku hadasho Sochelseaali, waaxda luqadaha, qaybta kaalmada adeegyada, nicole vargas . So [facility] [phone].    ATENCIÓN: Si habla español, tiene a dahl disposición servicios gratuitos de asistencia lingüística. Llame al [phone].    We comply with applicable federal civil rights laws and [place] laws. We do not discriminate on the basis of race, color, national origin, age, disability, sex, sexual orientation, or gender identity.            Thank you!     Thank you for choosing [facility]  for your care. Our goal is always to provide you with excellent care. Hearing back from our patients is one way we can continue to improve our services. Please take a few minutes to complete the written survey that you may receive in the mail after your visit with us. Thank you!             Your Updated Medication List - Protect others around you: Learn how to safely use, store and throw away your medicines at www.disposemymeds.org.          This list is accurate as of 6/15/18  3:49 PM.  Always use your most recent med list.                   Brand Name Dispense Instructions for use Diagnosis    order for DME     1 pump    Equipment being ordered: Breast Pump    Lactation problem       prenatal multivitamin plus iron 27-0.8 MG Tabs per tablet     100 tablet    Take 1 tablet by mouth daily    Family planning counseling

## 2018-06-15 NOTE — NURSING NOTE
"Chief Complaint   Patient presents with     Prenatal Care       Initial /69 (BP Location: Right arm, Patient Position: Chair, Cuff Size: Adult Regular)  Pulse 104  Wt 155 lb 14.4 oz (70.7 kg)  LMP 2017  BMI 27.4 kg/m2 Estimated body mass index is 27.4 kg/(m^2) as calculated from the following:    Height as of 3/13/18: 5' 3.25\" (1.607 m).    Weight as of this encounter: 155 lb 14.4 oz (70.7 kg).  BP completed using cuff size: regular        Ann Perales, [facility]  Micaela 15, 2018          "

## 2018-06-15 NOTE — PROGRESS NOTES
Presents for routine  appointment.     No complaints.    No LOF/VB/Ctxs.    ROS:   and GI  negative.     Please see Prenatal Vitals and Notes Flowsheet for objective data.    Lab Results   Component Value Date    GBS Positive (A) 2018       A/P:  33 year old  at 38w4d       ICD-10-CM    1. Encounter for supervision of other normal pregnancy, third trimester Z34.83        Labor precautions given   Follow up in 1 week.      Sneha Guajardo MD

## 2018-06-22 ENCOUNTER — PRENATAL OFFICE VISIT (OUTPATIENT)
Dept: OBGYN | Facility: CLINIC | Age: 33
End: 2018-06-22
Payer: COMMERCIAL

## 2018-06-22 VITALS
HEART RATE: 103 BPM | SYSTOLIC BLOOD PRESSURE: 118 MMHG | DIASTOLIC BLOOD PRESSURE: 72 MMHG | WEIGHT: 156.7 LBS | BODY MASS INDEX: 27.54 KG/M2 | OXYGEN SATURATION: 96 %

## 2018-06-22 DIAGNOSIS — O48.1 PROLONGED PREGNANCY, ANTEPARTUM: Primary | ICD-10-CM

## 2018-06-22 PROCEDURE — 99207 ZZC PRENATAL VISIT: CPT | Performed by: OBSTETRICS & GYNECOLOGY

## 2018-06-22 NOTE — PROGRESS NOTES
She reports feeling reassuring daily fetal activity and will continue to record.  She gained 1 lb since her last visit and denies any fluid leakage or regular uterine contractions.  She understands that she needs a cervical exam but continues to refuse.  Her GBS strep status is + so she understands that she will need IV antibiotic in labor.  Will order a BPP for her next appt in 1 week since she will be postdates.  She and her  voiced understanding.  She will also need a NST at her next appt if she remains undelivered.

## 2018-06-22 NOTE — MR AVS SNAPSHOT
After Visit Summary   6/22/2018    Lou Rivers    MRN: [id number]           Patient Information     Date Of Birth          1985        Visit Information        Provider Department      6/22/2018 12:00 PM Wen Laurent DO [facility]        Care Instructions                                                         If you have any questions regarding your visit, Please contact your care team.    [facility] CLINIC HOURS TELEPHONE NUMBER   Wen Laurent DO.    AVELINA [facility] -    KENNETH Mahoney       Monday, Wednesday, [facility]  8:30a.m-5:00 p.m   [facility]  [address] [place] [address]  [phone] ask for [facility]    Imaging Kxsrnabbrb-[phone]       Urgent Care locations:    [facility] Saturday and Sunday   9 am - 5 pm    Monday-Friday   12 pm - 8 pm  Saturday and Sunday   9 am - 5 pm   [phone]     [facility] Labor and Delivery:  [phone]    If you need a medication refill, please contact your pharmacy. Please allow 3 business days for your refill to be completed.  As always, Thank you for trusting us with your healthcare needs!                Follow-ups after your visit        Who to contact     If you have questions or need follow up information about today's clinic visit or your schedule please contact [facility] directly at [phone].  Normal or non-critical lab and imaging results will be communicated to you by MyChart, letter or phone within 4 business days after the clinic has received the results. If you do not hear from us within 7 days, please contact the clinic through MyChart or phone. If you have a critical or abnormal lab result, we will notify you by phone as soon as possible.  Submit refill requests through [facility] ([facility]) or call your pharmacy and they will forward the refill request to us.  Please allow 3 business days for your refill to be completed.          Additional Information About Your Visit        MyChart Information     Hypereighthart gives you secure access to your electronic health record. If you see a primary care provider, you can also send messages to your care team and make appointments. If you have questions, please call your primary care clinic.  If you do not have a primary care provider, please call [phone] and they will assist you.        Care EveryWhere ID     This is your Care EveryWhere ID. This could be used by other organizations to access your [place] medical records  [id number]        Your Vitals Were     Pulse Last Period Pulse Oximetry Breastfeeding? BMI (Body Mass Index)       103 09/18/2017 96% No 27.54 kg/m2        Blood Pressure from Last 3 Encounters:   06/22/18 118/72   06/15/18 114/69   06/07/18 114/69    Weight from Last 3 Encounters:   06/22/18 156 lb 11.2 oz (71.1 kg)   06/15/18 155 lb 14.4 oz (70.7 kg)   06/07/18 153 lb 1.6 oz (69.4 kg)              Today, you had the following     No orders found for display       Primary Care Provider Office Phone # Fax #    [facility] [phone]       [address]  [facility] [address]        Equal Access to Services     ALFONSO SHERMAN : Hadii aad ku hadasho Soomaali, waaxda luqadaha, qaybta kaalmada adeegyada, waxjuan carlos idiin hayaan memo vargas . So [facility] [phone].    ATENCIÓN: Si habla español, tiene a dahl disposición servicios gratuitos de asistencia lingüística. Llame al [phone].    We comply with applicable federal civil rights laws and [place] laws. We do not discriminate on the basis of race, color, national origin, age, disability, sex, sexual orientation, or gender identity.            Thank you!     Thank you for choosing [facility]  for your care. Our goal is always to provide you with excellent care. Hearing back from our patients is one  way we can continue to improve our services. Please take a few minutes to complete the written survey that you may receive in the mail after your visit with us. Thank you!             Your Updated Medication List - Protect others around you: Learn how to safely use, store and throw away your medicines at www.disposemymeds.org.          This list is accurate as of 6/22/18 12:03 PM.  Always use your most recent med list.                   Brand Name Dispense Instructions for use Diagnosis    order for DME     1 pump    Equipment being ordered: Breast Pump    Lactation problem       prenatal multivitamin plus iron 27-0.8 MG Tabs per tablet     100 tablet    Take 1 tablet by mouth daily    Family planning counseling

## 2018-06-26 ENCOUNTER — RADIANT APPOINTMENT (OUTPATIENT)
Dept: ULTRASOUND IMAGING | Facility: CLINIC | Age: 33
End: 2018-06-26
Attending: OBSTETRICS & GYNECOLOGY
Payer: COMMERCIAL

## 2018-06-26 ENCOUNTER — PRENATAL OFFICE VISIT (OUTPATIENT)
Dept: OBGYN | Facility: CLINIC | Age: 33
End: 2018-06-26
Payer: COMMERCIAL

## 2018-06-26 VITALS
SYSTOLIC BLOOD PRESSURE: 120 MMHG | HEART RATE: 97 BPM | WEIGHT: 157.6 LBS | DIASTOLIC BLOOD PRESSURE: 76 MMHG | OXYGEN SATURATION: 98 % | BODY MASS INDEX: 27.7 KG/M2

## 2018-06-26 DIAGNOSIS — O48.1 PROLONGED PREGNANCY, ANTEPARTUM: ICD-10-CM

## 2018-06-26 DIAGNOSIS — O48.0 POST-TERM PREGNANCY, 40-42 WEEKS OF GESTATION: Primary | ICD-10-CM

## 2018-06-26 PROCEDURE — 59025 FETAL NON-STRESS TEST: CPT | Performed by: OBSTETRICS & GYNECOLOGY

## 2018-06-26 PROCEDURE — 99207 ZZC PRENATAL VISIT: CPT | Performed by: OBSTETRICS & GYNECOLOGY

## 2018-06-26 PROCEDURE — 76819 FETAL BIOPHYS PROFIL W/O NST: CPT | Performed by: STUDENT IN AN ORGANIZED HEALTH CARE EDUCATION/TRAINING PROGRAM

## 2018-06-26 NOTE — MR AVS SNAPSHOT
After Visit Summary   6/26/2018    Lou Rievrs    MRN: [id number]           Patient Information     Date Of Birth          1985        Visit Information        Provider Department      6/26/2018 9:00 AM Irma Calhoun,  [facility]        Today's Diagnoses     Post-term pregnancy, 40-42 weeks of gestation    -  1      Care Instructions    What to watch out for are: regular contractions every 5 min, vaginal bleeding, decreased fetal movement, or leakage of fluid.  Please call the office or go to L&D if you develop any of these signs and symptoms.      I will see you for your follow up appointment.  Please feel free to call if you have any questions or concerns.      Thanks,  Irma Calhoun, DO            Follow-ups after your visit        Follow-up notes from your care team     Return in about 2 days (around 6/28/2018).      Your next 10 appointments already scheduled     Jun 27, 2018  4:20 PM CDT   US FETAL BIOPHYS PROFILE W/O NON STRESS TEST with MGUS1, MG [facility]   [facility]    [address] [place] [address] [phone]           Please bring a list of your medicines (including vitamins, minerals and over-the-counter drugs). Also, tell your doctor about any allergies you may have. Wear comfortable clothes and leave your valuables at home.  If you re less than 20 weeks drink four 8-ounce glasses of fluid an hour before your exam. If you need to empty your bladder before your exam, try to release only a little urine. Then, drink another glass of fluid.  You may have up to two family members in the exam room. If you bring a small child, an adult must be there to care for him or her.  Please call the Imaging Department at your exam site with any questions.            Jun 29, 2018 11:30 AM CDT   ESTABLISHED PRENATAL with Wen Laurent DO   [facility]     [address] [place] [address] [phone]              Future tests that were ordered for you today     Open Future Orders        Priority Expected Expires Ordered    US Fetal Biophys Prof w/o Non Stress Test Routine  6/29/2018 6/26/2018            Who to contact     If you have questions or need follow up information about today's clinic visit or your schedule please contact [facility] directly at [phone].  Normal or non-critical lab and imaging results will be communicated to you by ID.mehart, letter or phone within 4 business days after the clinic has received the results. If you do not hear from us within 7 days, please contact the clinic through InsideView or phone. If you have a critical or abnormal lab result, we will notify you by phone as soon as possible.  Submit refill requests through InsideView or call your pharmacy and they will forward the refill request to us. Please allow 3 business days for your refill to be completed.          Additional Information About Your Visit        InsideView Information     InsideView gives you secure access to your electronic health record. If you see a primary care provider, you can also send messages to your care team and make appointments. If you have questions, please call your primary care clinic.  If you do not have a primary care provider, please call [phone] and they will assist you.        Care EveryWhere ID     This is your Care EveryWhere ID. This could be used by other organizations to access your [place] medical records  [id number]        Your Vitals Were     Pulse Last Period Pulse Oximetry BMI (Body Mass Index)          97 09/18/2017 98% 27.7 kg/m2         Blood Pressure from Last 3 Encounters:   06/26/18 120/76   06/22/18 118/72   06/15/18 114/69    Weight from Last 3 Encounters:   06/26/18 157 lb 9.6 oz (71.5 kg)   06/22/18 156 lb 11.2 oz (71.1 kg)   06/15/18 155 lb 14.4 oz (70.7 kg)              We Performed the  Following     FETAL NON-STRESS TEST        Primary Care Provider Office Phone # Fax #    [facility] [phone] [address]  [facility] [address]        Equal Access to Services     ALFONSO SHERMAN : Hadeli wiliam ku jeano Sochelseaali, waaxda luqadaha, qaybta kaalmada adeegyada, nicole rojon memo dsouza sam weaver. So [facility] [phone].    ATENCIÓN: Si habla español, tiene a dahl disposición servicios gratuitos de asistencia lingüística. Llame al [phone].    We comply with applicable federal civil rights laws and [place] laws. We do not discriminate on the basis of race, color, national origin, age, disability, sex, sexual orientation, or gender identity.            Thank you!     Thank you for choosing [facility]  for your care. Our goal is always to provide you with excellent care. Hearing back from our patients is one way we can continue to improve our services. Please take a few minutes to complete the written survey that you may receive in the mail after your visit with us. Thank you!             Your Updated Medication List - Protect others around you: Learn how to safely use, store and throw away your medicines at www.disposemymeds.org.          This list is accurate as of 6/26/18 10:28 AM.  Always use your most recent med list.                   Brand Name Dispense Instructions for use Diagnosis    order for DME     1 pump    Equipment being ordered: Breast Pump    Lactation problem       prenatal multivitamin plus iron 27-0.8 MG Tabs per tablet     100 tablet    Take 1 tablet by mouth daily    Family planning counseling

## 2018-06-26 NOTE — PATIENT INSTRUCTIONS
What to watch out for are: regular contractions every 5 min, vaginal bleeding, decreased fetal movement, or leakage of fluid.  Please call the office or go to L&D if you develop any of these signs and symptoms.      I will see you for your follow up appointment.  Please feel free to call if you have any questions or concerns.      Thanks,  Irma Calhoun, DO

## 2018-06-26 NOTE — PROGRESS NOTES
33 year old  at 40w1d weeks presents to the clinic for a routine prenatal visit.  No concerns.  Complains of feeling more pressure.  No vaginal bleeding, leakage of fluid, or contractions   Fundal height=39cm  JHSf=583's by NST  BPP=6/8  CX=Cl/50/-1 however very difficult exam due to patient's discomfort with exam  Discussed labor precautions  RTC this week for another BPP  GBS=Positive    Postdates  NST IS:  Reactive (2 accl > 15 BPM in 20 min., each lasting approx. 15 seconds)  NST Baseline Rate 130's  Variability:  Average  Accelerations:Present  Variable Decelerations:No  Other Decelerations:No  Contractions: None      Discussed with Mythiendi the risks, benefits and alternatives to induction.  We discussed cervical ripening for those patients with a pierson score of less than 6 (for multiparous) and 8 (for nulliparous).  Discussed the concerns related to uterine hyperstimulation and adverse fetal effects that can occur with a ripening agent or pitocin. Discussed amniotomy and the rationale for it with induction.  I discussed the expected timeline for her based on her presentation, but she understands that this is just an approximate.  She is given the opportunity to ask questions and have them answered.  She does wish to proceed    Induction scheduled for .        Irma Calhoun

## 2018-06-27 ENCOUNTER — RADIANT APPOINTMENT (OUTPATIENT)
Dept: ULTRASOUND IMAGING | Facility: CLINIC | Age: 33
End: 2018-06-27
Attending: OBSTETRICS & GYNECOLOGY
Payer: COMMERCIAL

## 2018-06-27 DIAGNOSIS — O48.0 POST-TERM PREGNANCY, 40-42 WEEKS OF GESTATION: ICD-10-CM

## 2018-06-27 PROCEDURE — 76819 FETAL BIOPHYS PROFIL W/O NST: CPT | Performed by: STUDENT IN AN ORGANIZED HEALTH CARE EDUCATION/TRAINING PROGRAM

## 2018-06-29 ENCOUNTER — PRENATAL OFFICE VISIT (OUTPATIENT)
Dept: OBGYN | Facility: CLINIC | Age: 33
End: 2018-06-29
Payer: COMMERCIAL

## 2018-06-29 ENCOUNTER — MYC REFILL (OUTPATIENT)
Dept: PEDIATRICS | Facility: CLINIC | Age: 33
End: 2018-06-29

## 2018-06-29 DIAGNOSIS — Z30.09 FAMILY PLANNING COUNSELING: ICD-10-CM

## 2018-06-29 DIAGNOSIS — O48.1 PROLONGED PREGNANCY, ANTEPARTUM: Primary | ICD-10-CM

## 2018-06-29 PROCEDURE — 59025 FETAL NON-STRESS TEST: CPT | Performed by: OBSTETRICS & GYNECOLOGY

## 2018-06-29 PROCEDURE — 99207 ZZC PRENATAL VISIT: CPT | Performed by: OBSTETRICS & GYNECOLOGY

## 2018-06-29 RX ORDER — PRENATAL VIT/IRON FUM/FOLIC AC 27MG-0.8MG
1 TABLET ORAL DAILY
Qty: 100 TABLET | Refills: 3 | Status: CANCELLED | OUTPATIENT
Start: 2018-06-29

## 2018-06-29 NOTE — TELEPHONE ENCOUNTER
Pending Prescriptions:                       Disp   Refills    Prenatal Vit-Fe Fumarate-FA (PRENATAL MUL*100 ta*3            Sig: Take 1 tablet by mouth daily    Called pharmacy and verified patient has refills remaining. [facility].  Quintin Arevalo, CMA

## 2018-06-29 NOTE — TELEPHONE ENCOUNTER
Prenatal Vit-Fe Fumarate-FA (PRENATAL MULTIVITAMIN PLUS IRON) 27-0.8 MG TABS per tablet      Last Written Prescription Date:  09/01/17  Last Fill Quantity: 100,   # refills: 3  Last Office Visit: 10/27/17  Future Office visit:

## 2018-06-29 NOTE — MR AVS SNAPSHOT
After Visit Summary   6/29/2018    Lou Rivers    MRN: [id number]           Patient Information     Date Of Birth          1985        Visit Information        Provider Department      6/29/2018 11:30 AM Wen Laurent DO [facility] [place] [facility]        Care Instructions                                                         If you have any questions regarding your visit, Please contact your care team.    [facility] CLINIC HOURS TELEPHONE NUMBER   Wen Laurent DO.    AVELINA [facility] -    KENNETH Mahoney       [facility], Wednesday[facility]  8:30a.m-5:00 p.m   [facility]  [address] [place] [address]  [phone] ask for [facility]    Imaging Adhekktnso-[phone]       Urgent Care locations:    [facility] Saturday and Sunday   9 am - 5 pm    Monday-Friday   12 pm - 8 pm  Saturday and Sunday   9 am - 5 pm   [phone]     [facility] Labor and Delivery:  [phone]    If you need a medication refill, please contact your pharmacy. Please allow 3 business days for your refill to be completed.  As always, Thank you for trusting us with your healthcare needs!                Follow-ups after your visit        Who to contact     If you have questions or need follow up information about today's clinic visit or your schedule please contact [facility] directly at [phone].  Normal or non-critical lab and imaging results will be communicated to you by MyChart, letter or phone within 4 business days after the clinic has received the results. If you do not hear from us within 7 days, please contact the clinic through MyChart or phone. If you have a critical or abnormal lab result, we will notify you by phone as soon as possible.  Submit refill requests through [employer] or call your pharmacy and they will forward the refill request to us.  Please allow 3 business days for your refill to be completed.          Additional Information About Your Visit        MyChart Information     Huniehart gives you secure access to your electronic health record. If you see a primary care provider, you can also send messages to your care team and make appointments. If you have questions, please call your primary care clinic.  If you do not have a primary care provider, please call [phone] and they will assist you.        Care EveryWhere ID     This is your Care EveryWhere ID. This could be used by other organizations to access your [place] medical records  [id number]        Your Vitals Were     Pulse Last Period Pulse Oximetry BMI (Body Mass Index)          86 09/18/2017 95% 27.73 kg/m2         Blood Pressure from Last 3 Encounters:   06/29/18 118/79   06/26/18 120/76   06/22/18 118/72    Weight from Last 3 Encounters:   06/29/18 157 lb 12.8 oz (71.6 kg)   06/26/18 157 lb 9.6 oz (71.5 kg)   06/22/18 156 lb 11.2 oz (71.1 kg)              Today, you had the following     No orders found for display       Primary Care Provider Office Phone # Fax #    [facility] [phone]       [address]  [facility] [address]        Equal Access to Services     ALFONSO SHERMAN AH: Hadii aad ku hadasho Soomaali, waaxda luqadaha, qaybta kaalmada adeegyada, waxay idiin hayaan memo khjuancarlos weaver. So [facility] [phone].    ATENCIÓN: Si habla español, tiene a dahl disposición servicios gratuitos de asistencia lingüística. Llame al [phone].    We comply with applicable federal civil rights laws and [place] laws. We do not discriminate on the basis of race, color, national origin, age, disability, sex, sexual orientation, or gender identity.            Thank you!     Thank you for choosing [facility]  for your [place]. Our goal is always to provide you with excellent care. Hearing back from our patients is one way we can  continue to improve our services. Please take a few minutes to complete the written survey that you may receive in the mail after your visit with us. Thank you!             Your Updated Medication List - Protect others around you: Learn how to safely use, store and throw away your medicines at www.disposemymeds.org.          This list is accurate as of 6/29/18 11:46 AM.  Always use your most recent med list.                   Brand Name Dispense Instructions for use Diagnosis    order for DME     1 pump    Equipment being ordered: Breast Pump    Lactation problem       prenatal multivitamin plus iron 27-0.8 MG Tabs per tablet     100 tablet    Take 1 tablet by mouth daily    Family planning counseling

## 2018-06-29 NOTE — TELEPHONE ENCOUNTER
Message from MyChart:  Original authorizing provider: LAINEY Jang CNP    Lou Rivers would like a refill of the following medications:  Prenatal Vit-Fe Fumarate-FA (PRENATAL MULTIVITAMIN PLUS IRON) 27-0.8 MG TABS per tablet [LAINEY Jang CNP]    Preferred pharmacy: [facility] [address], [address]    Comment:

## 2018-06-29 NOTE — TELEPHONE ENCOUNTER
Message from MyChart:  Original authorizing provider: LAINEY Jang CNP    Lou Rivers would like a refill of the following medications:  Prenatal Vit-Fe Fumarate-FA (PRENATAL MULTIVITAMIN PLUS IRON) 27-0.8 MG TABS per tablet [LAINEY Jang CNP]    Preferred pharmacy: [facility] [address], [address]    Comment:

## 2018-06-29 NOTE — PATIENT INSTRUCTIONS
If you have any questions regarding your visit, Please contact your care team.    Women s Health CLINIC HOURS TELEPHONE NUMBER   Wen Laurent DO.    AVELINA Hoover -    KENNETH Mahoney       Monday, Wednesday, Thursday and Friday, [place]  8:30a.m-5:00 p.m   [facility]  [address] [place] [address]  [phone] ask for [facility]    Imaging [phone]       Urgent Care locations:    [facility] Saturday and Sunday   9 am - 5 pm    Monday-Friday   12 pm - 8 pm  Saturday and Sunday   9 am - 5 pm   [phone]     [facility] Labor and Delivery:  [phone]    If you need a medication refill, please contact your pharmacy. Please allow 3 business days for your refill to be completed.  As always, Thank you for trusting us with your healthcare needs!

## 2018-07-02 RX ORDER — PRENATAL VIT/IRON FUM/FOLIC AC 27MG-0.8MG
1 TABLET ORAL DAILY
Qty: 100 TABLET | Refills: 0 | Status: SHIPPED | OUTPATIENT
Start: 2018-07-02 | End: 2019-01-15

## 2018-07-08 VITALS
HEART RATE: 86 BPM | DIASTOLIC BLOOD PRESSURE: 79 MMHG | SYSTOLIC BLOOD PRESSURE: 118 MMHG | WEIGHT: 157.8 LBS | BODY MASS INDEX: 27.73 KG/M2 | OXYGEN SATURATION: 95 %

## 2018-08-13 ENCOUNTER — PRENATAL OFFICE VISIT (OUTPATIENT)
Dept: OBGYN | Facility: CLINIC | Age: 33
End: 2018-08-13
Payer: COMMERCIAL

## 2018-08-13 VITALS
DIASTOLIC BLOOD PRESSURE: 72 MMHG | WEIGHT: 141.6 LBS | SYSTOLIC BLOOD PRESSURE: 106 MMHG | OXYGEN SATURATION: 96 % | HEART RATE: 78 BPM | BODY MASS INDEX: 24.89 KG/M2

## 2018-08-13 PROCEDURE — 99207 ZZC POST PARTUM EXAM: CPT | Performed by: OBSTETRICS & GYNECOLOGY

## 2018-08-13 ASSESSMENT — PATIENT HEALTH QUESTIONNAIRE - PHQ9: 5. POOR APPETITE OR OVEREATING: NOT AT ALL

## 2018-08-13 ASSESSMENT — ANXIETY QUESTIONNAIRES
5. BEING SO RESTLESS THAT IT IS HARD TO SIT STILL: MORE THAN HALF THE DAYS
7. FEELING AFRAID AS IF SOMETHING AWFUL MIGHT HAPPEN: SEVERAL DAYS
3. WORRYING TOO MUCH ABOUT DIFFERENT THINGS: NOT AT ALL
1. FEELING NERVOUS, ANXIOUS, OR ON EDGE: SEVERAL DAYS
GAD7 TOTAL SCORE: 5
IF YOU CHECKED OFF ANY PROBLEMS ON THIS QUESTIONNAIRE, HOW DIFFICULT HAVE THESE PROBLEMS MADE IT FOR YOU TO DO YOUR WORK, TAKE CARE OF THINGS AT HOME, OR GET ALONG WITH OTHER PEOPLE: VERY DIFFICULT
6. BECOMING EASILY ANNOYED OR IRRITABLE: SEVERAL DAYS
2. NOT BEING ABLE TO STOP OR CONTROL WORRYING: NOT AT ALL

## 2018-08-13 NOTE — MR AVS SNAPSHOT
After Visit Summary   8/13/2018    Lou Rivers    MRN: [id number]           Patient Information     Date Of Birth          1985        Visit Information        Provider Department      8/13/2018 4:30 PM Wen Laurent DO [facility]        Care Instructions                                                         If you have any questions regarding your visit, Please contact your care team.    [facility] CLINIC HOURS TELEPHONE NUMBER   Wen Laurent DO.    AVELINA Hoover -    KENNETH Mahoney       Monday, Wednesday, [facility]  8:30a.m-5:00 p.m   [facility]  [address] [place] [address] [phone] ask for [facility]    Imaging [phone]       Urgent Care locations:    [facility] Saturday and Sunday   9 am - 5 pm    Monday-Friday   12 pm - 8 pm  Saturday and Sunday   9 am - 5 pm   [phone]     [facility] Labor and Delivery:  [phone]    If you need a medication refill, please contact your pharmacy. Please allow 3 business days for your refill to be completed.  As always, Thank you for trusting us with your healthcare needs!                Follow-ups after your visit        Your next 10 appointments already scheduled     Aug 14, 2018  4:30 PM CDT   PHYSICAL with ALINEY Jang CNP   [facility]    [id number] [place] [address] Avenue [facility] [address] [phone]              Who to contact     If you have questions or need follow up information about today's clinic visit or your schedule please contact [facility] directly at [phone].  Normal or non-critical lab and imaging results will be communicated to you by MyChart, letter or phone within 4 business days after the clinic has received the results. If you do not hear from us within 7 days,  please contact the clinic through [email or web address] or phone. If you have a critical or abnormal lab result, we will notify you by phone as soon as possible.  Submit refill requests through [email or web address] or call your pharmacy and they will forward the refill request to us. Please allow 3 business days for your refill to be completed.          Additional Information About Your Visit        [facility] Information     [email or web address] gives you secure access to your electronic health record. If you see a primary care provider, you can also send messages to your care team and make appointments. If you have questions, please call your primary care clinic.  If you do not have a primary care provider, please call [phone] and they will assist you.        Care EveryWhere ID     This is your Care EveryWhere ID. This could be used by other organizations to access your [place] medical records  [id number]        Your Vitals Were     Pulse Last Period Pulse Oximetry Breastfeeding? BMI (Body Mass Index)       78 09/18/2017 96% Yes 24.89 kg/m2        Blood Pressure from Last 3 Encounters:   08/13/18 106/72   06/29/18 118/79   06/26/18 120/76    Weight from Last 3 Encounters:   08/13/18 141 lb 9.6 oz (64.2 kg)   06/29/18 157 lb 12.8 oz (71.6 kg)   06/26/18 157 lb 9.6 oz (71.5 kg)              Today, you had the following     No orders found for display         Today's Medication Changes          These changes are accurate as of 8/13/18  4:42 PM.  If you have any questions, ask your nurse or doctor.               Stop taking these medicines if you haven't already. Please contact your care team if you have questions.     order for DME   Stopped by:  Wen Laurent, DO                    Primary Care Provider Office Phone # Fax #    [facility] [phone] [address]  [facility] [address]        Equal Access to Services     ALFNOSO SHERMAN : yarelis Hernandez qaybta  nicole murraycourtney vargas ah. So [facility] [phone].    ATENCIÓN: Si anand wright, tiene a dahl disposición servicios gratuitos de asistencia lingüística. Phi al [phone].    We comply with applicable federal civil rights laws and [place] laws. We do not discriminate on the basis of race, color, national origin, age, disability, sex, sexual orientation, or gender identity.            Thank you!     Thank you for choosing [facility]  for your care. Our goal is always to provide you with excellent care. Hearing back from our patients is one way we can continue to improve our services. Please take a few minutes to complete the written survey that you may receive in the mail after your visit with us. Thank you!             Your Updated Medication List - Protect others around you: Learn how to safely use, store and throw away your medicines at www.disposemymeds.org.          This list is accurate as of 8/13/18  4:42 PM.  Always use your most recent med list.                   Brand Name Dispense Instructions for use Diagnosis    CALCIUM-MAGNESIUM-ZINC PO           prenatal multivitamin plus iron 27-0.8 MG Tabs per tablet     100 tablet    Take 1 tablet by mouth daily    Family planning counseling

## 2018-08-13 NOTE — PROGRESS NOTES
Lou is here for a postpartum checkup.  She is s/p  on 18 and denies any issues afterwards other than an external hemorrhoid.  She is breastfeeding her infant and does not want more children in the future.  However, she is not interested in sterilization.  Initially, she wanted the bcp, [facility].  But after discussing her other options, she changed her mind and requested an appt for Nexplanon insertion for contraception.    She had a   Obstetric History       T1      L1     SAB1   TAB0   Ectopic0   Multiple0   Live Births1       # Outcome Date GA Lbr Tyson/2nd Weight Sex Delivery Anes PTL Lv   2 Term 18 41w3d 20:00 / 02:30 7 lb 6 oz (3.345 kg) F  EPI,Local  LYNDA      Name: Sujatha      Apgar1:  7                Apgar5: 8   1 2015     SAB            Since delivery, she has been breast feeding.  She has not had a normal menses.  She has not had intercourse.  Patient screened for postpartum depression and complaints are NEGATIVE. Screening has also been completed for intimate partner violence. She would like to discuss her contraceptive options.    PE: /72  Pulse 78  Wt 141 lb 9.6 oz (64.2 kg)  LMP 2017  SpO2 96%  Breastfeeding? Yes  BMI 24.89 kg/m2  Body mass index is 24.89 kg/(m^2).    General Appearance:  healthy, alert, active, no distress  Cardiovascular:  Regular rate and Rhythm without murmur  Lungs:  Clear, without wheeze, rale or rhonchi  Abdomen: Benign, Soft, flat, non-tender, No masses, organomegaly, No inguinal nodes and Bowel sounds normoactive.   Pelvic:       - Ext: Vulva and perineum are normal without lesion, mass or discharge        - Bladder: no tenderness, no masses       - Vagina: Normal mucosa, no discharge, her 2nd-degree perineal laceration is healing well without complication        - Cervix: normal and multiparous       - Uterus:Normal shape, position and consistencyfirm, nontender, nongravid uterus without CMT       - Adnexa: Normal  without masses or tenderness       - Rectal: deferred but she does have a small external hemorrhoid so she will use Tucks or Preparation-H prn    A/P  Routine 6-Week Postpartum Exam     - I discussed the new pap recommendations regarding screening.  Explained the rationale for increased intervals between paps.  Questions asked and answered.  She does agree to this regiment.   - Pap was not performed today since not due until 4/2020   - Contraception: After reviewing her options, she would like to return for insertion of Nexplanon and this appt will be scheduled.  In the interim, she will use foam and condoms for birth control.  She is not interested in sterilization since she may change her mind regarding a second baby.  She is breastfeeding and plans to continue.      Wen Laurent, DO  FACOG, FACS

## 2018-08-13 NOTE — PATIENT INSTRUCTIONS
If you have any questions regarding your visit, Please contact your care team.    Women s Health CLINIC HOURS TELEPHONE NUMBER   Wen Laurent DO.    AVELINA Hoover -    KENNETH Mahoney       Monday, Wednesday, Thursday and Friday, [place]  8:30a.m-5:00 p.m   [facility]  [address] [place] [address]  [phone] ask for [facility]    Imaging [phone]       Urgent Care locations:    [facility] Saturday and Sunday   9 am - 5 pm    Monday-Friday   12 pm - 8 pm  Saturday and Sunday   9 am - 5 pm   [phone]     [facility] Labor and Delivery:  [phone]    If you need a medication refill, please contact your pharmacy. Please allow 3 business days for your refill to be completed.  As always, Thank you for trusting us with your healthcare needs!

## 2018-08-14 ENCOUNTER — OFFICE VISIT (OUTPATIENT)
Dept: PEDIATRICS | Facility: CLINIC | Age: 33
End: 2018-08-14
Payer: COMMERCIAL

## 2018-08-14 VITALS
BODY MASS INDEX: 25.45 KG/M2 | WEIGHT: 144.8 LBS | DIASTOLIC BLOOD PRESSURE: 80 MMHG | SYSTOLIC BLOOD PRESSURE: 110 MMHG | TEMPERATURE: 98.6 F | OXYGEN SATURATION: 96 % | HEART RATE: 82 BPM

## 2018-08-14 DIAGNOSIS — H93.13 TINNITUS, BILATERAL: ICD-10-CM

## 2018-08-14 DIAGNOSIS — N63.22 BREAST LUMP ON LEFT SIDE AT 10 O'CLOCK POSITION: Primary | ICD-10-CM

## 2018-08-14 PROCEDURE — 99214 OFFICE O/P EST MOD 30 MIN: CPT | Performed by: NURSE PRACTITIONER

## 2018-08-14 ASSESSMENT — ANXIETY QUESTIONNAIRES: GAD7 TOTAL SCORE: 5

## 2018-08-14 ASSESSMENT — PATIENT HEALTH QUESTIONNAIRE - PHQ9: SUM OF ALL RESPONSES TO PHQ QUESTIONS 1-9: 17

## 2018-08-14 NOTE — PROGRESS NOTES
SUBJECTIVE:   Lou Rivers is a 33 year old female who presents to clinic today for the following health issues:    Concern - breast lump  Onset: noticed lump during pregnancy     Description:   Pea size lump on the left side of breast. Patient notes noticed lump during her second or third trimester. Patient denies any pain, redness, or swelling. She is breast feeding and feels like she is not producing much milk in that breast.    Intensity: mild    Progression of Symptoms:  increased in size    Accompanying Signs & Symptoms:  Increased in size, buzzing in the ears    Previous history of similar problem:   none    Precipitating factors:   Worsened by: none    Alleviating factors:  Improved by: none    Therapies Tried and outcome: none    Complaint of buzzing in her ears for all of pregnancy   Not going away. Has been on and off     Problem list and histories reviewed & adjusted, as indicated.  Additional history: as documented    Patient Active Problem List   Diagnosis     Allergic rhinitis     Adjustment disorder with mixed anxiety and depressed mood     Supervision of other normal pregnancy, antepartum     Past Surgical History:   Procedure Laterality Date     LASIK BILATERAL Bilateral      SINUS SURGERY         Social History   Substance Use Topics     Smoking status: Never Smoker     Smokeless tobacco: Never Used     Alcohol use No     Family History   Problem Relation Age of Onset     Coronary Artery Disease Mother      Hypertension Mother      Other Cancer Mother      throat cancer     Other - See Comments Mother      neck problems     Thyroid Disease Mother      Coronary Artery Disease Father      Hypertension Father      Hyperlipidemia Father      Other - See Comments Father      herniated disc in lumbar     Gout Father      Liver Cancer Maternal Grandmother      Myocardial Infarction Maternal Grandfather      Diabetes No family hx of      Cerebrovascular Disease No family hx of      Breast Cancer No  family hx of      Colon Cancer No family hx of      Prostate Cancer No family hx of      Depression No family hx of      Anxiety Disorder No family hx of      Mental Illness No family hx of      Substance Abuse No family hx of      Anesthesia Reaction No family hx of      Asthma No family hx of      Osteoperosis No family hx of      Genetic Disorder No family hx of      Obesity No family hx of      Unknown/Adopted No family hx of          Current Outpatient Prescriptions   Medication Sig Dispense Refill     CALCIUM-MAGNESIUM-ZINC PO        Prenatal Vit-Fe Fumarate-FA (PRENATAL MULTIVITAMIN PLUS IRON) 27-0.8 MG TABS per tablet Take 1 tablet by mouth daily 100 tablet 0     Allergies   Allergen Reactions     Dust Mites      House dust     Labs reviewed in EPIC    Reviewed and updated as needed this visit by clinical staff  Tobacco  Allergies  Meds  Med Hx  Surg Hx  Fam Hx  Soc Hx      Reviewed and updated as needed this visit by Provider         ROS:  Constitutional, HEENT, cardiovascular, pulmonary, gi and gu systems are negative, except as otherwise noted.    OBJECTIVE:     /80 (BP Location: Right arm, Patient Position: Sitting, Cuff Size: Adult Regular)  Pulse 82  Temp 98.6  F (37  C) (Temporal)  Wt 144 lb 12.8 oz (65.7 kg)  LMP 09/18/2017  SpO2 96%  Breastfeeding? Yes  BMI 25.45 kg/m2  Body mass index is 25.45 kg/(m^2).  GENERAL APPEARANCE: healthy, alert and no distress  bilateral TM normal without fluid or infection, throat normal without erythema or exudate, sinuses nontender and post nasal drip noted  NECK: no adenopathy  RESP: lungs clear to auscultation - no rales, rhonchi or wheezes  BREAST:  Inspection negative. No nipple discharge or bleeding. No masses., negative findings: normal contour with no evidence of flattening or dimpling, skin normal, nipples everted without rashes or discharge, palpation negative for masses or nodules, no axillary masses palpable, positive findings: nodule 5  cm, smooth, soft and non-tender located left 10 o'clock  CV: regular rates and rhythm and no murmur, click or rub  MS: extremities normal- no gross deformities noted  SKIN: no suspicious lesions or rashes  PSYCH: mentation appears normal and affect normal/bright    Diagnostic Test Results:  Pending orders and results       ASSESSMENT/PLAN:     Lou was seen today for breast problem.    Diagnoses and all orders for this visit:    Breast lump on left side at 10 o'clock position  -     MA Diagnostic Digital Bilateral; Future  -     Cancel: US Breast Left Complete 4 Quadrants; Future  -     US Breast Left; Future    Tinnitus, bilateral  -     OTOLARYNGOLOGY REFERRAL    PLAN:   Patient needs to follow up in if no improvement,or sooner if worsening of symptoms or other symptoms develop.  FURTHER TESTING:       - mammogram  I will place order. Please call [phone] to schedule.  CONSULTATION/REFERRAL to Otolaryngology  Please call [phone] to make appointment  if you do not hear from referrals in the next few days.   See Patient Instructions    LAINEY Jang CNP  [facility]

## 2018-08-14 NOTE — PATIENT INSTRUCTIONS
PLAN:   1.   Orders Placed This Encounter   Procedures     MA Diagnostic Digital Bilateral     US Breast Left Complete 4 Quadrants     OTOLARYNGOLOGY REFERRAL       2. Patient needs to follow up in if no improvement,or sooner if worsening of symptoms or other symptoms develop.  FURTHER TESTING:       - mammogram  I will place order. Please call [phone] to schedule.  CONSULTATION/REFERRAL to Otolaryngology  Please call [phone] to make appointment  if you do not hear from referrals in the next few days.     It was a pleasure seeing you today at the [facility]. Thank you for allowing us to care for you today. We truly hope we provided you with the excellent service you deserve. Please let us know if there is anything else we can do for you so we can be sure you are leaving completley satisfied with your care experience.       General information about your clinic   Clinic Hours Lab Hours (Appointments are required)   Mon-Thurs: 7:30 AM - 7 PM Mon-Thurs: 7:30 AM - 7 PM   Fri: 7:30 AM - 5 PM Fri: 7:30 AM - 5 PM        After Hours Nurse Advise & Appts:  [facility]: [phone]  [facility] On Call: to make appointments anytime: [phone] On Call Physician: call [phone] and answering service will page the on call physician.        For urgent appointments, please call [phone] and ask for the triage nurse or your care team clinic nurse.  How to contact my care team:  MyChart: [email or web address]   Phone: [phone]   Fax: [phone]       [facility]:   Phone: [phone]  Hours: 8:00 AM - 6:00 PM  Medication Refills:  Call your pharmacy and they will forward the refill to us. Please allow 3 business days for your refills to be completed.       Normal or non-critical lab and imaging results will be communicated to you by MyChart, letter or phone within 7 days.  If you do not hear from us within 10 days, please call the clinic. If you have a  critical or abnormal lab result, we will notify you by phone as soon as possible.       We now have PWIC (Pediatric Walk in Care)  Monday-Friday from 7:30-4. Simply walk in and be seen for your urgent needs like cough, fever, rash, diarrhea or vomiting, pink eye, UTI. No appointments needed. Ask one of the team for more information      -Your Care Team:    Dr. Saud Elizabeth - Internal Medicine/Pediatrics   Dr. Jose Booth - Family Medicine  Dr. Rosa Aguilar - Pediatrics  Dr. Tiffany Hobbs - Pediatrics  Joselin Little CNP - Family Practice Nurse Practitioner

## 2018-08-14 NOTE — MR AVS SNAPSHOT
After Visit Summary   8/14/2018    Lou Rivers    MRN: [id number]           Patient Information     Date Of Birth          1985        Visit Information        Provider Department      8/14/2018 4:30 PM Joselin Little APRN CNP [facility]        Today's Diagnoses     Breast lump on left side at 10 o'clock position    -  1    Tinnitus, bilateral          Care Instructions    PLAN:   1.   Orders Placed This Encounter   Procedures     MA Diagnostic Digital Bilateral     US Breast Left Complete 4 Quadrants     OTOLARYNGOLOGY REFERRAL       2. Patient needs to follow up in if no improvement,or sooner if worsening of symptoms or other symptoms develop.  FURTHER TESTING:       - mammogram  I will place order. Please call [phone] to schedule.  CONSULTATION/REFERRAL to Otolaryngology  Please call [phone] to make appointment  if you do not hear from referrals in the next few days.     It was a pleasure seeing you today at the [facility]. Thank you for allowing us to care for you today. We truly hope we provided you with the excellent service you deserve. Please let us know if there is anything else we can do for you so we can be sure you are leaving completley satisfied with your care experience.       General information about your clinic   Clinic Hours Lab Hours (Appointments are required)   Mon-Thurs: 7:30 AM - 7 PM Mon-Thurs: 7:30 AM - 7 PM   Fri: 7:30 AM - 5 PM Fri: 7:30 AM - 5 PM        After Hours Nurse Advise & Appts:  Red Nurse Advisors: [phone]  Red On Call: to make appointments anytime: [phone] On Call Physician: call [phone] and answering service will page the on call physician.        For urgent appointments, please call [phone] and ask for the triage nurse or your care team clinic nurse.  How to contact my care team:  MyChart: [email or web address]   Phone: [phone]   Fax: [phone]        [facility]:   Phone: [phone]  Hours: 8:00 AM - 6:00 PM  Medication Refills:  Call your pharmacy and they will forward the refill to us. Please allow 3 business days for your refills to be completed.       Normal or non-critical lab and imaging results will be communicated to you by MyChart, letter or phone within 7 days.  If you do not hear from us within 10 days, please call the clinic. If you have a critical or abnormal lab result, we will notify you by phone as soon as possible.       We now have PWIC (Pediatric Walk in Care)  Monday-Friday from 7:30-4. Simply walk in and be seen for your urgent needs like cough, fever, rash, diarrhea or vomiting, pink eye, UTI. No appointments needed. Ask one of the team for more information      -Your Care Team:    Dr. Saud Elizabeth - Internal Medicine/Pediatrics   Dr. Jose Booth - Family Medicine  Dr. Rosa Aguilar - Pediatrics  Dr. Tiffany Hobbs - Pediatrics  Joselin Little CNP - Family Practice Nurse Practitioner                                             Follow-ups after your visit        Additional Services     OTOLARYNGOLOGY REFERRAL       Your provider has referred you to: [facility]: [facility] [place] [phone]   [email or web address]    Please be aware that coverage of these services is subject to the terms and limitations of your health insurance plan.  Call member services at your health plan with any benefit or coverage questions.      Please bring the following with you to your appointment:    (1) Any X-Rays, CTs or MRIs which have been performed.  Contact the facility where they were done to arrange for  prior to your scheduled appointment.   (2) List of current medications  (3) This referral request   (4) Any documents/labs given to you for this referral                  Your next 10 appointments already scheduled     Sep 14, 2018  4:00 PM CDT   Office Visit with Wen  Daryl Laurent,    [facility] ([facility])    [address] [address] [place] [id number] [phone]           Bring a current list of meds and any records pertaining to this visit. For Physicals, please bring immunization records and any forms needing to be filled out. Please arrive 10 minutes early to complete paperwork.              Future tests that were ordered for you today     Open Future Orders        Priority Expected Expires Ordered    MA Diagnostic Digital Bilateral Routine  8/14/2019 8/14/2018    US Breast Left Complete 4 Quadrants Routine  8/14/2019 8/14/2018            Who to contact     If you have questions or need follow up information about today's clinic visit or your schedule please contact [facility] directly at [phone].  Normal or non-critical lab and imaging results will be communicated to you by [employer], letter or phone within 4 business days after the clinic has received the results. If you do not hear from us within 7 days, please contact the clinic through [employer] or phone. If you have a critical or abnormal lab result, we will notify you by phone as soon as possible.  Submit refill requests through [employer] or call your pharmacy and they will forward the refill request to us. Please allow 3 business days for your refill to be completed.          Additional Information About Your Visit        [employer] Information     [employer] gives you secure access to your electronic health record. If you see a primary care provider, you can also send messages to your care team and make appointments. If you have questions, please call your primary care clinic.  If you do not have a primary care provider, please call [phone] and they will assist you.      [employer] is an electronic gateway that provides easy, online access to your medical records. With [employer], you can request a clinic appointment, read your test results, renew a  prescription or communicate with your care team.     To access your existing account, please contact your [facility] or call [phone] for assistance.        Care EveryWhere ID     This is your Care EveryWhere ID. This could be used by other organizations to access your [place] medical records  [id number]        Your Vitals Were     Pulse Temperature Last Period Pulse Oximetry Breastfeeding? BMI (Body Mass Index)    82 98.6  F (37  C) (Temporal) 09/18/2017 96% Yes 25.45 kg/m2       Blood Pressure from Last 3 Encounters:   08/14/18 110/80   08/13/18 106/72   06/29/18 118/79    Weight from Last 3 Encounters:   08/14/18 144 lb 12.8 oz (65.7 kg)   08/13/18 141 lb 9.6 oz (64.2 kg)   06/29/18 157 lb 12.8 oz (71.6 kg)              We Performed the Following     OTOLARYNGOLOGY REFERRAL        Primary Care Provider Office Phone # Fax #    [facility] [phone]       [address]  [facility] [address]        Equal Access to Services     ALFONSO SHERMAN : Hadii aad ku hadasho Soomaali, waaxda luqadaha, qaybta kaalmada adeegyada, nicole farrar hayveronican memo vargas . So [facility] [phone].    ATENCIÓN: Si habla español, tiene a dahl disposición servicios gratuitos de asistencia lingüística. Llame al [phone].    We comply with applicable federal civil rights laws and [place] laws. We do not discriminate on the basis of race, color, national origin, age, disability, sex, sexual orientation, or gender identity.            Thank you!     Thank you for choosing [facility]  for your care. Our goal is always to provide you with excellent care. Hearing back from our patients is one way we can continue to improve our services. Please take a few minutes to complete the written survey that you may receive in the mail after your visit with us. Thank you!             Your Updated Medication List - Protect others around you: Learn how to  safely use, store and throw away your medicines at www.disposemymeds.org.          This list is accurate as of 8/14/18  5:10 PM.  Always use your most recent med list.                   Brand Name Dispense Instructions for use Diagnosis    CALCIUM-MAGNESIUM-ZINC PO           prenatal multivitamin plus iron 27-0.8 MG Tabs per tablet     100 tablet    Take 1 tablet by mouth daily    Family planning counseling

## 2018-08-29 ENCOUNTER — RADIANT APPOINTMENT (OUTPATIENT)
Dept: ULTRASOUND IMAGING | Facility: CLINIC | Age: 33
End: 2018-08-29
Attending: NURSE PRACTITIONER
Payer: COMMERCIAL

## 2018-08-29 DIAGNOSIS — N63.22 BREAST LUMP ON LEFT SIDE AT 10 O'CLOCK POSITION: ICD-10-CM

## 2018-08-29 PROCEDURE — 76642 ULTRASOUND BREAST LIMITED: CPT | Mod: LT | Performed by: RADIOLOGY

## 2018-09-14 ENCOUNTER — OFFICE VISIT (OUTPATIENT)
Dept: OBGYN | Facility: CLINIC | Age: 33
End: 2018-09-14
Payer: COMMERCIAL

## 2018-09-14 VITALS
BODY MASS INDEX: 25.5 KG/M2 | HEART RATE: 87 BPM | DIASTOLIC BLOOD PRESSURE: 76 MMHG | OXYGEN SATURATION: 96 % | WEIGHT: 145.1 LBS | SYSTOLIC BLOOD PRESSURE: 111 MMHG

## 2018-09-14 DIAGNOSIS — Z32.00 PREGNANCY EXAMINATION OR TEST, PREGNANCY UNCONFIRMED: ICD-10-CM

## 2018-09-14 DIAGNOSIS — Z30.017 NEXPLANON INSERTION: Primary | ICD-10-CM

## 2018-09-14 LAB — BETA HCG QUAL IFA URINE: NEGATIVE

## 2018-09-14 PROCEDURE — 99214 OFFICE O/P EST MOD 30 MIN: CPT | Mod: 25 | Performed by: OBSTETRICS & GYNECOLOGY

## 2018-09-14 PROCEDURE — 11981 INSERTION DRUG DLVR IMPLANT: CPT | Performed by: OBSTETRICS & GYNECOLOGY

## 2018-09-14 PROCEDURE — 84703 CHORIONIC GONADOTROPIN ASSAY: CPT | Performed by: OBSTETRICS & GYNECOLOGY

## 2018-09-14 NOTE — PROGRESS NOTES
This 34 y/o female, , presents for insertion of Nexplanon for contraception and informed consent was reviewed and obtained.  She requested that her  interpret for her and declined a professional.  Her pre-procedural UPT was negative today and she denied any exposure risk.  She is breastfeeding without difficulty and delivered on 18.  /76 (Patient Position: Sitting, Cuff Size: Adult Regular)  Pulse 87  Wt 145 lb 1.6 oz (65.8 kg)  LMP 2017  SpO2 96%  Breastfeeding? Yes  BMI 25.5 kg/m2  ROS:  10 systems were reviewed and the positives were listed under problems.  Informed consent was reviewed and obtained.  She lay down in a supine position with her non-dominant left arm bent at the elbow with her left hand near her left ear.  The first purple mary lou was made 8 cm medical to her left epicondyle and a second mary lou was made 2 cm medial to the first.  The skin was then cleansed with betadine x 3 swabs and then swiped with 2 alcohol pads.  1% Lidocaine was injected distal to the first dot and the track was injected using 6 ccs with spillage.  She tolerated this well and after several minutes, the site was tested and found to be numb.  The Nexplanon was inserted per protocol and she tolerated the procedure well.  Tincture of Benzoin was applied peripherally to the insertion site and then 2 steristrips were applied and covered with a Band-aid.  An ACE wrap was placed around her arm and the EBL was 1 ml.  There were no complications and counts were all correct.  Assessment - Nexplanon insertion for contraception  Plan - Both verbal and written information on Nexplanon were furnished to the patient and all her questions and concerns were addressed.  She understands to return in 2021 for removal/replacement if she still desires to use this form of contraception.  Post-procedural care was discussed and her  interpreted.  She was furnished the wallet card and patient information  packaging.  NDC #1877-1506-19  Exp 10/2020  Lot #[id number]  This was a 30-minute visit and over 50% of the time was spent in direct pt consultation and 5 minutes in procedure.

## 2018-09-14 NOTE — PATIENT INSTRUCTIONS
If you have any questions regarding your visit, Please contact your care team.    [facility] HOURS TELEPHONE NUMBER   Wen Laurent DO.    Sneha [facility]    Deepthi -    KENNETH Mahoney       Monday, Wednesday, [facility]  8:30a.m-5:00 p.m   [facility]  [address] [place] [address]  [phone] ask for [facility]    Imaging [phone]       Urgent Care locations:    [facility] Saturday and Sunday   9 am - 5 pm    Monday-Friday   12 pm - 8 pm  Saturday and Sunday   9 am - 5 pm   [phone]     [facility] Labor and Delivery:  [phone]    If you need a medication refill, please contact your pharmacy. Please allow 3 business days for your refill to be completed.  As always, Thank you for trusting us with your healthcare needs!                                                           If you have any questions regarding your visit, Please contact your care team.    [facility] HOURS TELEPHONE NUMBER   Wen Laurent DO.    Sneha [facility]    Deepthi -    KENNETH Mahoney       Monday, Wednesday, [facility]  8:30a.m-5:00 p.m   [facility]  [address] [place] [address]  [phone] ask for [facility]    Imaging [phone]       Urgent Care locations:    [facility] Saturday and Sunday   9 am - 5 pm    Monday-Friday   12 pm - 8 pm  Saturday and Sunday   9 am - 5 pm   [phone]     [facility] Labor and Delivery:  [phone]    If you need a medication refill, please contact your pharmacy. Please allow 3 business days for your refill to be completed.  As always, Thank you for trusting us with your healthcare needs!

## 2018-09-14 NOTE — MR AVS SNAPSHOT
After Visit Summary   9/14/2018    Lou Rivers    MRN: [id number]           Patient Information     Date Of Birth          1985        Visit Information        Provider Department      9/14/2018 4:00 PM Wen Laurent DO [facility]        Today's Diagnoses     Pregnancy examination or test, pregnancy unconfirmed    -  1      Care Instructions                                                         If you have any questions regarding your visit, Please contact your care team.    [facility] HOURS TELEPHONE NUMBER   DO. Sneha Rush CMA Lisa -    KENNETH Mahoney       Monday, Wednesday[facility]  8:30a.m-5:00 p.m   [facility]  [address] [place] [address]  [phone] ask for [facility]    Imaging [phone]       Urgent Care locations:    [facility] Saturday and Sunday   9 am - 5 pm    Monday-Friday   12 pm - 8 pm  Saturday and Sunday   9 am - 5 pm   [phone]     [facility] Labor and Delivery:  [phone]    If you need a medication refill, please contact your pharmacy. Please allow 3 business days for your refill to be completed.  As always, Thank you for trusting us with your healthcare needs!                                                           If you have any questions regarding your visit, Please contact your care team.    [facility] HOURS TELEPHONE NUMBER   Wen Laurent DO.    AVELINA Hoover -    KENNETH Mahoney       Monday, Wednesday, Thursday and Friday, [place]  8:30a.m-5:00 p.m   [facility]  [address] [place] [address]  [phone] ask for [facility]    Imaging [phone]       Urgent Care locations:    [facility] Saturday and Sunday   9 am - 5 pm    Monday-Friday   12 pm - 8 pm  Saturday  and Sunday   9 am - 5 pm   [phone]     [facility] Labor and Delivery:  [phone]    If you need a medication refill, please contact your pharmacy. Please allow 3 business days for your refill to be completed.  As always, Thank you for trusting us with your healthcare needs!                Follow-ups after your visit        Who to contact     If you have questions or need follow up information about today's clinic visit or your schedule please contact [facility] directly at [phone].  Normal or non-critical lab and imaging results will be communicated to you by MyChart, letter or phone within 4 business days after the clinic has received the results. If you do not hear from us within 7 days, please contact the clinic through nap- [employer] or phone. If you have a critical or abnormal lab result, we will notify you by phone as soon as possible.  Submit refill requests through [employer] or call your pharmacy and they will forward the refill request to us. Please allow 3 business days for your refill to be completed.          Additional Information About Your Visit        MyChart Information     [employer] gives you secure access to your electronic health record. If you see a primary care provider, you can also send messages to your care team and make appointments. If you have questions, please call your primary care clinic.  If you do not have a primary care provider, please call [phone] and they will assist you.        Care EveryWhere ID     This is your Care EveryWhere ID. This could be used by other organizations to access your [place] medical records  [id number]        Your Vitals Were     Pulse Last Period Pulse Oximetry Breastfeeding? BMI (Body Mass Index)       87 09/18/2017 96% Yes 25.5 kg/m2        Blood Pressure from Last 3 Encounters:   09/14/18 111/76   08/14/18 110/80   08/13/18 106/72    Weight from Last 3 Encounters:   09/14/18 145 lb 1.6 oz  (65.8 kg)   08/14/18 144 lb 12.8 oz (65.7 kg)   08/13/18 141 lb 9.6 oz (64.2 kg)              We Performed the Following     Beta HCG qual IFA urine        Primary Care Provider Office Phone # Fax #    [facility] [phone]       [address]  [facility] [address]        Equal Access to Services     ALFONSO SHERMAN : Hadii aad ku hadasho Soomaali, waaxda luqadaha, qaybta kaalmada adeegyada, waxay idiin hayaan adeeg lianna lazofia . So [facility] [phone].    ATENCIÓN: Si habla español, tiene a dahl disposición servicios gratuitos de asistencia lingüística. Llame al [phone].    We comply with applicable federal civil rights laws and [place] laws. We do not discriminate on the basis of race, color, national origin, age, disability, sex, sexual orientation, or gender identity.            Thank you!     Thank you for choosing [facility]  for your care. Our goal is always to provide you with excellent care. Hearing back from our patients is one way we can continue to improve our services. Please take a few minutes to complete the written survey that you may receive in the mail after your visit with us. Thank you!             Your Updated Medication List - Protect others around you: Learn how to safely use, store and throw away your medicines at www.disposemymeds.org.          This list is accurate as of 9/14/18  4:24 PM.  Always use your most recent med list.                   Brand Name Dispense Instructions for use Diagnosis    CALCIUM-MAGNESIUM-ZINC PO           prenatal multivitamin plus iron 27-0.8 MG Tabs per tablet     100 tablet    Take 1 tablet by mouth daily    Family planning counseling

## 2018-12-11 ENCOUNTER — OFFICE VISIT (OUTPATIENT)
Dept: PEDIATRICS | Facility: CLINIC | Age: 33
End: 2018-12-11
Payer: COMMERCIAL

## 2018-12-11 ENCOUNTER — ANCILLARY PROCEDURE (OUTPATIENT)
Dept: GENERAL RADIOLOGY | Facility: CLINIC | Age: 33
End: 2018-12-11
Attending: NURSE PRACTITIONER
Payer: COMMERCIAL

## 2018-12-11 VITALS
TEMPERATURE: 98.1 F | DIASTOLIC BLOOD PRESSURE: 70 MMHG | BODY MASS INDEX: 25.75 KG/M2 | HEART RATE: 76 BPM | WEIGHT: 146.5 LBS | OXYGEN SATURATION: 98 % | SYSTOLIC BLOOD PRESSURE: 105 MMHG

## 2018-12-11 DIAGNOSIS — R10.84 ABDOMINAL PAIN, GENERALIZED: ICD-10-CM

## 2018-12-11 DIAGNOSIS — R09.A2 GLOBUS SENSATION: ICD-10-CM

## 2018-12-11 DIAGNOSIS — N64.4 BREAST PAIN: ICD-10-CM

## 2018-12-11 DIAGNOSIS — R10.84 ABDOMINAL PAIN, GENERALIZED: Primary | ICD-10-CM

## 2018-12-11 LAB
ALBUMIN UR-MCNC: NEGATIVE MG/DL
APPEARANCE UR: CLEAR
BETA HCG QUAL IFA URINE: NEGATIVE
BILIRUB UR QL STRIP: NEGATIVE
COLOR UR AUTO: NORMAL
GLUCOSE UR STRIP-MCNC: NEGATIVE MG/DL
HGB UR QL STRIP: NEGATIVE
KETONES UR STRIP-MCNC: NEGATIVE MG/DL
LEUKOCYTE ESTERASE UR QL STRIP: NEGATIVE
NITRATE UR QL: NEGATIVE
NON-SQ EPI CELLS #/AREA URNS LPF: NORMAL /LPF
PH UR STRIP: 7 PH (ref 5–7)
RBC #/AREA URNS AUTO: NORMAL /HPF
SOURCE: NORMAL
SP GR UR STRIP: 1.01 (ref 1–1.03)
UROBILINOGEN UR STRIP-MCNC: NORMAL MG/DL (ref 0–2)
WBC #/AREA URNS AUTO: NORMAL /HPF

## 2018-12-11 PROCEDURE — 99214 OFFICE O/P EST MOD 30 MIN: CPT | Performed by: NURSE PRACTITIONER

## 2018-12-11 PROCEDURE — 84703 CHORIONIC GONADOTROPIN ASSAY: CPT | Performed by: NURSE PRACTITIONER

## 2018-12-11 PROCEDURE — 74019 RADEX ABDOMEN 2 VIEWS: CPT | Performed by: RADIOLOGY

## 2018-12-11 PROCEDURE — 81001 URINALYSIS AUTO W/SCOPE: CPT | Performed by: NURSE PRACTITIONER

## 2018-12-11 ASSESSMENT — ANXIETY QUESTIONNAIRES
5. BEING SO RESTLESS THAT IT IS HARD TO SIT STILL: NOT AT ALL
1. FEELING NERVOUS, ANXIOUS, OR ON EDGE: SEVERAL DAYS
6. BECOMING EASILY ANNOYED OR IRRITABLE: NOT AT ALL
IF YOU CHECKED OFF ANY PROBLEMS ON THIS QUESTIONNAIRE, HOW DIFFICULT HAVE THESE PROBLEMS MADE IT FOR YOU TO DO YOUR WORK, TAKE CARE OF THINGS AT HOME, OR GET ALONG WITH OTHER PEOPLE: SOMEWHAT DIFFICULT
3. WORRYING TOO MUCH ABOUT DIFFERENT THINGS: NOT AT ALL
7. FEELING AFRAID AS IF SOMETHING AWFUL MIGHT HAPPEN: SEVERAL DAYS
GAD7 TOTAL SCORE: 2
2. NOT BEING ABLE TO STOP OR CONTROL WORRYING: NOT AT ALL

## 2018-12-11 ASSESSMENT — PATIENT HEALTH QUESTIONNAIRE - PHQ9: 5. POOR APPETITE OR OVEREATING: NOT AT ALL

## 2018-12-11 NOTE — PROGRESS NOTES
SUBJECTIVE:   Lou Rivers is a 33 year old female who presents to clinic today for the following health issues:    1. On and off sharp bilateral breast pain around the nipple and under there breast. Still has a pea size lump in the left breast. Had an ultrasound back on 8/29/18.   Is still breast feeding and was recommended to follow up for repeat ultrasound     2. Notes throat dry feeling in the left side, worst at night x 2-3 years. Patient feels like there is something stuck there. Has not been better since last year.     ABDOMINAL   PAIN     Onset: ongoing for many years but worst in the last week    Description:   Character: Burning and Fullness  Location: right lower quadrant  Radiation: can radiate to the other side    Intensity: moderate, severe    Progression of Symptoms:  worsening    Accompanying Signs & Symptoms:  Fever/Chills?: no   Gas/Bloating: no   Nausea: no   Vomitting: no   Diarrhea?: YES  Constipation:YES  Dysuria or Hematuria: no, blood around the rectal area   History:   Trauma: no   Previous similar pain: YES   Previous tests done: none    Precipitating factors:   Does the pain change with:     Food: YES- after eating     BM: no     Urination: no     Alleviating factors:  none    Therapies Tried and outcome: none    LMP:  breastfeeding         Problem list and histories reviewed & adjusted, as indicated.  Additional history: as documented    Patient Active Problem List   Diagnosis     Allergic rhinitis     Adjustment disorder with mixed anxiety and depressed mood     Supervision of other normal pregnancy, antepartum     Past Surgical History:   Procedure Laterality Date     LASIK BILATERAL Bilateral      SINUS SURGERY         Social History     Tobacco Use     Smoking status: Never Smoker     Smokeless tobacco: Never Used   Substance Use Topics     Alcohol use: No     Family History   Problem Relation Age of Onset     Coronary Artery Disease Mother      Hypertension Mother      Other  Cancer Mother         throat cancer     Other - See Comments Mother         neck problems     Thyroid Disease Mother      Coronary Artery Disease Father      Hypertension Father      Hyperlipidemia Father      Other - See Comments Father         herniated disc in lumbar     Gout Father      Liver Cancer Maternal Grandmother      Myocardial Infarction Maternal Grandfather      Diabetes No family hx of      Cerebrovascular Disease No family hx of      Breast Cancer No family hx of      Colon Cancer No family hx of      Prostate Cancer No family hx of      Depression No family hx of      Anxiety Disorder No family hx of      Mental Illness No family hx of      Substance Abuse No family hx of      Anesthesia Reaction No family hx of      Asthma No family hx of      Osteoporosis No family hx of      Genetic Disorder No family hx of      Obesity No family hx of      Unknown/Adopted No family hx of          Current Outpatient Medications   Medication Sig Dispense Refill     CALCIUM-MAGNESIUM-ZINC PO        etonogestrel (IMPLANON/NEXPLANON) 68 MG IMPL 1 each (68 mg) by Subdermal route continuous  0     Prenatal Vit-Fe Fumarate-FA (PRENATAL MULTIVITAMIN PLUS IRON) 27-0.8 MG TABS per tablet Take 1 tablet by mouth daily 100 tablet 0     Allergies   Allergen Reactions     Dust Mites      House dust     Labs reviewed in EPIC    Reviewed and updated as needed this visit by clinical staff  Tobacco  Allergies  Meds  Med Hx  Surg Hx  Fam Hx  Soc Hx      Reviewed and updated as needed this visit by Provider         ROS:  CONSTITUTIONAL:NEGATIVE for fever, chills, change in weight  ENT/MOUTH: POSITIVE for globus  and NEGATIVE for epistaxis, fever, hoarseness, nasal congestion, postnasal drainage and sinus pressure  RESP:NEGATIVE for significant cough or SOB  CV: NEGATIVE for chest pain, palpitations or peripheral edema  GI: POSITIVE for abdominal pain generalized and heartburn or reflux and NEGATIVE for diarrhea, hematemesis,  hematochezia, poor appetite, vomiting and weight loss  MUSCULOSKELETAL: NEGATIVE for significant arthralgias or myalgia  ENDOCRINE: NEGATIVE for temperature intolerance, skin/hair changes  HEME/ALLERGY/IMMUNE: NEGATIVE for bleeding problems    OBJECTIVE:     /70 (BP Location: Right arm, Patient Position: Sitting, Cuff Size: Adult Regular)   Pulse 76   Temp 98.1  F (36.7  C) (Temporal)   Wt 66.5 kg (146 lb 8 oz)   SpO2 98%   Breastfeeding? Yes   BMI 25.75 kg/m    Body mass index is 25.75 kg/m .  GENERAL: healthy, alert and no distress  EYES: Eyes grossly normal to inspection and conjunctivae and sclerae normal  NECK: no adenopathy, no asymmetry, masses, or scars and thyroid normal to palpation  RESP: lungs clear to auscultation - no rales, rhonchi or wheezes  CV: regular rate and rhythm, normal S1 S2, no S3 or S4, no murmur, click or rub, no peripheral edema and peripheral pulses strong  ABDOMEN: soft, nontender  MS: no gross musculoskeletal defects noted, no edema  SKIN: no suspicious lesions or rashes  NEURO: Normal strength and tone, mentation intact and speech normal  PSYCH: mentation appears normal, affect normal/bright    Diagnostic Test Results:  Results for orders placed or performed in visit on 12/11/18   UA with Microscopic reflex to Culture   Result Value Ref Range    Color Urine Light Yellow     Appearance Urine Clear     Glucose Urine Negative NEG^Negative mg/dL    Bilirubin Urine Negative NEG^Negative    Ketones Urine Negative NEG^Negative mg/dL    Specific Gravity Urine 1.009 1.003 - 1.035    Blood Urine Negative NEG^Negative    pH Urine 7.0 5.0 - 7.0 pH    Protein Albumin Urine Negative NEG^Negative mg/dL    Urobilinogen mg/dL Normal 0.0 - 2.0 mg/dL    Nitrite Urine Negative NEG^Negative    Leukocyte Esterase Urine Negative NEG^Negative    Source Midstream Urine     WBC Urine 0 - 5 OTO5^0 - 5 /HPF    RBC Urine O - 2 OTO2^O - 2 /HPF    Squamous Epithelial /LPF Urine Few FEW^Few /LPF   Beta  HCG Qual, Urine - [facility] and [facility] ([id number])   Result Value Ref Range    Beta HCG Qual IFA Urine Negative NEG^Negative        Recent Results (from the past 744 hour(s))   XR Abdomen 2 Views    Narrative    EXAMINATION: XR ABDOMEN 2 VW, 12/11/2018 5:36 PM    COMPARISON: None    HISTORY: Abdominal pain, generalized    FINDINGS: Moderate stool in the colon. No air-filled dilated small  bowel loops. Osseous structures are unremarkable.      Impression    IMPRESSION: Moderate colonic stool. Nonobstructive bowel gas pattern.     THANG PATEL MD       ASSESSMENT/PLAN:     Lou was seen today for abdominal pain.    Diagnoses and all orders for this visit:    Abdominal pain, generalized  -     UA with Microscopic reflex to Culture  -     Beta HCG Qual, Urine - [facility] and [facility] ([id number])  -     XR Abdomen 2 Views; Future  A high fiber diet with plenty of fluids (up to 8 glasses of water daily) is suggested to relieve these symptoms.  Metamucil, 1 tablespoon once or twice daily can be used to keep bowels regular if needed.    Breast pain  -     US Breast Left Complete 4 Quadrants; Future    Globus sensation  -     ALLERGY/ASTHMA ADULT REFERRAL    PLAN:    Patient needs to follow up in if no improvement,or sooner if worsening of symptoms or other symptoms develop.  FURTHER TESTING:       - xray abdomen   Schedule breast ultrasound   CONSULTATION/REFERRAL to allergist   Will follow up and/or notify patient of  results via My Chart to determine further need for followup      See Patient Instructions    LAINEY Jang CNP  [facility]

## 2018-12-11 NOTE — NURSING NOTE
"Chief Complaint   Patient presents with     Abdominal Pain     RLQ abdominal pain on and off for many years, noticed the pain more after eating       Initial /70 (BP Location: Right arm, Patient Position: Sitting, Cuff Size: Adult Regular)   Pulse 76   Temp 98.1  F (36.7  C) (Temporal)   Wt 66.5 kg (146 lb 8 oz)   SpO2 98%   Breastfeeding? Yes   BMI 25.75 kg/m   Estimated body mass index is 25.75 kg/m  as calculated from the following:    Height as of 3/13/18: 1.607 m (5' 3.25\").    Weight as of this encounter: 66.5 kg (146 lb 8 oz).  Medication Reconciliation: complete      JEFFREY Lainez      "

## 2018-12-11 NOTE — PATIENT INSTRUCTIONS
PLAN:   1. A high fiber diet with plenty of fluids (up to 8 glasses of water daily) is suggested to relieve these symptoms.  Metamucil, 1 tablespoon once or twice daily can be used to keep bowels regular if needed.  2  Orders Placed This Encounter   Procedures     US Breast Left Complete 4 Quadrants     XR Abdomen 2 Views     UA with Microscopic reflex to Culture     Beta HCG Qual, Urine - [facility] and [facility] ([id number])     ALLERGY/ASTHMA ADULT REFERRAL       3. Patient needs to follow up in if no improvement,or sooner if worsening of symptoms or other symptoms develop.  FURTHER TESTING:       - xray abdomen   Schedule breast ultrasound   CONSULTATION/REFERRAL to allergist   Will follow up and/or notify patient of  results via My Chart to determine further need for followup

## 2018-12-12 ASSESSMENT — ANXIETY QUESTIONNAIRES: GAD7 TOTAL SCORE: 2

## 2018-12-12 NOTE — RESULT ENCOUNTER NOTE
Dennis Rivers,    Attached are your test results.  -Urine is normal.   Please contact us if you have any questions.    Joselin Little, CNP

## 2018-12-12 NOTE — RESULT ENCOUNTER NOTE
Dennis Rivers,    Attached are your test results.  Xray shows a moderate amount of stool consistent with constipation   A high fiber diet with plenty of fluids (up to 8 glasses of water daily) is suggested to relieve these symptoms.  Metamucil, 1 tablespoon once or twice daily can be used to keep bowels regular if needed.     Please contact us if you have any questions.    Joselin Little, CNP

## 2019-01-15 DIAGNOSIS — Z30.09 FAMILY PLANNING COUNSELING: ICD-10-CM

## 2019-01-16 NOTE — TELEPHONE ENCOUNTER
Hello,  last fill date:12-  Last quantity:100    Thank You,  Jeni Cali  Pharmacy Technician  [facility]  [phone]

## 2019-01-17 RX ORDER — PRENATAL VIT/IRON FUM/FOLIC AC 27MG-0.8MG
TABLET ORAL
Qty: 100 TABLET | Refills: 3 | Status: SHIPPED | OUTPATIENT
Start: 2019-01-17

## 2019-02-15 ENCOUNTER — ANCILLARY PROCEDURE (OUTPATIENT)
Dept: MAMMOGRAPHY | Facility: CLINIC | Age: 34
End: 2019-02-15
Attending: NURSE PRACTITIONER
Payer: COMMERCIAL

## 2019-02-15 ENCOUNTER — ANCILLARY PROCEDURE (OUTPATIENT)
Dept: ULTRASOUND IMAGING | Facility: CLINIC | Age: 34
End: 2019-02-15
Attending: NURSE PRACTITIONER
Payer: COMMERCIAL

## 2019-02-15 ENCOUNTER — TELEPHONE (OUTPATIENT)
Dept: PEDIATRICS | Facility: CLINIC | Age: 34
End: 2019-02-15

## 2019-02-15 DIAGNOSIS — N64.4 BREAST PAIN: ICD-10-CM

## 2019-02-15 PROCEDURE — G0279 TOMOSYNTHESIS, MAMMO: HCPCS | Performed by: STUDENT IN AN ORGANIZED HEALTH CARE EDUCATION/TRAINING PROGRAM

## 2019-02-15 PROCEDURE — 76642 ULTRASOUND BREAST LIMITED: CPT | Mod: 50 | Performed by: STUDENT IN AN ORGANIZED HEALTH CARE EDUCATION/TRAINING PROGRAM

## 2019-02-15 PROCEDURE — 77066 DX MAMMO INCL CAD BI: CPT | Performed by: STUDENT IN AN ORGANIZED HEALTH CARE EDUCATION/TRAINING PROGRAM

## 2019-02-15 NOTE — TELEPHONE ENCOUNTER
[facility] CLINICAL DOCUMENTATION    Form Documentation Form or Letter Request    Type or form/letter needing completion: biometric forms  Provider: LAINEY Krishnamurthy CNP  Has provider seen patient for office visit related to reason for form request? No, last annual physical was 4/27/17, no recent lipid labs  Date form needed: by 11/30/19  Once completed: Mail form to Name: address on file and form does not match, at Address: [address], [place] [address]     JEFFREY Lainez

## 2019-02-18 NOTE — TELEPHONE ENCOUNTER
Called Jean to inform patient needs to schedule for physical. Jean states understanding and agreement to plan. Will bring in new form to visit.  Quintin RIGGINS CMA

## 2019-04-12 ENCOUNTER — OFFICE VISIT (OUTPATIENT)
Dept: PEDIATRICS | Facility: CLINIC | Age: 34
End: 2019-04-12
Payer: COMMERCIAL

## 2019-04-12 VITALS
OXYGEN SATURATION: 95 % | WEIGHT: 140.4 LBS | HEART RATE: 90 BPM | HEIGHT: 63 IN | TEMPERATURE: 98.4 F | DIASTOLIC BLOOD PRESSURE: 60 MMHG | BODY MASS INDEX: 24.88 KG/M2 | SYSTOLIC BLOOD PRESSURE: 100 MMHG

## 2019-04-12 DIAGNOSIS — Z13.29 SCREENING FOR THYROID DISORDER: ICD-10-CM

## 2019-04-12 DIAGNOSIS — R25.2 MUSCLE CRAMPS: ICD-10-CM

## 2019-04-12 DIAGNOSIS — Z00.00 ENCOUNTER FOR ROUTINE ADULT HEALTH EXAMINATION WITHOUT ABNORMAL FINDINGS: Primary | ICD-10-CM

## 2019-04-12 DIAGNOSIS — Z13.6 CARDIOVASCULAR SCREENING; LDL GOAL LESS THAN 160: ICD-10-CM

## 2019-04-12 DIAGNOSIS — Z13.1 SCREENING FOR DIABETES MELLITUS (DM): ICD-10-CM

## 2019-04-12 DIAGNOSIS — K21.9 GASTROESOPHAGEAL REFLUX DISEASE WITHOUT ESOPHAGITIS: ICD-10-CM

## 2019-04-12 DIAGNOSIS — F43.23 ADJUSTMENT DISORDER WITH MIXED ANXIETY AND DEPRESSED MOOD: ICD-10-CM

## 2019-04-12 DIAGNOSIS — B35.4 TINEA CORPORIS: ICD-10-CM

## 2019-04-12 LAB
ANION GAP SERPL CALCULATED.3IONS-SCNC: 9 MMOL/L (ref 3–14)
BUN SERPL-MCNC: 13 MG/DL (ref 7–30)
CALCIUM SERPL-MCNC: 8.7 MG/DL (ref 8.5–10.1)
CHLORIDE SERPL-SCNC: 110 MMOL/L (ref 94–109)
CHOLEST SERPL-MCNC: 208 MG/DL
CO2 SERPL-SCNC: 24 MMOL/L (ref 20–32)
CREAT SERPL-MCNC: 0.47 MG/DL (ref 0.52–1.04)
DEPRECATED CALCIDIOL+CALCIFEROL SERPL-MC: 27 UG/L (ref 20–75)
GFR SERPL CREATININE-BSD FRML MDRD: >90 ML/MIN/{1.73_M2}
GLUCOSE SERPL-MCNC: 95 MG/DL (ref 70–99)
HDLC SERPL-MCNC: 61 MG/DL
LDLC SERPL CALC-MCNC: 128 MG/DL
NONHDLC SERPL-MCNC: 147 MG/DL
POTASSIUM SERPL-SCNC: 4 MMOL/L (ref 3.4–5.3)
SODIUM SERPL-SCNC: 143 MMOL/L (ref 133–144)
TRIGL SERPL-MCNC: 96 MG/DL
TSH SERPL DL<=0.005 MIU/L-ACNC: 1.28 MU/L (ref 0.4–4)

## 2019-04-12 PROCEDURE — 80048 BASIC METABOLIC PNL TOTAL CA: CPT | Performed by: NURSE PRACTITIONER

## 2019-04-12 PROCEDURE — 99213 OFFICE O/P EST LOW 20 MIN: CPT | Mod: 25 | Performed by: NURSE PRACTITIONER

## 2019-04-12 PROCEDURE — 80061 LIPID PANEL: CPT | Performed by: NURSE PRACTITIONER

## 2019-04-12 PROCEDURE — 82306 VITAMIN D 25 HYDROXY: CPT | Performed by: NURSE PRACTITIONER

## 2019-04-12 PROCEDURE — 99395 PREV VISIT EST AGE 18-39: CPT | Performed by: NURSE PRACTITIONER

## 2019-04-12 PROCEDURE — 36415 COLL VENOUS BLD VENIPUNCTURE: CPT | Performed by: NURSE PRACTITIONER

## 2019-04-12 PROCEDURE — 84443 ASSAY THYROID STIM HORMONE: CPT | Performed by: NURSE PRACTITIONER

## 2019-04-12 RX ORDER — CLOTRIMAZOLE 1 %
CREAM (GRAM) TOPICAL 2 TIMES DAILY
Qty: 30 G | Refills: 0 | Status: SHIPPED | OUTPATIENT
Start: 2019-04-12 | End: 2019-04-26

## 2019-04-12 ASSESSMENT — ANXIETY QUESTIONNAIRES
GAD7 TOTAL SCORE: 1
7. FEELING AFRAID AS IF SOMETHING AWFUL MIGHT HAPPEN: NOT AT ALL
6. BECOMING EASILY ANNOYED OR IRRITABLE: NOT AT ALL
IF YOU CHECKED OFF ANY PROBLEMS ON THIS QUESTIONNAIRE, HOW DIFFICULT HAVE THESE PROBLEMS MADE IT FOR YOU TO DO YOUR WORK, TAKE CARE OF THINGS AT HOME, OR GET ALONG WITH OTHER PEOPLE: SOMEWHAT DIFFICULT
1. FEELING NERVOUS, ANXIOUS, OR ON EDGE: SEVERAL DAYS
5. BEING SO RESTLESS THAT IT IS HARD TO SIT STILL: NOT AT ALL
2. NOT BEING ABLE TO STOP OR CONTROL WORRYING: NOT AT ALL
3. WORRYING TOO MUCH ABOUT DIFFERENT THINGS: NOT AT ALL

## 2019-04-12 ASSESSMENT — PATIENT HEALTH QUESTIONNAIRE - PHQ9
SUM OF ALL RESPONSES TO PHQ QUESTIONS 1-9: 4
5. POOR APPETITE OR OVEREATING: NOT AT ALL

## 2019-04-12 ASSESSMENT — MIFFLIN-ST. JEOR: SCORE: 1300.98

## 2019-04-12 NOTE — RESULT ENCOUNTER NOTE
Dennis Rivers,    Attached are your test results.  -Vitamin D level is slightly below normal and getting 9640-7613 IU daily in  supplements is recommended.    Please contact us if you have any questions.    Joselin Little, CNP

## 2019-04-12 NOTE — RESULT ENCOUNTER NOTE
Dennis Rivers,    Attached are your test results.  -LDL(bad) cholesterol level is elevated which can increase your heart disease risk.  A diet high in fat and simple carbohydrates, genetics and being overweight can contribute to this. ADVISE: exercising 150 minutes of aerobic exercise per week (30 minutes for 5 days per week or 50 minutes for 3 days per week are options) and eating a low saturated fat/low carbohydrate diet are helpful to improve this. In 12 months, you should recheck your fasting cholesterol panel by scheduling a lab-only appointment.  -Kidney function is normal (Cr, GFR), Sodium is normal, Potassium is normal, Calcium is normal, Glucose is normal.   -TSH (thyroid stimulating hormone) level is normal which indicates normal thyroid function.   Please contact us if you have any questions.    Joselin Little, CNP

## 2019-04-12 NOTE — PROGRESS NOTES
SUBJECTIVE:   CC: Lou Rivers is an 33 year old woman who presents for preventive health visit.     Healthy Habits:    Do you get at least three servings of calcium containing foods daily (dairy, green leafy vegetables, etc.)? yes    Amount of exercise or daily activities, outside of work: active at home with the baby    Problems taking medications regularly No    Medication side effects: No    Have you had an eye exam in the past two years? no    Do you see a dentist twice per year? no    Do you have sleep apnea, excessive snoring or daytime drowsiness?no      Today's PHQ-2 Score:   PHQ-2 ( 1999 [employer]) 4/12/2019 12/11/2018   Q1: Little interest or pleasure in doing things 0 0   Q2: Feeling down, depressed or hopeless 0 0   PHQ-2 Score 0 0       Abuse: Current or Past(Physical, Sexual or Emotional)- No  Do you feel safe in your environment? Yes    Social History     Tobacco Use     Smoking status: Never Smoker     Smokeless tobacco: Never Used   Substance Use Topics     Alcohol use: No     If you drink alcohol do you typically have >3 drinks per day or >7 drinks per week? No                     Reviewed orders with patient.  Reviewed health maintenance and updated orders accordingly - Yes  Labs reviewed in EPIC  BP Readings from Last 3 Encounters:   04/12/19 100/60   12/11/18 105/70   09/14/18 111/76    Wt Readings from Last 3 Encounters:   04/12/19 63.7 kg (140 lb 6.4 oz)   12/11/18 66.5 kg (146 lb 8 oz)   09/14/18 65.8 kg (145 lb 1.6 oz)                  Patient Active Problem List   Diagnosis     Allergic rhinitis     Adjustment disorder with mixed anxiety and depressed mood     Supervision of other normal pregnancy, antepartum     Past Surgical History:   Procedure Laterality Date     LASIK BILATERAL Bilateral      SINUS SURGERY         Social History     Tobacco Use     Smoking status: Never Smoker     Smokeless tobacco: Never Used   Substance Use Topics     Alcohol use: No     Family History   Problem  Relation Age of Onset     Coronary Artery Disease Mother      Hypertension Mother      Other Cancer Mother         throat cancer     Other - See Comments Mother         neck problems     Thyroid Disease Mother      Coronary Artery Disease Father      Hypertension Father      Hyperlipidemia Father      Other - See Comments Father         herniated disc in lumbar     Gout Father      Liver Cancer Maternal Grandmother      Myocardial Infarction Maternal Grandfather      Diabetes No family hx of      Cerebrovascular Disease No family hx of      Breast Cancer No family hx of      Colon Cancer No family hx of      Prostate Cancer No family hx of      Depression No family hx of      Anxiety Disorder No family hx of      Mental Illness No family hx of      Substance Abuse No family hx of      Anesthesia Reaction No family hx of      Asthma No family hx of      Osteoporosis No family hx of      Genetic Disorder No family hx of      Obesity No family hx of      Unknown/Adopted No family hx of          Current Outpatient Medications   Medication Sig Dispense Refill     CALCIUM-MAGNESIUM-ZINC PO        etonogestrel (IMPLANON/NEXPLANON) 68 MG IMPL 1 each (68 mg) by Subdermal route continuous  0     Prenatal Vit-Fe Fumarate-FA (PRENATAL MULTIVITAMIN W/IRON) 27-0.8 MG tablet TAKE ONE TABLET BY MOUTH EVERY  tablet 3     Allergies   Allergen Reactions     Dust Mites      House dust       Mammogram not appropriate for this patient based on age.    Pertinent mammograms are reviewed under the imaging tab.  History of abnormal Pap smear: NO - age 30- 65 PAP every 3 years recommended  PAP / HPV Latest Ref Rng & Units 4/27/2017   PAP - NIL   HPV 16 DNA NEG Negative   HPV 18 DNA NEG Negative   OTHER HR HPV NEG Negative     Reviewed and updated as needed this visit by clinical staff  Tobacco  Allergies  Meds  Med Hx  Surg Hx  Fam Hx  Soc Hx        Reviewed and updated as needed this visit by Provider        Past Medical  "History:   Diagnosis Date     Allergic rhinitis      History of anxiety disorder 2012     Latent tuberculosis 2003      Past Surgical History:   Procedure Laterality Date     LASIK BILATERAL Bilateral      SINUS SURGERY         ROS:  CONSTITUTIONAL:NEGATIVE for fever, chills, change in weight  INTEGUMENTARY/SKIN: NEGATIVE for changing lesion  and non-healing lesion  and POSITIVE for rash on abdomen for about a couple weeks and tried hydrocortisone   EYES: NEGATIVE for vision changes or irritation  ENT: NEGATIVE for ear, mouth and throat problems. Still feels like something in throat for several years   RESP:NEGATIVE for significant cough or SOB  BREAST: NEGATIVE for masses, tenderness or discharge  CV: NEGATIVE for chest pain, palpitations or peripheral edema  GI: POSITIVE for heartburn or reflux and NEGATIVE for nausea, poor appetite, vomiting and weight loss   female: no unusual urinary symptoms, no unusual vaginal symptoms and menses: amenorrhea with breast feeding, is 7 months postpartum so just recently has GYN check up   MUSCULOSKELETAL:POSITIVE  for muscle cramps  and NEGATIVE for joint swelling  and joint warmth   NEURO: NEGATIVE for weakness, dizziness or paresthesias  ENDOCRINE: NEGATIVE for temperature intolerance, skin/hair changes  HEME/ALLERGY/IMMUNE: NEGATIVE for bleeding problems  PSYCHIATRIC: POSITIVE forHx anxiety and NEGATIVE forthoughts of hurting someone else, thoughts of self harm and mood is good      OBJECTIVE:   /60 (BP Location: Right arm, Patient Position: Sitting, Cuff Size: Adult Regular)   Pulse 90   Temp 98.4  F (36.9  C) (Temporal)   Ht 1.6 m (5' 3\")   Wt 63.7 kg (140 lb 6.4 oz)   SpO2 95%   Breastfeeding? Yes   BMI 24.87 kg/m     Wt Readings from Last 4 Encounters:   04/12/19 63.7 kg (140 lb 6.4 oz)   12/11/18 66.5 kg (146 lb 8 oz)   09/14/18 65.8 kg (145 lb 1.6 oz)   08/14/18 65.7 kg (144 lb 12.8 oz)       EXAM:  GENERAL: healthy, alert and no distress  EYES: Eyes " grossly normal to inspection, PERRL and conjunctivae and sclerae normal  HENT: ear canals and TM's normal, nose and mouth without ulcers or lesions  NECK: no adenopathy, no asymmetry, masses, or scars and thyroid normal to palpation  RESP: lungs clear to auscultation - no rales, rhonchi or wheezes  BREAST: normal without masses, tenderness or nipple discharge and no palpable axillary masses or adenopathy  CV: regular rates and rhythm, no murmur, click or rub, peripheral pulses strong and no peripheral edema  ABDOMEN: soft, nontender, no hepatosplenomegaly, no masses and bowel sounds normal   (female): deferred  MS: no gross musculoskeletal defects noted, no edema  SKIN: no suspicious lesions or rashes   Classical, round, erythematous eruption on abdomen with slightly raised edges.  NEURO: Normal strength and tone, mentation intact and speech normal  PSYCH: mentation appears normal, affect normal/bright  LYMPH: no cervical, supraclavicular, axillary, or inguinal adenopathy    Diagnostic Test Results:  Results for orders placed or performed in visit on 04/12/19   Lipid panel reflex to direct LDL Fasting   Result Value Ref Range    Cholesterol 208 (H) <200 mg/dL    Triglycerides 96 <150 mg/dL    HDL Cholesterol 61 >49 mg/dL    LDL Cholesterol Calculated 128 (H) <100 mg/dL    Non HDL Cholesterol 147 (H) <130 mg/dL   Basic metabolic panel   Result Value Ref Range    Sodium 143 133 - 144 mmol/L    Potassium 4.0 3.4 - 5.3 mmol/L    Chloride 110 (H) 94 - 109 mmol/L    Carbon Dioxide 24 20 - 32 mmol/L    Anion Gap 9 3 - 14 mmol/L    Glucose 95 70 - 99 mg/dL    Urea Nitrogen 13 7 - 30 mg/dL    Creatinine 0.47 (L) 0.52 - 1.04 mg/dL    GFR Estimate >90 >60 mL/min/[1.73_m2]    GFR Estimate If Black >90 >60 mL/min/[1.73_m2]    Calcium 8.7 8.5 - 10.1 mg/dL   TSH with free T4 reflex   Result Value Ref Range    TSH 1.28 0.40 - 4.00 mU/L   Vitamin D Deficiency   Result Value Ref Range    Vitamin D Deficiency screening 27 20 - 75  ug/L       ASSESSMENT/PLAN:   Lou was seen today for physical.    Diagnoses and all orders for this visit:    Encounter for routine adult health examination without abnormal findings  -     Lipid panel reflex to direct LDL Fasting  -     Basic metabolic panel  -     TSH with free T4 reflex  -     JUST IN CASE  -     Vitamin D Deficiency    CARDIOVASCULAR SCREENING; LDL GOAL LESS THAN 160  -     Lipid panel reflex to direct LDL Fasting    Screening for diabetes mellitus (DM)  -     Basic metabolic panel    Screening for thyroid disorder  -     TSH with free T4 reflex    Adjustment disorder with mixed anxiety and depressed mood    I reemphasized the importance of a multi-armed approach towards the treatment of depression including regular exercise, adequate sleep, and a well-rounded, low-glycemic diet.  In addition, I emphasized the importance of ongoing development of her support network. If new or worsening symptoms, she will seek help immediately.    Gastroesophageal reflux disease without esophagitis  -     ranitidine (ZANTAC) 150 MG tablet; Take 1 tablet (150 mg) by mouth 2 times daily  Discussed that these symptoms is likely related to reflux or GERD. Discussed non-pharmacologic treatment, including elevating the head of bed, decrease food before bedtime and decreased caffeine and nicotine and alcohol.  Went over meds for reflux. Pt will try zantac . Recheck if not improving as expected.    Tinea corporis  -     clotrimazole (LOTRIMIN) 1 % external cream; Apply topically 2 times daily for 14 days  Discussed etiology and treatment options.  she opted for lotrimin  treatment and will contact the clinic if not showing improvement.    Muscle cramps  -     Vitamin D Deficiency    PLAN:   1.   Symptomatic therapy suggested: will try zantac for sensation in throat and reflux  Lotrimin cream for rash.  2.  Orders Placed This Encounter   Medications     ranitidine (ZANTAC) 150 MG tablet     Sig: Take 1 tablet (150  "mg) by mouth 2 times daily     Dispense:  180 tablet     Refill:  3     clotrimazole (LOTRIMIN) 1 % external cream     Sig: Apply topically 2 times daily for 14 days     Dispense:  30 g     Refill:  0     Orders Placed This Encounter   Procedures     Lipid panel reflex to direct LDL Fasting     Basic metabolic panel     TSH with free T4 reflex     JUST IN CASE     Vitamin D Deficiency       3. Patient needs to follow up in if no improvement,or sooner if worsening of symptoms or other symptoms develop.  Will follow up and/or notify patient of  results via My Chart to determine further need for followup  Follow up office visit in one year for annual health maintenance exam, sooner PRN.      COUNSELING:   Reviewed preventive health counseling, as reflected in patient instructions  Special attention given to:        Regular exercise       Healthy diet/nutrition       Vision screening    BP Readings from Last 1 Encounters:   04/12/19 100/60     Estimated body mass index is 25.75 kg/m  as calculated from the following:    Height as of 3/13/18: 1.607 m (5' 3.25\").    Weight as of 12/11/18: 66.5 kg (146 lb 8 oz).      Weight management plan: Discussed healthy diet and exercise guidelines     reports that she has never smoked. She has never used smokeless tobacco.      Counseling Resources:  ATP IV Guidelines  Pooled Cohorts Equation Calculator  Breast Cancer Risk Calculator  FRAX Risk Assessment  ICSI Preventive Guidelines  Dietary Guidelines for Americans, 2010  USDA's MyPlate  ASA Prophylaxis  Lung CA Screening    LAINEY Jang CNP  M [facility]  "

## 2019-04-12 NOTE — PATIENT INSTRUCTIONS
PLAN:   1.   Symptomatic therapy suggested: will try zantac for sensation in throat and reflux  Lotrimin cream for rash.  2.  Orders Placed This Encounter   Medications     ranitidine (ZANTAC) 150 MG tablet     Sig: Take 1 tablet (150 mg) by mouth 2 times daily     Dispense:  180 tablet     Refill:  3     clotrimazole (LOTRIMIN) 1 % external cream     Sig: Apply topically 2 times daily for 14 days     Dispense:  30 g     Refill:  0     Orders Placed This Encounter   Procedures     Lipid panel reflex to direct LDL Fasting     Basic metabolic panel     TSH with free T4 reflex     JUST IN CASE     Vitamin D Deficiency       3. Patient needs to follow up in if no improvement,or sooner if worsening of symptoms or other symptoms develop.  Will follow up and/or notify patient of  results via My Chart to determine further need for followup  Follow up office visit in one year for annual health maintenance exam, sooner PRN.    Preventive Health Recommendations  Female Ages 26 - 39  Yearly exam:   See your health care provider every year in order to    Review health changes.     Discuss preventive care.      Review your medicines if you your doctor has prescribed any.    Until age 30: Get a Pap test every three years (more often if you have had an abnormal result).    After age 30: Talk to your doctor about whether you should have a Pap test every 3 years or have a Pap test with HPV screening every 5 years.   You do not need a Pap test if your uterus was removed (hysterectomy) and you have not had cancer.  You should be tested each year for STDs (sexually transmitted diseases), if you're at risk.   Talk to your provider about how often to have your cholesterol checked.  If you are at risk for diabetes, you should have a diabetes test (fasting glucose).  Shots: Get a flu shot each year. Get a tetanus shot every 10 years.   Nutrition:     Eat at least 5 servings of fruits and vegetables each day.    Eat whole-grain bread,  whole-wheat pasta and brown rice instead of white grains and rice.    Get adequate Calcium and Vitamin D.     Lifestyle    Exercise at least 150 minutes a week (30 minutes a day, 5 days of the week). This will help you control your weight and prevent disease.    Limit alcohol to one drink per day.    No smoking.     Wear sunscreen to prevent skin cancer.    See your dentist every six months for an exam and cleaning.

## 2019-04-13 ASSESSMENT — ANXIETY QUESTIONNAIRES: GAD7 TOTAL SCORE: 1

## 2019-05-21 ENCOUNTER — TELEPHONE (OUTPATIENT)
Dept: PEDIATRICS | Facility: CLINIC | Age: 34
End: 2019-05-21

## 2019-05-21 NOTE — TELEPHONE ENCOUNTER
1st attempt-called and left VM for patient to call back, forms were not signed by patient. Patient will need to come in to sign forms before provider can complete.      [facility] CLINICAL DOCUMENTATION    Form Documentation Form or Letter Request    Type or form/letter needing completion: Biometric forms  Provider: LAINEY Krishnamurthy CNP  Has provider seen patient for office visit related to reason for form request? Yes  Date form needed: submitted before 11/30/19  Once completed: Mail form to Name: Darcie Means Jeannette Rivers, at Address: [address] [place], [place] [id number]

## 2019-05-24 NOTE — TELEPHONE ENCOUNTER
2nd attempt-called and left VM on patient 's mobile phone to have them call back to discuss forms that were not signed by patient.    ALETA LainezA

## 2019-06-04 NOTE — TELEPHONE ENCOUNTER
Reviewed incoming paperwork for provider. No Biometric Form for patient seen. Form not located on provider desk.    Will continue to monitor for form to be dropped off by patient/spouse.  Rochelle Jauregui CMA

## 2019-06-10 NOTE — TELEPHONE ENCOUNTER
Called and left VM for patient's spouse to call back. Have not received any signed forms.    JEFFREY Lainez

## 2019-06-13 NOTE — TELEPHONE ENCOUNTER
Have left multiple messages for patient's  and have not received any signed forms for biometric. Please advise as to what we should do?    JEFFREY Lainez

## 2019-06-18 ENCOUNTER — TELEPHONE (OUTPATIENT)
Dept: PEDIATRICS | Facility: CLINIC | Age: 34
End: 2019-06-18

## 2019-06-18 NOTE — TELEPHONE ENCOUNTER
[facility] CLINICAL DOCUMENTATION    Form Documentation Form or Letter Request    Type or form/letter needing completion: Biometric screening form  Provider: LAINEY Krishnamurthy CNP  Has provider seen patient for office visit related to reason for form request? Yes  Date form needed: by 11/30/19  Once completed: Mail form to Name: ryna Blake Address: [address] [place] [address] [place], [place] [address]     JEFFREY Lainez

## 2019-08-15 ENCOUNTER — ANCILLARY PROCEDURE (OUTPATIENT)
Dept: ULTRASOUND IMAGING | Facility: CLINIC | Age: 34
End: 2019-08-15
Attending: NURSE PRACTITIONER
Payer: COMMERCIAL

## 2019-08-15 DIAGNOSIS — R92.8 BI-RADS CATEGORY 3 MAMMOGRAM RESULT: ICD-10-CM

## 2019-08-15 PROCEDURE — 76642 ULTRASOUND BREAST LIMITED: CPT | Mod: LT

## 2020-02-14 ENCOUNTER — OFFICE VISIT (OUTPATIENT)
Dept: PEDIATRICS | Facility: CLINIC | Age: 35
End: 2020-02-14
Payer: COMMERCIAL

## 2020-02-14 VITALS
SYSTOLIC BLOOD PRESSURE: 124 MMHG | OXYGEN SATURATION: 96 % | HEIGHT: 63 IN | DIASTOLIC BLOOD PRESSURE: 86 MMHG | BODY MASS INDEX: 25.69 KG/M2 | HEART RATE: 91 BPM | TEMPERATURE: 98.1 F | WEIGHT: 145 LBS

## 2020-02-14 DIAGNOSIS — N39.3 FEMALE STRESS INCONTINENCE: ICD-10-CM

## 2020-02-14 DIAGNOSIS — M54.2 NECK PAIN: ICD-10-CM

## 2020-02-14 DIAGNOSIS — E78.5 HYPERLIPIDEMIA LDL GOAL <160: ICD-10-CM

## 2020-02-14 DIAGNOSIS — K52.9 CHRONIC DIARRHEA: ICD-10-CM

## 2020-02-14 DIAGNOSIS — Z13.1 SCREENING FOR DIABETES MELLITUS: ICD-10-CM

## 2020-02-14 DIAGNOSIS — Z00.00 ROUTINE GENERAL MEDICAL EXAMINATION AT A HEALTH CARE FACILITY: Primary | ICD-10-CM

## 2020-02-14 PROCEDURE — 99385 PREV VISIT NEW AGE 18-39: CPT | Performed by: INTERNAL MEDICINE

## 2020-02-14 PROCEDURE — 99214 OFFICE O/P EST MOD 30 MIN: CPT | Mod: 25 | Performed by: INTERNAL MEDICINE

## 2020-02-14 ASSESSMENT — MIFFLIN-ST. JEOR: SCORE: 1316.85

## 2020-02-14 NOTE — PATIENT INSTRUCTIONS
Make appointment(s) for:   -- neck US  -- colonoscopy  -- fasting labs in the near future              Preventive Health Recommendations  Female Ages 26 - 39  Yearly exam:   See your health care provider every year in order to    Review health changes.     Discuss preventive care.      Review your medicines if you your doctor has prescribed any.    Until age 30: Get a Pap test every three years (more often if you have had an abnormal result).    After age 30: Talk to your doctor about whether you should have a Pap test every 3 years or have a Pap test with HPV screening every 5 years.   You do not need a Pap test if your uterus was removed (hysterectomy) and you have not had cancer.  You should be tested each year for STDs (sexually transmitted diseases), if you're at risk.   Talk to your provider about how often to have your cholesterol checked.  If you are at risk for diabetes, you should have a diabetes test (fasting glucose).  Shots: Get a flu shot each year. Get a tetanus shot every 10 years.   Nutrition:     Eat at least 5 servings of fruits and vegetables each day.    Eat whole-grain bread, whole-wheat pasta and brown rice instead of white grains and rice.    Get adequate Calcium and Vitamin D.     Lifestyle    Exercise at least 150 minutes a week (30 minutes a day, 5 days of the week). This will help you control your weight and prevent disease.    Limit alcohol to one drink per day.    No smoking.     Wear sunscreen to prevent skin cancer.    See your dentist every six months for an exam and cleaning.    Patient Education   Kegel Exercises  What are Kegel exercises?  Kegels are exercises that tighten and release the pelvic muscles. These muscles wrap around both the anus and urethra (the tube that carries urine out of the body).  To find these muscles, try to stop and start the flow of urine while using the toilet.  Kegel exercises may:    Give you greater bladder control (stop or prevent urine from  leaking).    Give you greater bowel control.    Increase pleasure during sex.    Ease childbirth for pregnant women.    Help men regain bladder control after prostate cancer treatment.  How can I test my muscle strength?  As you urinate (pass water), try to stop your stream of urine: Tighten the muscle around your urethra. If you can stop the stream, then you have good muscle control.  To maintain good control, you need to exercise these muscles regularly.  If you cannot stop your stream, Kegels can help you improve your muscle control.  How do I do Kegel exercises?  1. Squeeze and lift the muscles around the urethra. (You should feel the muscles lift near the urethra or tighten in your rectum.)  2. Hold them tight as you count to 5 or 10.  3. Then, slowly relax these muscles as you count to 5 or 10.  4. Repeat five times.  Do these exercises three times a day: Five times in the morning, five times in the afternoon and five times at night.  Where to exercise  You may do the exercises anywhere and anytime. For instance:    At red traffic lights.    During TV commercials.    During coffee breaks.    While waiting for the bus.    At the grocery store.    When brushing your teeth.    During sex (for women).  Common mistakes  Don't use the muscles in your stomach, legs or buttocks. Your leg and buttock muscles should not move during these exercises.  To check your stomach muscles, put your hand on your stomach when you do your Kegels. If you feel your stomach move, then you are using the wrong muscles.  Can these exercises hurt me?  No, these exercises cannot harm you in any way. You should find them relaxing and easy.  Back or stomach pain may mean you are using your stomach muscles.  If you get headaches and your chest muscles are tense, you are likely holding your breath. Try to breathe normally during your Kegels.  When will I notice a change?  If you have weak bladder control, you will notice a change after four to  six weeks of daily exercise. After three months, you will see an even bigger difference.  Make these exercises a habit: Tighten the muscles when you walk, before you cough, as you stand up and on the way to the bathroom.  For informational purposes only. Not to replace the advice of your health care provider. Copyright   1981, 2009 [facility]. All rights reserved. [employer] [id number] - REV 06/16.

## 2020-02-14 NOTE — PROGRESS NOTES
SUBJECTIVE:   CC: Lou Rivers is an 34 year old woman who presents for preventive health visit.     Healthy Habits:    Do you get at least three servings of calcium containing foods daily (dairy, green leafy vegetables, etc.)? no    Amount of exercise or daily activities, outside of work: 0 day(s) per week    Problems taking medications regularly No    Medication side effects: No    Have you had an eye exam in the past two years? yes    Do you see a dentist twice per year? no    Do you have sleep apnea, excessive snoring or daytime drowsiness?yes    HPI:  Patient is new to this provider.  1. Left submandibular pain for 5 years, comes and goes. Feels like some one pinching her. Left nose surgery more than 15 years ago for deviated septum.   2. Diarrhea: has it a lot for a few years. She was evaluated 3 years ago. Negative stool testing for infection. There was plan for a colonoscopy but she was pregnant. Diarrhea comes and goes. She was diagnosed strep throat on 2/1/2020. She was treated cephalexin. Normally has BM 2 times a day, today it is 4 times. Today the BM is oily and sometimes it is watery. Reported her diarrhea is not related with antibiotic or specific food type such as dairy.   3. After given birth in 2018, has uncontrolled urination. Coughing/sneezing, will leak urine.   4. After birth, has pain sometimes of left hand and the back. All her body feel weak.       Today's PHQ-2 Score:   PHQ-2 ( 1999 Pf[employer]) 2/13/2020 2/13/2020   Q1: Little interest or pleasure in doing things 0 0   Q2: Feeling down, depressed or hopeless 0 0   PHQ-2 Score 0 0   Q1: Little interest or pleasure in doing things Not at all -   Q2: Feeling down, depressed or hopeless Not at all -   PHQ-2 Score 0 -       Abuse: Current or Past(Physical, Sexual or Emotional)- No  Do you feel safe in your environment? Yes        Social History     Tobacco Use     Smoking status: Never Smoker     Smokeless tobacco: Never Used   Substance Use  "Topics     Alcohol use: No     If you drink alcohol do you typically have >3 drinks per day or >7 drinks per week? No                     Reviewed orders with patient.  Reviewed health maintenance and updated orders accordingly - Yes  Labs reviewed in EPIC    Mammogram not appropriate for this patient based on age.    Pertinent mammograms are reviewed under the imaging tab.  History of abnormal Pap smear: NO - age 30-65 PAP every 5 years with negative HPV co-testing recommended  PAP / HPV Latest Ref Rng & Units 4/27/2017   PAP - NIL   HPV 16 DNA NEG Negative   HPV 18 DNA NEG Negative   OTHER HR HPV NEG Negative     Reviewed and updated as needed this visit by clinical staff  Tobacco  Allergies  Meds  Med Hx  Surg Hx  Fam Hx  Soc Hx        Reviewed and updated as needed this visit by Provider            ROS:  Constitutional, HEENT, cardiovascular, pulmonary, gi and gu systems are negative, except as otherwise noted.     OBJECTIVE:   /86   Pulse 91   Temp 98.1  F (36.7  C) (Temporal)   Ht 1.6 m (5' 3\")   Wt 65.8 kg (145 lb)   SpO2 96%   BMI 25.69 kg/m    EXAM:  GENERAL: healthy, alert and no distress  EYES: Eyes grossly normal to inspection, PERRL and conjunctivae and sclerae normal  HENT: ear canals and TM's normal, nose and mouth without ulcers or lesions  NECK: no adenopathy, no asymmetry, masses, or scars and thyroid normal to palpation  RESP: lungs clear to auscultation - no rales, rhonchi or wheezes  BREAST: normal without masses, tenderness or nipple discharge and no palpable axillary masses or adenopathy  CV: regular rate and rhythm, normal S1 S2, no S3 or S4, no murmur, click or rub, no peripheral edema and peripheral pulses strong  ABDOMEN: soft, nontender, no hepatosplenomegaly, no masses and bowel sounds normal  MS: no gross musculoskeletal defects noted, no edema  SKIN: no suspicious lesions or rashes  NEURO: Normal strength and tone, mentation intact and speech normal  PSYCH: mentation " "appears normal, affect normal/bright    Diagnostic Test Results:      ASSESSMENT/PLAN:       ICD-10-CM    1. Routine general medical examination at a health care facility Z00.00    2. Hyperlipidemia LDL goal <160 E78.5 Lipid panel reflex to direct LDL Fasting   3. Screening for diabetes mellitus Z13.1 Glucose   4. Neck pain M54.2 US Head Neck Soft Tissue     CBC with platelets and differential   5. Female stress incontinence N39.3    6. Chronic diarrhea K52.9 GASTROENTEROLOGY ADULT REF PROCEDURE ONLY [facility] [phone]; No Provider Preference     Patient with multiple complaints of chronic issues.   1. Chronic diarrhea: will get colonoscopy. If negative, will treat as IBS and have her start with nutritional counseling.   2. Left submandibular pain: I didn't palpate any mass. With its chronicity, will obtain US and check CBC/diff  3. Stress incontinence: start with Kegel exercise. If no improvement, refer to urology.   4. Body aches, hand pain: none is present on exam today. No evidence of synovitis on the hands. Will have pt follow up with PCP when she has ongoing pain.     COUNSELING:   Reviewed preventive health counseling, as reflected in patient instructions    Estimated body mass index is 25.69 kg/m  as calculated from the following:    Height as of this encounter: 1.6 m (5' 3\").    Weight as of this encounter: 65.8 kg (145 lb).         reports that she has never smoked. She has never used smokeless tobacco.      Counseling Resources:  ATP IV Guidelines  Pooled Cohorts Equation Calculator  Breast Cancer Risk Calculator  FRAX Risk Assessment  ICSI Preventive Guidelines  Dietary Guidelines for Americans, 2010  USDA's MyPlate  ASA Prophylaxis  Lung CA Screening    Saud Elizabeth MD PhD  [facility]  "

## 2020-03-03 ENCOUNTER — HOSPITAL ENCOUNTER (OUTPATIENT)
Facility: AMBULATORY SURGERY CENTER | Age: 35
End: 2020-03-03
Attending: INTERNAL MEDICINE
Payer: COMMERCIAL

## 2020-03-14 DIAGNOSIS — M54.2 NECK PAIN: ICD-10-CM

## 2020-03-14 DIAGNOSIS — Z13.1 SCREENING FOR DIABETES MELLITUS: ICD-10-CM

## 2020-03-14 DIAGNOSIS — E78.5 HYPERLIPIDEMIA LDL GOAL <160: ICD-10-CM

## 2020-03-14 LAB
BASOPHILS # BLD AUTO: 0.1 10E9/L (ref 0–0.2)
BASOPHILS NFR BLD AUTO: 0.8 %
CHOLEST SERPL-MCNC: 217 MG/DL
DIFFERENTIAL METHOD BLD: NORMAL
EOSINOPHIL # BLD AUTO: 0.2 10E9/L (ref 0–0.7)
EOSINOPHIL NFR BLD AUTO: 2.6 %
ERYTHROCYTE [DISTWIDTH] IN BLOOD BY AUTOMATED COUNT: 12.2 % (ref 10–15)
GLUCOSE SERPL-MCNC: 99 MG/DL (ref 70–99)
HCT VFR BLD AUTO: 42.7 % (ref 35–47)
HDLC SERPL-MCNC: 56 MG/DL
HGB BLD-MCNC: 14.3 G/DL (ref 11.7–15.7)
IMM GRANULOCYTES # BLD: 0 10E9/L (ref 0–0.4)
IMM GRANULOCYTES NFR BLD: 0.2 %
LDLC SERPL CALC-MCNC: 137 MG/DL
LYMPHOCYTES # BLD AUTO: 2.4 10E9/L (ref 0.8–5.3)
LYMPHOCYTES NFR BLD AUTO: 36.4 %
MCH RBC QN AUTO: 29.9 PG (ref 26.5–33)
MCHC RBC AUTO-ENTMCNC: 33.5 G/DL (ref 31.5–36.5)
MCV RBC AUTO: 89 FL (ref 78–100)
MONOCYTES # BLD AUTO: 0.4 10E9/L (ref 0–1.3)
MONOCYTES NFR BLD AUTO: 6 %
NEUTROPHILS # BLD AUTO: 3.5 10E9/L (ref 1.6–8.3)
NEUTROPHILS NFR BLD AUTO: 54 %
NONHDLC SERPL-MCNC: 161 MG/DL
PLATELET # BLD AUTO: 250 10E9/L (ref 150–450)
RBC # BLD AUTO: 4.79 10E12/L (ref 3.8–5.2)
TRIGL SERPL-MCNC: 118 MG/DL
WBC # BLD AUTO: 6.5 10E9/L (ref 4–11)

## 2020-03-14 PROCEDURE — 80061 LIPID PANEL: CPT | Performed by: INTERNAL MEDICINE

## 2020-03-14 PROCEDURE — 82947 ASSAY GLUCOSE BLOOD QUANT: CPT | Performed by: INTERNAL MEDICINE

## 2020-03-14 PROCEDURE — 36415 COLL VENOUS BLD VENIPUNCTURE: CPT | Performed by: INTERNAL MEDICINE

## 2020-03-14 PROCEDURE — 85025 COMPLETE CBC W/AUTO DIFF WBC: CPT | Performed by: INTERNAL MEDICINE

## 2020-03-14 NOTE — RESULT ENCOUNTER NOTE
Dear Lou,   Your recent lab results showed the following:  -- Lipid panel is borderline elevated. Prescription medication is not needed at this time. Healthy eating with low fat low cholesterol diet, exercise 30 minutes 3-5 times a week will help improve cholesterol. We'll monitor this annually with your physical.      Please call or Mychart to our office if you have further questions.     Saud Elizabeth MD-PhD

## 2020-03-14 NOTE — RESULT ENCOUNTER NOTE
Dear Mythiendi,   Your recent test result are within acceptable range or at baseline. Please continue with your current plan of care.       Please call or Mychart to our office if you have further questions.     Saud Elizabeth MD-PhD

## 2020-03-17 ENCOUNTER — ANCILLARY PROCEDURE (OUTPATIENT)
Dept: ULTRASOUND IMAGING | Facility: CLINIC | Age: 35
End: 2020-03-17
Attending: INTERNAL MEDICINE
Payer: COMMERCIAL

## 2020-03-17 DIAGNOSIS — M54.2 NECK PAIN: ICD-10-CM

## 2020-03-17 PROCEDURE — 76536 US EXAM OF HEAD AND NECK: CPT | Performed by: RADIOLOGY

## 2020-04-03 ENCOUNTER — SURGERY (OUTPATIENT)
Age: 35
End: 2020-04-03
Payer: COMMERCIAL

## 2020-06-15 ENCOUNTER — MYC MEDICAL ADVICE (OUTPATIENT)
Dept: PEDIATRICS | Facility: CLINIC | Age: 35
End: 2020-06-15

## 2020-06-16 NOTE — TELEPHONE ENCOUNTER
Routing to covering provider to please review patient's symptoms and appt visit type.    Ann Murray RN, [facility]

## 2020-06-16 NOTE — PROGRESS NOTES
Peterson Rivers is a 35 year old female who presents to clinic today for the following health issues:    HPI   Breast Lump      Duration: month    Description (location/character/radiation): lump right breast, denies pain    Intensity:  mild    Accompanying signs and symptoms: none    History (similar episodes/previous evaluation): None    Precipitating or alleviating factors: None    Therapies tried and outcome: None     RESPIRATORY SYMPTOMS      Duration: 2 days    Description  sore throat and cough    Severity: mild    Accompanying signs and symptoms: itchy eyes and throat.    History (predisposing factors):  none    Precipitating or alleviating factors: None    Therapies tried and outcome:  none      Patient Active Problem List   Diagnosis     Allergic rhinitis     Adjustment disorder with mixed anxiety and depressed mood     Supervision of other normal pregnancy, antepartum      (normal spontaneous vaginal delivery)     Past Surgical History:   Procedure Laterality Date     LASIK BILATERAL Bilateral      SINUS SURGERY         Social History     Tobacco Use     Smoking status: Never Smoker     Smokeless tobacco: Never Used   Substance Use Topics     Alcohol use: No     Family History   Problem Relation Age of Onset     Coronary Artery Disease Mother      Hypertension Mother      Other Cancer Mother         throat cancer     Other - See Comments Mother         neck problems     Thyroid Disease Mother      Coronary Artery Disease Father      Hypertension Father      Hyperlipidemia Father      Other - See Comments Father         herniated disc in lumbar     Gout Father      Liver Cancer Maternal Grandmother      Myocardial Infarction Maternal Grandfather      Diabetes No family hx of      Cerebrovascular Disease No family hx of      Breast Cancer No family hx of      Colon Cancer No family hx of      Prostate Cancer No family hx of      Depression No family hx of      Anxiety Disorder No family hx  of      Mental Illness No family hx of      Substance Abuse No family hx of      Anesthesia Reaction No family hx of      Asthma No family hx of      Osteoporosis No family hx of      Genetic Disorder No family hx of      Obesity No family hx of      Unknown/Adopted No family hx of          Current Outpatient Medications   Medication Sig Dispense Refill     CALCIUM-MAGNESIUM-ZINC PO        etonogestrel (IMPLANON/NEXPLANON) 68 MG IMPL 1 each (68 mg) by Subdermal route continuous  0     Prenatal Vit-Fe Fumarate-FA (PRENATAL MULTIVITAMIN W/IRON) 27-0.8 MG tablet TAKE ONE TABLET BY MOUTH EVERY  tablet 3     ranitidine (ZANTAC) 150 MG tablet Take 1 tablet (150 mg) by mouth 2 times daily (Patient not taking: Reported on 2/14/2020) 180 tablet 3     Allergies   Allergen Reactions     Dust Mites      House dust       Reviewed and updated as needed this visit by Provider  Med Hx  Surg Hx  Fam Hx         Review of Systems   Constitutional, HEENT, cardiovascular, pulmonary, GI, , musculoskeletal, neuro, skin, endocrine and psych systems are negative, except as otherwise noted.      Objective    /86   Temp 97.1  F (36.2  C)   Resp 18   SpO2 97%   There is no height or weight on file to calculate BMI.  Physical Exam   GENERAL: healthy, alert and no distress  EYES: Eyes grossly normal to inspection, PERRL and conjunctivae and sclerae normal  HENT: ear canals and TM's normal, nose and mouth without ulcers or lesions  NECK: no adenopathy, no asymmetry, masses, or scars and thyroid normal to palpation  RESP: lungs clear to auscultation - no rales, rhonchi or wheezes  BREAST: normal without masses, tenderness or nipple discharge and no palpable axillary masses or adenopathy  CV: regular rate and rhythm, normal S1 S2, no S3 or S4, no murmur, click or rub, no peripheral edema and peripheral pulses strong  ABDOMEN: soft, nontender, no hepatosplenomegaly, no masses and bowel sounds normal  MS: no gross musculoskeletal  defects noted, no edema          Assessment & Plan     1. Lump or mass in breast  - US Breast Right Complete 4 Quadrants; Future  - MA Diagnostic Digital Bilateral; Future    2. Exposure to COVID-19 virus  Symptomatic therapy suggested: push fluids, rest and use acetaminophen, ibuprofen, antihistamine-decongestant of choice, Robitussin as needed.  - Symptomatic COVID-19 Virus (Coronavirus) by PCR; Future           No follow-ups on file.    Connor Marc MD  [facility]

## 2020-06-17 ENCOUNTER — OFFICE VISIT (OUTPATIENT)
Dept: PEDIATRICS | Facility: CLINIC | Age: 35
End: 2020-06-17
Payer: COMMERCIAL

## 2020-06-17 DIAGNOSIS — Z20.822 EXPOSURE TO COVID-19 VIRUS: ICD-10-CM

## 2020-06-17 DIAGNOSIS — N63.0 LUMP OR MASS IN BREAST: Primary | ICD-10-CM

## 2020-06-17 PROCEDURE — 99213 OFFICE O/P EST LOW 20 MIN: CPT | Performed by: FAMILY MEDICINE

## 2020-06-17 NOTE — LETTER
June 17, 2020      RE: Lou Rivers  [id number] [address] [facility] [address]        To whom it may concern:    Lou Rivers is under my professional care for    Exposure to COVID-19 virus, she would be unable to work 6/18/2020 to 6/20/2020  Sincerely,      Connor Marc MD

## 2020-06-18 ENCOUNTER — MYC MEDICAL ADVICE (OUTPATIENT)
Dept: PEDIATRICS | Facility: CLINIC | Age: 35
End: 2020-06-18

## 2020-06-18 DIAGNOSIS — Z20.822 EXPOSURE TO COVID-19 VIRUS: ICD-10-CM

## 2020-06-18 PROCEDURE — U0003 INFECTIOUS AGENT DETECTION BY NUCLEIC ACID (DNA OR RNA); SEVERE ACUTE RESPIRATORY SYNDROME CORONAVIRUS 2 (SARS-COV-2) (CORONAVIRUS DISEASE [COVID-19]), AMPLIFIED PROBE TECHNIQUE, MAKING USE OF HIGH THROUGHPUT TECHNOLOGIES AS DESCRIBED BY CMS-2020-01-R: HCPCS | Performed by: FAMILY MEDICINE

## 2020-06-19 VITALS
SYSTOLIC BLOOD PRESSURE: 124 MMHG | OXYGEN SATURATION: 97 % | TEMPERATURE: 97.1 F | RESPIRATION RATE: 18 BRPM | DIASTOLIC BLOOD PRESSURE: 86 MMHG

## 2020-06-19 LAB
SARS-COV-2 RNA SPEC QL NAA+PROBE: NOT DETECTED
SPECIMEN SOURCE: NORMAL

## 2020-06-25 ENCOUNTER — ANCILLARY PROCEDURE (OUTPATIENT)
Dept: ULTRASOUND IMAGING | Facility: CLINIC | Age: 35
End: 2020-06-25
Attending: FAMILY MEDICINE
Payer: COMMERCIAL

## 2020-06-25 ENCOUNTER — ANCILLARY PROCEDURE (OUTPATIENT)
Dept: MAMMOGRAPHY | Facility: CLINIC | Age: 35
End: 2020-06-25
Attending: FAMILY MEDICINE
Payer: COMMERCIAL

## 2020-06-25 DIAGNOSIS — N63.0 LUMP OR MASS IN BREAST: ICD-10-CM

## 2020-06-25 PROCEDURE — 76642 ULTRASOUND BREAST LIMITED: CPT | Mod: RT

## 2020-06-25 PROCEDURE — 77066 DX MAMMO INCL CAD BI: CPT

## 2020-06-25 PROCEDURE — G0279 TOMOSYNTHESIS, MAMMO: HCPCS

## 2020-07-29 DIAGNOSIS — Z11.59 ENCOUNTER FOR SCREENING FOR OTHER VIRAL DISEASES: Primary | ICD-10-CM

## 2020-08-18 ENCOUNTER — MYC MEDICAL ADVICE (OUTPATIENT)
Dept: PEDIATRICS | Facility: CLINIC | Age: 35
End: 2020-08-18

## 2020-08-28 RX ORDER — SODIUM, POTASSIUM,MAG SULFATES 17.5-3.13G
1 SOLUTION, RECONSTITUTED, ORAL ORAL SEE ADMIN INSTRUCTIONS
Qty: 2 BOTTLE | Refills: 0 | Status: SHIPPED | OUTPATIENT
Start: 2020-08-28

## 2020-08-28 RX ORDER — BISACODYL 5 MG/1
15 TABLET, DELAYED RELEASE ORAL SEE ADMIN INSTRUCTIONS
Qty: 3 TABLET | Refills: 0 | Status: SHIPPED | OUTPATIENT
Start: 2020-08-28

## 2020-09-01 DIAGNOSIS — Z11.59 ENCOUNTER FOR SCREENING FOR OTHER VIRAL DISEASES: ICD-10-CM

## 2020-09-01 PROCEDURE — U0003 INFECTIOUS AGENT DETECTION BY NUCLEIC ACID (DNA OR RNA); SEVERE ACUTE RESPIRATORY SYNDROME CORONAVIRUS 2 (SARS-COV-2) (CORONAVIRUS DISEASE [COVID-19]), AMPLIFIED PROBE TECHNIQUE, MAKING USE OF HIGH THROUGHPUT TECHNOLOGIES AS DESCRIBED BY CMS-2020-01-R: HCPCS | Performed by: INTERNAL MEDICINE

## 2020-09-02 LAB
SARS-COV-2 RNA SPEC QL NAA+PROBE: NOT DETECTED
SPECIMEN SOURCE: NORMAL

## 2020-09-04 ENCOUNTER — SURGERY (OUTPATIENT)
Age: 35
End: 2020-09-04
Payer: COMMERCIAL

## 2020-09-04 ENCOUNTER — HOSPITAL ENCOUNTER (OUTPATIENT)
Facility: AMBULATORY SURGERY CENTER | Age: 35
Discharge: HOME | End: 2020-09-04
Attending: INTERNAL MEDICINE | Admitting: INTERNAL MEDICINE
Payer: COMMERCIAL

## 2020-09-04 VITALS
TEMPERATURE: 98 F | HEART RATE: 80 BPM | RESPIRATION RATE: 16 BRPM | OXYGEN SATURATION: 99 % | DIASTOLIC BLOOD PRESSURE: 81 MMHG | SYSTOLIC BLOOD PRESSURE: 140 MMHG

## 2020-09-04 DIAGNOSIS — Z12.11 SCREEN FOR COLON CANCER: Primary | ICD-10-CM

## 2020-09-04 PROCEDURE — G8918 PT W/O PREOP ORDER IV AB PRO: HCPCS

## 2020-09-04 PROCEDURE — 88305 TISSUE EXAM BY PATHOLOGIST: CPT | Performed by: INTERNAL MEDICINE

## 2020-09-04 PROCEDURE — G8907 PT DOC NO EVENTS ON DISCHARG: HCPCS

## 2020-09-04 PROCEDURE — 45378 DIAGNOSTIC COLONOSCOPY: CPT | Performed by: INTERNAL MEDICINE

## 2020-09-04 PROCEDURE — 45380 COLONOSCOPY AND BIOPSY: CPT

## 2020-09-04 RX ORDER — FENTANYL CITRATE 50 UG/ML
INJECTION, SOLUTION INTRAMUSCULAR; INTRAVENOUS PRN
Status: DISCONTINUED | OUTPATIENT
Start: 2020-09-04 | End: 2020-09-04 | Stop reason: HOSPADM

## 2020-09-04 RX ORDER — ONDANSETRON 2 MG/ML
4 INJECTION INTRAMUSCULAR; INTRAVENOUS
Status: DISCONTINUED | OUTPATIENT
Start: 2020-09-04 | End: 2020-09-05 | Stop reason: HOSPADM

## 2020-09-04 RX ORDER — NALOXONE HYDROCHLORIDE 0.4 MG/ML
.1-.4 INJECTION, SOLUTION INTRAMUSCULAR; INTRAVENOUS; SUBCUTANEOUS
Status: DISCONTINUED | OUTPATIENT
Start: 2020-09-04 | End: 2020-09-05 | Stop reason: HOSPADM

## 2020-09-04 RX ORDER — ONDANSETRON 4 MG/1
4 TABLET, ORALLY DISINTEGRATING ORAL EVERY 6 HOURS PRN
Status: DISCONTINUED | OUTPATIENT
Start: 2020-09-04 | End: 2020-09-05 | Stop reason: HOSPADM

## 2020-09-04 RX ORDER — LIDOCAINE 40 MG/G
CREAM TOPICAL
Status: DISCONTINUED | OUTPATIENT
Start: 2020-09-04 | End: 2020-09-05 | Stop reason: HOSPADM

## 2020-09-04 RX ORDER — ONDANSETRON 2 MG/ML
4 INJECTION INTRAMUSCULAR; INTRAVENOUS EVERY 6 HOURS PRN
Status: DISCONTINUED | OUTPATIENT
Start: 2020-09-04 | End: 2020-09-05 | Stop reason: HOSPADM

## 2020-09-04 RX ORDER — FLUMAZENIL 0.1 MG/ML
0.2 INJECTION, SOLUTION INTRAVENOUS
Status: DISCONTINUED | OUTPATIENT
Start: 2020-09-04 | End: 2020-09-05 | Stop reason: HOSPADM

## 2020-09-04 RX ADMIN — FENTANYL CITRATE 50 MCG: 50 INJECTION, SOLUTION INTRAMUSCULAR; INTRAVENOUS at 11:38

## 2020-09-04 RX ADMIN — FENTANYL CITRATE 25 MCG: 50 INJECTION, SOLUTION INTRAMUSCULAR; INTRAVENOUS at 11:41

## 2020-09-04 RX ADMIN — FENTANYL CITRATE 25 MCG: 50 INJECTION, SOLUTION INTRAMUSCULAR; INTRAVENOUS at 11:46

## 2020-09-09 LAB — COPATH REPORT: NORMAL

## 2020-09-14 LAB — COLONOSCOPY: NORMAL

## 2020-10-05 ENCOUNTER — VIRTUAL VISIT (OUTPATIENT)
Dept: FAMILY MEDICINE | Facility: OTHER | Age: 35
End: 2020-10-05

## 2020-10-05 ENCOUNTER — MYC MEDICAL ADVICE (OUTPATIENT)
Dept: PEDIATRICS | Facility: CLINIC | Age: 35
End: 2020-10-05

## 2020-10-05 NOTE — PROGRESS NOTES
"Date: 10/05/2020 18:16:09  Clinician: Agusto Sloan  Clinician NPI: [id number]  Patient: Darcie Machado Rivers  Patient : 1985  Patient Address: [address] [place], [place] [address] [address]  Patient Phone: [phone]  Visit Protocol: URI  Patient Summary:  Darcie Machado is a 35 year old ( : 1985 ) female who initiated a OnCare Visit for COVID-19 (Coronavirus) evaluation and screening. When asked the question \"Please sign me up to receive news, health information and promotions. \", Darcie Machado responded \"No\".    When asked when her symptoms started, Darcie Macahdo reported that she does not have any symptoms.   She denies taking antibiotic medication in the past month and having recent facial or sinus surgery in the past 60 days.    Pertinent COVID-19 (Coronavirus) information  In the past 14 days, Darcie Machado has not worked in a congregate living setting.   She does not work or volunteer as healthcare worker or a  and does not work or volunteer in a healthcare facility.   Darcie Machado also has not lived in a congregate living setting in the past 14 days. She does not live with a healthcare worker.   Darcie Machado has not had a close contact with a laboratory-confirmed COVID-19 patient within the last 14 days.   Since 2019, Darcie Machado and has not had upper respiratory infection or influenza-like illness. Has not been diagnosed with lab-confirmed COVID-19 test   Pertinent medical history  Darcie Machado does not get yeast infections when she takes antibiotics.   Darcie Machado does not need a return to work/school note.   Weight: 150 lbs   Darcie Machado does not smoke or use smokeless tobacco.   She denies pregnancy and is breastfeeding. Her last period was over a month ago.   Weight: 150 lbs  A synchronous phone visit was initiated by the provider for the following reason: reason?    MEDICATIONS: No current medications, ALLERGIES: NKDA  Clinician Response:  Dear Darcie Machado,  I am sorry you are not " feeling well. Additional information was needed to clarify some of the details provided during your OnCare Visit. Because I was unable to reach you, I would like you to be seen in person to further discuss your health history and symptoms so you get the most appropriate care for you.  You will not be charged for this OnCare Visit. Thank you for trusting us with your care.  COVID-19 (Coronavirus) General Information  Because there is currently no vaccine to prevent infection, the best way to protect yourself is to avoid being exposed to this virus. Common symptoms of COVID-19 include but are not limited to fever, cough, and shortness of breath. These symptoms appear 2-14 days after you are exposed to the virus that causes COVID-19. Click here for more information from the CDC on how to protect yourself.  If you are sick with COVID-19 or suspect you are infected with the virus that causes COVID-19, follow the steps here from the CDC to help prevent the disease from spreading to people in your home and community.  Click here for general information from the CDC on testing.  If you develop any of these emergency warning signs for COVID-19, get medical attention immediately:     Trouble breathing    Persistent pain or pressure in the chest    New confusion or inability to arouse    Bluish lips or face      Call your doctor or clinic before going in. Call 911 if you have a medical emergency and notify the  you have or think you may have COVID-19.  For more detailed and up to date information on COVID-19 (Coronavirus), please visit the CDC website.   Diagnosis: Unable to contact patient  Diagnosis ICD: R69  Synchronous Triage: phone, status: incomplete, duration: 120 seconds  Addendum created: October 05 18:48:02, 2020 created by: Agusto Sloan PA-C body: We have tried contacting you 3 times to help determine what you would like on for your oncare visit.  I would advise doing another oncare visit and writing in the  comment section what you are looking to have us help you with.  Thank you

## 2020-10-06 NOTE — TELEPHONE ENCOUNTER
I spoke with Lou to try and schedule a virtual visit with Dr Elizabeth, but patient declined stating that she ended up having a Covid test done a few hours ago at the [facility]. Patient said that she didn't have any further questions at this time.     Reid Vail  Procedure , [place]  Peds Specialty and Adult Endocrinology

## 2021-04-24 ENCOUNTER — HEALTH MAINTENANCE LETTER (OUTPATIENT)
Age: 36
End: 2021-04-24

## 2021-10-09 ENCOUNTER — HEALTH MAINTENANCE LETTER (OUTPATIENT)
Age: 36
End: 2021-10-09

## 2022-05-21 ENCOUNTER — HEALTH MAINTENANCE LETTER (OUTPATIENT)
Age: 37
End: 2022-05-21

## 2022-09-11 ENCOUNTER — HEALTH MAINTENANCE LETTER (OUTPATIENT)
Age: 37
End: 2022-09-11

## 2023-06-03 ENCOUNTER — HEALTH MAINTENANCE LETTER (OUTPATIENT)
Age: 38
End: 2023-06-03

## 2024-01-01 NOTE — PROGRESS NOTES
She reports feeling reassuring daily fetal activity and will continue to record.  She denies any fluid leakage or regular uterine contractions.  Her NST was reactive today and she appeared to have an occasional uterine contraction but no regular pattern was noted.  I advised a cervical exam but she requests an epidural prior to this, which is not possible.  Her BPP scored 8/8 on 6/27/18.  Due to her prolonged pregnancy status, will schedule her for cervical ripening on July 3rd at 6 pm and informed consent was reviewed and obtained.  She is requesting nitrous oxide for this cervical exam and med placement since an epidural is not possible for this indication.  She denies any hx of rape/incest but just mentally finds it difficult to relax sufficiently to separate her legs.  She was provided an ACOG pamphlet on labor induction and I [place] [facility] [place] but her Milligan score is unknown.  I offered Sunday evening for cx ripening but she declined since she wants her baby to be born on July 4th, if possible.  
[Negative] : Genitourinary

## 2024-02-24 ENCOUNTER — HEALTH MAINTENANCE LETTER (OUTPATIENT)
Age: 39
End: 2024-02-24

## 2025-06-09 NOTE — PATIENT INSTRUCTIONS
If you have any questions regarding your visit, Please contact your care team.    Women s Health CLINIC HOURS TELEPHONE NUMBER   Wen Laurent DO.    AVELINA Hoover -    KENNETH Mahoney       Monday, Wednesday, Thursday and Friday, [place]  8:30a.m-5:00 p.m   [facility]  [address] [place] [address]  [phone] ask for [facility]    Imaging [phone]       Urgent Care locations:    [facility] Saturday and Sunday   9 am - 5 pm    Monday-Friday   12 pm - 8 pm  Saturday and Sunday   9 am - 5 pm   [phone]     [facility] Labor and Delivery:  [phone]    If you need a medication refill, please contact your pharmacy. Please allow 3 business days for your refill to be completed.  As always, Thank you for trusting us with your healthcare needs!         Dc to address.    [place] BOX RN  [facility] Triage

## (undated) DEVICE — KIT ENDO FIRST STEP DISINFECTANT 200ML W/POUCH EP-4

## (undated) DEVICE — SOL WATER IRRIG 1000ML BOTTLE 07139-09

## (undated) DEVICE — PREP CHLORAPREP 26ML TINTED ORANGE  260815

## (undated) DEVICE — PAD CHUX UNDERPAD 23X24" 7136

## (undated) RX ORDER — FENTANYL CITRATE 50 UG/ML
INJECTION, SOLUTION INTRAMUSCULAR; INTRAVENOUS
Status: DISPENSED
Start: 2020-09-04